# Patient Record
Sex: FEMALE | Race: WHITE | NOT HISPANIC OR LATINO | Employment: UNEMPLOYED | URBAN - METROPOLITAN AREA
[De-identification: names, ages, dates, MRNs, and addresses within clinical notes are randomized per-mention and may not be internally consistent; named-entity substitution may affect disease eponyms.]

---

## 2017-02-17 ENCOUNTER — OFFICE VISIT (OUTPATIENT)
Dept: PSYCHIATRY | Facility: CLINIC | Age: 32
End: 2017-02-17
Payer: MEDICAID

## 2017-02-17 VITALS
SYSTOLIC BLOOD PRESSURE: 131 MMHG | WEIGHT: 138 LBS | HEART RATE: 94 BPM | HEIGHT: 66 IN | DIASTOLIC BLOOD PRESSURE: 71 MMHG | BODY MASS INDEX: 22.18 KG/M2

## 2017-02-17 DIAGNOSIS — F90.2 ADHD (ATTENTION DEFICIT HYPERACTIVITY DISORDER), COMBINED TYPE: ICD-10-CM

## 2017-02-17 DIAGNOSIS — F41.9 ANXIETY: ICD-10-CM

## 2017-02-17 PROCEDURE — 99213 OFFICE O/P EST LOW 20 MIN: CPT | Mod: AF,S$PBB,,

## 2017-02-17 PROCEDURE — 90833 PSYTX W PT W E/M 30 MIN: CPT | Mod: PBBFAC

## 2017-02-17 PROCEDURE — 99213 OFFICE O/P EST LOW 20 MIN: CPT | Mod: PBBFAC

## 2017-02-17 PROCEDURE — 99999 PR PBB SHADOW E&M-EST. PATIENT-LVL III: CPT | Mod: PBBFAC,,,

## 2017-02-17 RX ORDER — CLONAZEPAM 1 MG/1
1 TABLET ORAL 2 TIMES DAILY PRN
Qty: 60 TABLET | Refills: 2 | Status: SHIPPED | OUTPATIENT
Start: 2017-02-17 | End: 2017-02-17 | Stop reason: SDUPTHER

## 2017-02-17 RX ORDER — VENLAFAXINE HYDROCHLORIDE 75 MG/1
75 CAPSULE, EXTENDED RELEASE ORAL DAILY
Qty: 30 CAPSULE | Refills: 11 | Status: SHIPPED | OUTPATIENT
Start: 2017-02-17 | End: 2017-02-17 | Stop reason: SDUPTHER

## 2017-02-17 RX ORDER — DEXTROAMPHETAMINE SACCHARATE, AMPHETAMINE ASPARTATE, DEXTROAMPHETAMINE SULFATE AND AMPHETAMINE SULFATE 5; 5; 5; 5 MG/1; MG/1; MG/1; MG/1
1 TABLET ORAL DAILY
Qty: 30 TABLET | Refills: 0 | Status: SHIPPED | OUTPATIENT
Start: 2017-02-17 | End: 2017-03-15 | Stop reason: SDUPTHER

## 2017-02-17 RX ORDER — DEXTROAMPHETAMINE SACCHARATE, AMPHETAMINE ASPARTATE, DEXTROAMPHETAMINE SULFATE AND AMPHETAMINE SULFATE 7.5; 7.5; 7.5; 7.5 MG/1; MG/1; MG/1; MG/1
30 TABLET ORAL 2 TIMES DAILY
Qty: 60 TABLET | Refills: 0 | Status: SHIPPED | OUTPATIENT
Start: 2017-02-17 | End: 2017-03-15 | Stop reason: SDUPTHER

## 2017-02-17 RX ORDER — CLONAZEPAM 1 MG/1
1 TABLET ORAL 2 TIMES DAILY PRN
Qty: 60 TABLET | Refills: 2 | Status: SHIPPED | OUTPATIENT
Start: 2017-02-17 | End: 2017-03-15 | Stop reason: SDUPTHER

## 2017-02-17 RX ORDER — VENLAFAXINE HYDROCHLORIDE 75 MG/1
75 CAPSULE, EXTENDED RELEASE ORAL DAILY
Qty: 30 CAPSULE | Refills: 11 | Status: SHIPPED | OUTPATIENT
Start: 2017-02-17 | End: 2017-04-25

## 2017-02-23 NOTE — PROGRESS NOTES
"Outpatient Psychiatry Follow-Up Visit (MD/NP)    2/17/2017    Clinical Status of Patient:  Outpatient (Ambulatory)    Chief Complaint:  Terri Summers is a 31 y.o. female who presents today for follow-up of anxiety and attention problems.  Met with patient.      Interval History and Content of Current Session:  Interim Events/Subjective Report/Content of Current Session: Patient was hospitalized at [facility] from 12/24-12/19 after becoming intoxicated and allegedly verbalizing suicidal ideation to her mother, who called the police. This was her first psychiatric hospitalization. Patient states that she had a blackout that night so she does not remember what happened but her mother told her she said something akin to "I'm done" and mother interpreted that as a suicidal ideation; patient denies that she had suicidal ideation or a plan to hurt herself. Patient's medications were not changed at the hospital other than the addition of depakote; patient did not know why she was prescribed depakote, I discussed possible reasoning including her psychiatrist at [place] making a diagnosis of bipolar disorder (patient denies this) or seizure prophylaxis due to alcohol withdrawal (patient denies any history of alcohol dependence). I was not notified of hospitalization and have not received any records from [facility]. Patient had been off of effexor for four days at the time of her hospitalization, has been compliant daily since discharge and reports her mood has been much better, denies that she has any current suicidal ideation.     Patient and boyfriend broke up prior to hospitalization and patient is currently living with her mother, which is a significant stressor. Hopes to move out with her daughters before the end of the year.     Review of Systems   · PSYCHIATRIC: Pertinant items are noted in the narrative.  · CONSTITUTIONAL: No weight gain or loss.     Past Medical, Family and Social History: The " "patient's past medical, family and social history have been reviewed and updated as appropriate within the electronic medical record - see encounter notes.    Compliance: yes    Side effects: None    Risk Parameters:  Patient reports no suicidal ideation  Patient reports no homicidal ideation  Patient reports no self-injurious behavior  Patient reports no violent behavior    Exam (detailed: at least 9 elements; comprehensive: all 15 elements)   Constitutional  Vitals:  Most recent vital signs, dated less than 90 days prior to this appointment, were reviewed.   Vitals:    02/17/17 1630   BP: 131/71   Pulse: 94   Weight: 62.6 kg (138 lb)   Height: 5' 6" (1.676 m)        General:  unremarkable, age appropriate, normal weight, casually dressed, neatly groomed     Musculoskeletal  Muscle Strength/Tone:  not examined   Gait & Station:  non-ataxic     Psychiatric  Speech:  no latency; no press   Mood & Affect:  steady  congruent and appropriate   Thought Process:  normal and logical   Associations:  intact   Thought Content:  normal, no suicidality, no homicidality, delusions, or paranoia   Insight:  intact   Judgement: behavior is adequate to circumstances   Orientation:  grossly intact   Memory: intact for content of interview   Language: grossly intact   Attention Span & Concentration:  able to focus   Fund of Knowledge:  intact and appropriate to age and level of education, not tested     Assessment and Diagnosis   Status/Progress: Based on the examination today, the patient's problem(s) is/are worsening.  New problems have been presented today.   Co-morbidities, Diagnostic uncertainty and Lack of compliance are not complicating management of the primary condition.  There are no active rule-out diagnoses for this patient at this time.     General Impression: 31-year-old woman initially presented for anxiety, depression, and ADHD, depression and anxiety were improving on effexor until patient stopped her medications for " four days in 12/16 and was hospitalized, patient has been compliant with all her medications since then and reports her symptoms have improved, ADHD symptoms also well-controlled on medication.       ICD-10-CM ICD-9-CM   1. Anxiety F41.9 300.00   2. ADHD (attention deficit hyperactivity disorder), combined type F90.2 314.01       Intervention/Counseling/Treatment Plan   · Continue effexor XR 75mg daily for anxiety. Patient aware of risks, benefits, and alternatives  · Continue klonopin 1mg twice daily as needed for anxiety. Patient aware of risks, benefits, and alternatives including risk of abuse and dependence  · Continue adderall 30mg twice daily and 20mg daily. Patient aware of risks, benefits, and alternatives including risk of abuse and dependence  · Strongly encouraged patient to re-start psychothearpy    Return to Clinic: 1 month  Time spent face-to-face with patient: 20 minutes, >50% spent in counseling

## 2017-03-01 ENCOUNTER — OFFICE VISIT (OUTPATIENT)
Dept: OBSTETRICS AND GYNECOLOGY | Facility: CLINIC | Age: 32
End: 2017-03-01
Payer: MEDICAID

## 2017-03-01 VITALS
DIASTOLIC BLOOD PRESSURE: 70 MMHG | HEIGHT: 66 IN | SYSTOLIC BLOOD PRESSURE: 110 MMHG | BODY MASS INDEX: 22.68 KG/M2 | WEIGHT: 141.13 LBS

## 2017-03-01 DIAGNOSIS — Z11.3 SCREENING FOR STD (SEXUALLY TRANSMITTED DISEASE): ICD-10-CM

## 2017-03-01 DIAGNOSIS — N94.10 DYSPAREUNIA, FEMALE: ICD-10-CM

## 2017-03-01 DIAGNOSIS — Z01.419 WELL WOMAN EXAM WITH ROUTINE GYNECOLOGICAL EXAM: Primary | ICD-10-CM

## 2017-03-01 PROCEDURE — 99212 OFFICE O/P EST SF 10 MIN: CPT | Mod: PBBFAC,PO | Performed by: OBSTETRICS & GYNECOLOGY

## 2017-03-01 PROCEDURE — 99395 PREV VISIT EST AGE 18-39: CPT | Mod: S$PBB,,, | Performed by: OBSTETRICS & GYNECOLOGY

## 2017-03-01 PROCEDURE — 87591 N.GONORRHOEAE DNA AMP PROB: CPT

## 2017-03-01 PROCEDURE — 99999 PR PBB SHADOW E&M-EST. PATIENT-LVL II: CPT | Mod: PBBFAC,,, | Performed by: OBSTETRICS & GYNECOLOGY

## 2017-03-01 RX ORDER — MEDROXYPROGESTERONE ACETATE 150 MG/ML
150 INJECTION, SUSPENSION INTRAMUSCULAR ONCE
Qty: 1 ML | Refills: 0 | Status: SHIPPED | OUTPATIENT
Start: 2017-03-01 | End: 2017-03-01

## 2017-03-01 NOTE — PROGRESS NOTES
GYNECOLOGY OFFICE NOTE    Reason for visit: annual    HPI: Pt is a 31 y.o.  (twins) female who presents for annual. Cycle: menarche- 15, Interval-  None with depo. She is sexually active.  She uses depo for contraception. She does desire STI screening. Last pap: 3/2016-negative pap/hpv, denies hx of abnormal.  Reports painful intercourse x 5 days. Denies vaginal discharge. Reports dryness at times. The use of hormonal contraception has been fully discussed with the patient. We discussed all options including OCPs, transdermal patches, vaginal ring, Depo Provera injections, Implanon, and IUD. Warnings about anticipated minor side effects such as breakthrough spotting, nausea, breast tenderness, weight changes, acne, headaches, etc were given.  She has been told of the more serious potential side effects such as MI, stroke, and deep vein thrombosis, all of which are very unlikely.  She has been asked to report any signs of such serious problems immediately. The need for additional protection, such as a condom, to prevent exposure to sexually transmitted diseases has also been discussed- the patient has been clearly reminded that no hormonal contraceptive method can protect her against diseases such as HIV and others. She understands and wishes to switch to Mirena.      Past Medical History:   Diagnosis Date    ADHD (attention deficit hyperactivity disorder)     Anxiety     History of psychiatric care     Psychiatric exam requested by authority     Psychiatric problem     Therapy        Past Surgical History:   Procedure Laterality Date     SECTION      DILATION AND CURETTAGE OF UTERUS      missed ab       Family History   Problem Relation Age of Onset    ADD / ADHD Father        Social History   Substance Use Topics    Smoking status: Current Every Day Smoker    Smokeless tobacco: None    Alcohol use Yes      Comment: rare drink occasionally       OB History    Para Term  " AB SAB TAB Ectopic Multiple Living   1 1 1      1 2      # Outcome Date GA Lbr [place]/2nd Weight Sex Delivery Anes PTL Lv   1A Term 13    F CS-LTranv   Y   1B Term 13    F CS-LTranv   Y          Current Outpatient Prescriptions   Medication Sig    clonazePAM (KLONOPIN) 1 MG tablet Take 1 tablet (1 mg total) by mouth 2 (two) times daily as needed for Anxiety.    dextroamphetamine-amphetamine (ADDERALL) 20 mg tablet Take 1 tablet by mouth once daily.    dextroamphetamine-amphetamine (ADDERALL) 20 mg tablet Take 1 tablet by mouth once daily.    dextroamphetamine-amphetamine 30 mg Tab Take 30 mg by mouth 2 (two) times daily.    dextroamphetamine-amphetamine 30 mg Tab Take 30 mg by mouth 2 (two) times daily.    dextroamphetamine-amphetamine 30 mg Tab Take 30 mg by mouth 2 (two) times daily.    medroxyPROGESTERone (DEPO-PROVERA) 150 mg/mL injection inject every 3 MONTHS    venlafaxine (EFFEXOR-XR) 75 MG 24 hr capsule Take 1 capsule (75 mg total) by mouth once daily.     No current facility-administered medications for this visit.        Allergies: Review of patient's allergies indicates no known allergies.     /70  Ht 5' 6" (1.676 m)  Wt 64 kg (141 lb 1.5 oz)  BMI 22.77 kg/m2    ROS:  GENERAL: Denies fever or chills.   SKIN: Denies rash or lesions.   HEAD: Denies head injury or headache.   NODES: Denies enlarged lymph nodes.   CHEST: Denies chest pain or shortness of breath.   CARDIOVASCULAR: Denies palpitations or chest pain.   ABDOMEN: No abdominal pain, constipation, diarrhea, nausea, vomiting or rectal bleeding.   URINARY: No dysuria, hematuria, or burning on urination.  REPRODUCTIVE: See HPI.   BREASTS: Denies pain, lumps, or nipple discharge.   HEMATOLOGIC: No easy bruisability or excessive bleeding.   MUSCULOSKELETAL: Denies joint pain or swelling.   NEUROLOGIC: Denies syncope or weakness.   PSYCHIATRIC: Denies depression, anxiety or mood swings.    Physical Exam:  GENERAL: alert, " appears stated age and cooperative  CHEST: Normal respiratory effort  HEART: S1 and S2 normal, regular rate and rhythm  NECK: normal appearance, no thyromegaly masses or tenderness  SKIN: no acne, striae, hirsutism  BREAST EXAM: breasts appear normal, no suspicious masses, no skin or nipple changes or axillary nodes  ABDOMEN: abdomen is soft without significant tenderness, masses, organomegaly or guarding, no hernias noted  EXTERNAL GENITALIA:  normal general appearance  URETHRA: normal urethra, normal urethral meatus  VAGINA:  normal mucosa without prolapse or lesions  CERVIX:  Normal  UTERUS:  normal size, mobile, non-tender, normal shape and consistency  ADNEXA:  normal adnexa in size, nontender and no masses    Diagnosis:  1. Well woman exam with routine gynecological exam    2. Dyspareunia, female    3. Screening for STD (sexually transmitted disease)        Plan:   1. Annual- pap/hpv 2021  2. NSAIDS prn for pain outside of intercourse- Replens/rephresh for dryness likely secondary to depo. Will see if symptoms improve with mirena  3. F/U gc/ct    Orders Placed This Encounter    C. trachomatis/N. gonorrhoeae by AMP DNA Cervix       Patient was counseled today on the new ACS guidelines for cervical cytology screening as well as the current recommendations for breast cancer screening. She was counseled to follow up with her PCP for other routine health maintenance.    Return for mirena placement.      Carmen Yoder MD  OB/GYN  Pager: [phone]

## 2017-03-03 LAB
C TRACH DNA SPEC QL NAA+PROBE: NEGATIVE
N GONORRHOEA DNA SPEC QL NAA+PROBE: NEGATIVE

## 2017-03-15 ENCOUNTER — OFFICE VISIT (OUTPATIENT)
Dept: PSYCHIATRY | Facility: CLINIC | Age: 32
End: 2017-03-15
Payer: MEDICAID

## 2017-03-15 VITALS
DIASTOLIC BLOOD PRESSURE: 60 MMHG | SYSTOLIC BLOOD PRESSURE: 139 MMHG | HEIGHT: 66 IN | HEART RATE: 106 BPM | BODY MASS INDEX: 23.78 KG/M2 | WEIGHT: 148 LBS

## 2017-03-15 DIAGNOSIS — F90.2 ADHD (ATTENTION DEFICIT HYPERACTIVITY DISORDER), COMBINED TYPE: ICD-10-CM

## 2017-03-15 DIAGNOSIS — F41.9 ANXIETY: ICD-10-CM

## 2017-03-15 PROCEDURE — 99999 PR PBB SHADOW E&M-EST. PATIENT-LVL II: CPT | Mod: PBBFAC,,,

## 2017-03-15 PROCEDURE — 99212 OFFICE O/P EST SF 10 MIN: CPT | Mod: PBBFAC

## 2017-03-15 PROCEDURE — 99213 OFFICE O/P EST LOW 20 MIN: CPT | Mod: AF,S$PBB,,

## 2017-03-15 RX ORDER — DEXTROAMPHETAMINE SACCHARATE, AMPHETAMINE ASPARTATE, DEXTROAMPHETAMINE SULFATE AND AMPHETAMINE SULFATE 5; 5; 5; 5 MG/1; MG/1; MG/1; MG/1
1 TABLET ORAL DAILY
Qty: 30 TABLET | Refills: 0 | Status: SHIPPED | OUTPATIENT
Start: 2017-03-15 | End: 2017-06-06 | Stop reason: SDUPTHER

## 2017-03-15 RX ORDER — DEXTROAMPHETAMINE SACCHARATE, AMPHETAMINE ASPARTATE, DEXTROAMPHETAMINE SULFATE AND AMPHETAMINE SULFATE 7.5; 7.5; 7.5; 7.5 MG/1; MG/1; MG/1; MG/1
30 TABLET ORAL 2 TIMES DAILY
Qty: 60 TABLET | Refills: 0 | Status: SHIPPED | OUTPATIENT
Start: 2017-05-12 | End: 2017-04-25

## 2017-03-15 RX ORDER — DEXTROAMPHETAMINE SACCHARATE, AMPHETAMINE ASPARTATE, DEXTROAMPHETAMINE SULFATE AND AMPHETAMINE SULFATE 7.5; 7.5; 7.5; 7.5 MG/1; MG/1; MG/1; MG/1
30 TABLET ORAL 2 TIMES DAILY
Qty: 60 TABLET | Refills: 0 | Status: SHIPPED | OUTPATIENT
Start: 2017-03-15 | End: 2017-07-03 | Stop reason: SDUPTHER

## 2017-03-15 RX ORDER — DEXTROAMPHETAMINE SACCHARATE, AMPHETAMINE ASPARTATE, DEXTROAMPHETAMINE SULFATE AND AMPHETAMINE SULFATE 5; 5; 5; 5 MG/1; MG/1; MG/1; MG/1
1 TABLET ORAL DAILY
Qty: 30 TABLET | Refills: 0 | Status: SHIPPED | OUTPATIENT
Start: 2017-04-14 | End: 2017-06-06 | Stop reason: SDUPTHER

## 2017-03-15 RX ORDER — DEXTROAMPHETAMINE SACCHARATE, AMPHETAMINE ASPARTATE, DEXTROAMPHETAMINE SULFATE AND AMPHETAMINE SULFATE 5; 5; 5; 5 MG/1; MG/1; MG/1; MG/1
1 TABLET ORAL DAILY
Qty: 30 TABLET | Refills: 0 | Status: SHIPPED | OUTPATIENT
Start: 2017-05-13 | End: 2017-04-25

## 2017-03-15 RX ORDER — DEXTROAMPHETAMINE SACCHARATE, AMPHETAMINE ASPARTATE, DEXTROAMPHETAMINE SULFATE AND AMPHETAMINE SULFATE 7.5; 7.5; 7.5; 7.5 MG/1; MG/1; MG/1; MG/1
30 TABLET ORAL 2 TIMES DAILY
Qty: 60 TABLET | Refills: 0 | Status: SHIPPED | OUTPATIENT
Start: 2017-04-14 | End: 2017-04-24 | Stop reason: SDUPTHER

## 2017-03-15 RX ORDER — CLONAZEPAM 1 MG/1
1 TABLET ORAL 2 TIMES DAILY PRN
Qty: 60 TABLET | Refills: 2 | Status: SHIPPED | OUTPATIENT
Start: 2017-03-15 | End: 2017-04-25

## 2017-03-15 NOTE — PROGRESS NOTES
"Outpatient Psychiatry Follow-Up Visit (MD/NP)    3/15/2017    Clinical Status of Patient:  Outpatient (Ambulatory)    Chief Complaint:  Terri Summers is a 31 y.o. female who presents today for follow-up of anxiety and attention problems.  Met with patient.      Interval History and Content of Current Session:  Interim Events/Subjective Report/Content of Current Session: Patient reports everything has been going well for her in the interim, adderall still effective for ADHD and other medications still effective for anxiety, patient has consumed alcohol in moderation on two occasions during [place] and did not use adderall or clonazepam on those days. She reports good mood, sleeping better, traveling to [place] for work, she denies any thoughts to hurt herself or others. Current stressors include living with her mother, she reports that she is finding ways to communicate better with her, I continued to encourage her to pursue psychotherapy.     Review of Systems   · PSYCHIATRIC: Pertinant items are noted in the narrative.  · CONSTITUTIONAL: No weight gain or loss.     Past Medical, Family and Social History: The patient's past medical, family and social history have been reviewed and updated as appropriate within the electronic medical record - see encounter notes.    Compliance: yes    Side effects: None    Risk Parameters:  Patient reports no suicidal ideation  Patient reports no homicidal ideation  Patient reports no self-injurious behavior  Patient reports no violent behavior    Exam (detailed: at least 9 elements; comprehensive: all 15 elements)   Constitutional  Vitals:  Most recent vital signs, dated less than 90 days prior to this appointment, were reviewed.   Vitals:    03/15/17 0942   BP: 139/60   Pulse: 106   Weight: 67.1 kg (148 lb)   Height: 5' 6" (1.676 m)        General:  unremarkable, age appropriate, normal weight, casually dressed, neatly groomed     Musculoskeletal  Muscle Strength/Tone:  not " examined   Gait & Station:  non-ataxic     Psychiatric  Speech:  no latency; no press   Mood & Affect:  steady  congruent and appropriate   Thought Process:  normal and logical   Associations:  intact   Thought Content:  normal, no suicidality, no homicidality, delusions, or paranoia   Insight:  intact   Judgement: behavior is adequate to circumstances   Orientation:  grossly intact   Memory: intact for content of interview   Language: grossly intact   Attention Span & Concentration:  able to focus   Fund of Knowledge:  intact and appropriate to age and level of education, not tested     Assessment and Diagnosis   Status/Progress: Based on the examination today, the patient's problem(s) is/are improved and well controlled.  New problems have not been presented today.   Co-morbidities, Diagnostic uncertainty and Lack of compliance are not complicating management of the primary condition.  There are no active rule-out diagnoses for this patient at this time.     General Impression: 31-year-old woman initially presented for anxiety, depression, and ADHD, depression and anxiety were improving on effexor until patient stopped her medications for four days in 12/16 and was hospitalized, patient has been compliant with all her medications since then and reports her symptoms have improved, ADHD symptoms also well-controlled on medication.       ICD-10-CM ICD-9-CM   1. ADHD (attention deficit hyperactivity disorder), combined type F90.2 314.01   2. Anxiety F41.9 300.00       Intervention/Counseling/Treatment Plan   · Continue effexor XR 75mg daily for anxiety. Patient aware of risks, benefits, and alternatives  · Continue klonopin 1mg twice daily as needed for anxiety. Patient aware of risks, benefits, and alternatives including risk of abuse and dependence  · Continue adderall 30mg twice daily and 20mg daily. Patient aware of risks, benefits, and alternatives including risk of abuse and dependence  · Strongly encouraged  patient to re-start psychothearpy    Return to Clinic: 3 months  Time spent face-to-face with patient: 10 minutes, >50% spent in counseling

## 2017-03-30 ENCOUNTER — TELEPHONE (OUTPATIENT)
Dept: OBSTETRICS AND GYNECOLOGY | Facility: CLINIC | Age: 32
End: 2017-03-30

## 2017-03-30 NOTE — TELEPHONE ENCOUNTER
Patient informed that [facility] has been trying to contact her.  Explained that they will not send the Mirena until they speak with her about the disclaimer.  Telephone number given to the patient to contact [facility].

## 2017-03-30 NOTE — TELEPHONE ENCOUNTER
----- Message from Dalila Pires sent at 3/30/2017 11:21 AM CDT -----  [facility] [phone]   [facility] called.  Several messages have been left for patient.  Pharmacy is not able to send the Mirena to the office until they speak to the patient.   Please try and contact patient.

## 2017-04-05 ENCOUNTER — TELEPHONE (OUTPATIENT)
Dept: OBSTETRICS AND GYNECOLOGY | Facility: CLINIC | Age: 32
End: 2017-04-05

## 2017-04-05 NOTE — TELEPHONE ENCOUNTER
Called patient to inform her of mirena being in , no answer . Left message for her to call back and schedule insertion

## 2017-04-06 ENCOUNTER — TELEPHONE (OUTPATIENT)
Dept: OBSTETRICS AND GYNECOLOGY | Facility: CLINIC | Age: 32
End: 2017-04-06

## 2017-04-06 NOTE — TELEPHONE ENCOUNTER
----- Message from Jennifer Arroyo sent at 4/5/2017  3:39 PM CDT -----  Contact: self/[phone]  Schedule appointment for mike placement.

## 2017-04-07 ENCOUNTER — TELEPHONE (OUTPATIENT)
Dept: OBSTETRICS AND GYNECOLOGY | Facility: CLINIC | Age: 32
End: 2017-04-07

## 2017-04-07 NOTE — TELEPHONE ENCOUNTER
----- Message from Viv Smalls sent at 4/6/2017 10:13 AM CDT -----  Contact: self, [phone]  Patient requests to schedule her mirena insertion appointment. Please advise.

## 2017-04-13 ENCOUNTER — TELEPHONE (OUTPATIENT)
Dept: PSYCHIATRY | Facility: CLINIC | Age: 32
End: 2017-04-13

## 2017-04-17 ENCOUNTER — TELEPHONE (OUTPATIENT)
Dept: PSYCHIATRY | Facility: CLINIC | Age: 32
End: 2017-04-17

## 2017-04-17 NOTE — TELEPHONE ENCOUNTER
Patient states she picked up her adderall and klonopin prescriptions on 4/14 and lost both. I let her know that I would call the pharmacy tomorrow to discuss options for refilling the prescriptions for this month but I would be unable to do so a second time should she lose her medications again in the future.

## 2017-04-18 ENCOUNTER — PATIENT MESSAGE (OUTPATIENT)
Dept: PSYCHIATRY | Facility: CLINIC | Age: 32
End: 2017-04-18

## 2017-04-18 DIAGNOSIS — F41.9 ANXIETY: ICD-10-CM

## 2017-04-18 DIAGNOSIS — F90.2 ADHD (ATTENTION DEFICIT HYPERACTIVITY DISORDER), COMBINED TYPE: Primary | ICD-10-CM

## 2017-04-18 RX ORDER — CLONAZEPAM 0.5 MG/1
1 TABLET ORAL 2 TIMES DAILY
Qty: 120 TABLET | Refills: 0 | Status: SHIPPED | OUTPATIENT
Start: 2017-04-18 | End: 2017-06-06 | Stop reason: SDUPTHER

## 2017-04-18 RX ORDER — DEXTROAMPHETAMINE SACCHARATE, AMPHETAMINE ASPARTATE, DEXTROAMPHETAMINE SULFATE AND AMPHETAMINE SULFATE 2.5; 2.5; 2.5; 2.5 MG/1; MG/1; MG/1; MG/1
TABLET ORAL
Qty: 60 TABLET | Refills: 0 | Status: SHIPPED | OUTPATIENT
Start: 2017-04-18 | End: 2017-04-25

## 2017-04-18 RX ORDER — DEXTROAMPHETAMINE SACCHARATE, AMPHETAMINE ASPARTATE, DEXTROAMPHETAMINE SULFATE AND AMPHETAMINE SULFATE 3.75; 3.75; 3.75; 3.75 MG/1; MG/1; MG/1; MG/1
TABLET ORAL
Qty: 60 TABLET | Refills: 0 | Status: SHIPPED | OUTPATIENT
Start: 2017-04-18 | End: 2017-04-24 | Stop reason: CLARIF

## 2017-04-24 DIAGNOSIS — F90.2 ADHD (ATTENTION DEFICIT HYPERACTIVITY DISORDER), COMBINED TYPE: ICD-10-CM

## 2017-04-24 RX ORDER — DEXTROAMPHETAMINE SACCHARATE, AMPHETAMINE ASPARTATE, DEXTROAMPHETAMINE SULFATE AND AMPHETAMINE SULFATE 3.75; 3.75; 3.75; 3.75 MG/1; MG/1; MG/1; MG/1
TABLET ORAL
Qty: 60 TABLET | Refills: 0 | Status: SHIPPED | OUTPATIENT
Start: 2017-05-02 | End: 2017-04-25

## 2017-04-25 ENCOUNTER — OFFICE VISIT (OUTPATIENT)
Dept: OBSTETRICS AND GYNECOLOGY | Facility: CLINIC | Age: 32
End: 2017-04-25
Payer: MEDICAID

## 2017-04-25 VITALS
BODY MASS INDEX: 23.59 KG/M2 | WEIGHT: 146.81 LBS | HEIGHT: 66 IN | DIASTOLIC BLOOD PRESSURE: 74 MMHG | SYSTOLIC BLOOD PRESSURE: 120 MMHG

## 2017-04-25 DIAGNOSIS — Z30.430 ENCOUNTER FOR INSERTION OF MIRENA IUD: Primary | ICD-10-CM

## 2017-04-25 DIAGNOSIS — R10.2 PELVIC PAIN IN FEMALE: ICD-10-CM

## 2017-04-25 LAB
B-HCG UR QL: NEGATIVE
CTP QC/QA: YES

## 2017-04-25 PROCEDURE — 58300 INSERT INTRAUTERINE DEVICE: CPT | Mod: PBBFAC,PO | Performed by: OBSTETRICS & GYNECOLOGY

## 2017-04-25 PROCEDURE — 81025 URINE PREGNANCY TEST: CPT | Mod: PBBFAC,PO | Performed by: OBSTETRICS & GYNECOLOGY

## 2017-04-25 PROCEDURE — 99999 PR PBB SHADOW E&M-EST. PATIENT-LVL III: CPT | Mod: PBBFAC,,, | Performed by: OBSTETRICS & GYNECOLOGY

## 2017-04-25 PROCEDURE — 99213 OFFICE O/P EST LOW 20 MIN: CPT | Mod: PBBFAC,PO | Performed by: OBSTETRICS & GYNECOLOGY

## 2017-04-25 PROCEDURE — 99499 UNLISTED E&M SERVICE: CPT | Mod: S$PBB,,, | Performed by: OBSTETRICS & GYNECOLOGY

## 2017-04-25 PROCEDURE — 58300 INSERT INTRAUTERINE DEVICE: CPT | Mod: S$PBB,,, | Performed by: OBSTETRICS & GYNECOLOGY

## 2017-04-25 RX ORDER — TRAMADOL HYDROCHLORIDE 50 MG/1
50 TABLET ORAL EVERY 6 HOURS PRN
Qty: 15 TABLET | Refills: 0 | Status: SHIPPED | OUTPATIENT
Start: 2017-04-25 | End: 2017-05-05

## 2017-04-25 RX ADMIN — LEVONORGESTREL 1 EACH: 52 INTRAUTERINE DEVICE INTRAUTERINE at 09:04

## 2017-04-25 NOTE — PROGRESS NOTES
DATE: 4/25/17    TIME: 0920    PROCEDURE:  Mirena insertion    INDICATION: Contraception    PATIENT CONSENT: She was counseled on the risks, benefits, indications, and alternatives to IUD use.  She understands that with insertion there is a risk of bleeding, infection, and uterine perforation.  All questions are answered.  Consents signed.     LABS: UPT is negative. Cervical cultures were previously performed.    ANESTHESIA: NONE    PROCEDURE NOTE:    Time out performed.  The cervix was visualized with a speculum. The cervix was cleansed with betadine swab x 3.  A single tooth tenaculum was placed on the anterior lip of the cervix. The uterus sounds to 8cm using sterile technique. A Mirena was loaded and placed high in the uterine fundus without difficulty using sterile technique.The string was was then cut.The tenaculum and speculum were removed.    COMPLICATIONS: None    PATIENT DISPOSITION: The patient tolerated the procedure well.    Assessment:  1.  Contraceptive management/IUD insertion  2. Pelvic pain- U/S order placed    Post IUD placement counseling:  Manage post IUD placement pain with NSAIDS, Tylenol or Rx per [facility]. IUD danger signs and how to check for strings were discussed. The IUD needs to be removed in 5 years for Mirena.     Follow up:  4 weeks for string check    Carmen Yoder MD  OB/GYN  Pager: [phone]

## 2017-04-26 ENCOUNTER — OFFICE VISIT (OUTPATIENT)
Dept: OBSTETRICS AND GYNECOLOGY | Facility: CLINIC | Age: 32
End: 2017-04-26
Payer: MEDICAID

## 2017-04-26 DIAGNOSIS — R10.2 PELVIC PAIN IN FEMALE: ICD-10-CM

## 2017-04-26 PROCEDURE — 76830 TRANSVAGINAL US NON-OB: CPT | Mod: PBBFAC,PO

## 2017-04-26 PROCEDURE — 76830 TRANSVAGINAL US NON-OB: CPT | Mod: 26,S$PBB,, | Performed by: OBSTETRICS & GYNECOLOGY

## 2017-06-06 ENCOUNTER — OFFICE VISIT (OUTPATIENT)
Dept: PSYCHIATRY | Facility: CLINIC | Age: 32
End: 2017-06-06
Payer: MEDICAID

## 2017-06-06 VITALS
BODY MASS INDEX: 27.64 KG/M2 | SYSTOLIC BLOOD PRESSURE: 117 MMHG | WEIGHT: 172 LBS | DIASTOLIC BLOOD PRESSURE: 74 MMHG | HEIGHT: 66 IN | HEART RATE: 91 BPM

## 2017-06-06 DIAGNOSIS — F90.2 ADHD (ATTENTION DEFICIT HYPERACTIVITY DISORDER), COMBINED TYPE: ICD-10-CM

## 2017-06-06 DIAGNOSIS — F41.9 ANXIETY: ICD-10-CM

## 2017-06-06 PROCEDURE — 99213 OFFICE O/P EST LOW 20 MIN: CPT | Mod: HB,AF,S$PBB,

## 2017-06-06 PROCEDURE — 99213 OFFICE O/P EST LOW 20 MIN: CPT | Mod: PBBFAC

## 2017-06-06 PROCEDURE — 99999 PR PBB SHADOW E&M-EST. PATIENT-LVL III: CPT | Mod: PBBFAC,,,

## 2017-06-06 PROCEDURE — 90833 PSYTX W PT W E/M 30 MIN: CPT | Mod: PBBFAC

## 2017-06-06 PROCEDURE — 90833 PSYTX W PT W E/M 30 MIN: CPT | Mod: HB,AF,S$PBB,

## 2017-06-06 RX ORDER — DEXTROAMPHETAMINE SACCHARATE, AMPHETAMINE ASPARTATE, DEXTROAMPHETAMINE SULFATE AND AMPHETAMINE SULFATE 7.5; 7.5; 7.5; 7.5 MG/1; MG/1; MG/1; MG/1
30 TABLET ORAL 2 TIMES DAILY
Qty: 60 TABLET | Refills: 0 | Status: SHIPPED | OUTPATIENT
Start: 2017-06-06 | End: 2017-07-03 | Stop reason: SDUPTHER

## 2017-06-06 RX ORDER — CLONAZEPAM 1 MG/1
1 TABLET ORAL 2 TIMES DAILY
Qty: 60 TABLET | Refills: 2 | Status: SHIPPED | OUTPATIENT
Start: 2017-06-06 | End: 2017-08-23 | Stop reason: SDUPTHER

## 2017-06-06 RX ORDER — DEXTROAMPHETAMINE SACCHARATE, AMPHETAMINE ASPARTATE, DEXTROAMPHETAMINE SULFATE AND AMPHETAMINE SULFATE 5; 5; 5; 5 MG/1; MG/1; MG/1; MG/1
1 TABLET ORAL DAILY
Qty: 30 TABLET | Refills: 0 | Status: SHIPPED | OUTPATIENT
Start: 2017-06-06 | End: 2017-07-03 | Stop reason: SDUPTHER

## 2017-06-06 RX ORDER — VENLAFAXINE HYDROCHLORIDE 150 MG/1
150 CAPSULE, EXTENDED RELEASE ORAL DAILY
Qty: 30 CAPSULE | Refills: 2 | Status: SHIPPED | OUTPATIENT
Start: 2017-06-06 | End: 2017-07-03 | Stop reason: SDUPTHER

## 2017-06-06 NOTE — PROGRESS NOTES
Outpatient Psychiatry Follow-Up Visit (MD/NP)    6/6/2017    Clinical Status of Patient:  Outpatient (Ambulatory)    Chief Complaint:  Terri Summers is a 31 y.o. female who presents today for follow-up of anxiety and attention problems.  Met with patient.      Interval History and Content of Current Session:  Interim Events/Subjective Report/Content of Current Session: Patient reports things are mostly going well, plans to move into her own apartment with her daughters on August 1st, they will be starting pre-k 4 and she is excited about the money she will save on childcare. She reports good mood, feels adderall still working well for her for ADHD. Reports insomnia, at least in part due to anxiety, states she has been on ambien in the past with good effect, we discussed risks of taking both klonopin and ambien and I let her know I would not prescribe ambien for her at this time, as well as broader risks of being on benzodiazepines long-term. We discussed increasing dose of effexor with goal of managing anxiety better so patient could taper off klonopin.     Psychotherapy:  · Target symptoms: distractability, lack of focus, anxiety , adjustment, work stress  · Why chosen therapy is appropriate versus another modality: relevant to diagnosis, patient responds to this modality, evidence based practice  · Outcome monitoring methods: self-report, observation  · Therapeutic intervention type: insight oriented psychotherapy, behavior modifying psychotherapy, supportive psychotherapy  · Topics discussed/themes: relationships difficulties, work stress, parenting issues, difficulty managing affect in interpersonal relationships, building skills sets for symptom management, symptom recognition, financial stressors, life stage transitional issues  · The patient's response to the intervention is accepting. The patient's progress toward treatment goals is not progressing.   · Duration of intervention: 16 minutes.    Review of  "Systems   · PSYCHIATRIC: Pertinant items are noted in the narrative.  · CONSTITUTIONAL: Positive for weight gain.     Past Medical, Family and Social History: The patient's past medical, family and social history have been reviewed and updated as appropriate within the electronic medical record - see encounter notes.    Compliance: yes    Side effects: None    Risk Parameters:  Patient reports no suicidal ideation  Patient reports no homicidal ideation  Patient reports no self-injurious behavior  Patient reports no violent behavior    Exam (detailed: at least 9 elements; comprehensive: all 15 elements)   Constitutional  Vitals:  Most recent vital signs, dated less than 90 days prior to this appointment, were reviewed.   Vitals:    06/06/17 0853   BP: 117/74   Pulse: 91   Weight: 78 kg (172 lb)   Height: 5' 6" (1.676 m)        General:  unremarkable, age appropriate, normal weight, casually dressed, neatly groomed     Musculoskeletal  Muscle Strength/Tone:  not examined   Gait & Station:  non-ataxic     Psychiatric  Speech:  no latency; no press   Mood & Affect:  steady  congruent and appropriate   Thought Process:  normal and logical   Associations:  intact   Thought Content:  normal, no suicidality, no homicidality, delusions, or paranoia   Insight:  intact   Judgement: behavior is adequate to circumstances   Orientation:  grossly intact   Memory: intact for content of interview   Language: grossly intact   Attention Span & Concentration:  able to focus   Fund of Knowledge:  intact and appropriate to age and level of education, not tested     Assessment and Diagnosis   Status/Progress: Based on the examination today, the patient's problem(s) is/are adequately but not ideally controlled and worsening.  New problems have not been presented today.   Co-morbidities, Diagnostic uncertainty and Lack of compliance are not complicating management of the primary condition.  There are no active rule-out diagnoses for this " patient at this time.     General Impression: 31-year-old woman initially presented for anxiety, depression, and ADHD, depression and anxiety were improving on effexor until patient stopped her medications for four days in 12/16 and was hospitalized, patient has been compliant with all her medications since then and reports her symptoms have improved, ADHD symptoms also well-controlled on medication. Anxiety symptoms not ideally controlled, we discussed increasing effexor to better manage this.       ICD-10-CM ICD-9-CM   1. Anxiety F41.9 300.00   2. ADHD (attention deficit hyperactivity disorder), combined type F90.2 314.01       Intervention/Counseling/Treatment Plan   · Increase effexor XR 150mg daily for anxiety. Patient aware of risks, benefits, and alternatives  · Continue klonopin 1mg twice daily as needed for anxiety. Patient aware of risks, benefits, and alternatives including risk of abuse and dependence  · Continue adderall 30mg twice daily and 20mg daily. Patient aware of risks, benefits, and alternatives including risk of abuse and dependence    Return to Clinic: 1 month  Time spent face-to-face with patient: 30 minutes, >50% spent in counseling

## 2017-07-03 ENCOUNTER — OFFICE VISIT (OUTPATIENT)
Dept: PSYCHIATRY | Facility: CLINIC | Age: 32
End: 2017-07-03
Payer: MEDICAID

## 2017-07-03 VITALS
BODY MASS INDEX: 27.8 KG/M2 | HEIGHT: 66 IN | HEART RATE: 77 BPM | WEIGHT: 173 LBS | SYSTOLIC BLOOD PRESSURE: 113 MMHG | DIASTOLIC BLOOD PRESSURE: 67 MMHG

## 2017-07-03 DIAGNOSIS — F90.2 ADHD (ATTENTION DEFICIT HYPERACTIVITY DISORDER), COMBINED TYPE: ICD-10-CM

## 2017-07-03 DIAGNOSIS — F41.9 ANXIETY: ICD-10-CM

## 2017-07-03 PROCEDURE — 99213 OFFICE O/P EST LOW 20 MIN: CPT | Mod: AF,HB,S$PBB,

## 2017-07-03 PROCEDURE — 99999 PR PBB SHADOW E&M-EST. PATIENT-LVL III: CPT | Mod: PBBFAC,,,

## 2017-07-03 PROCEDURE — 99213 OFFICE O/P EST LOW 20 MIN: CPT | Mod: PBBFAC

## 2017-07-03 RX ORDER — DEXTROAMPHETAMINE SACCHARATE, AMPHETAMINE ASPARTATE, DEXTROAMPHETAMINE SULFATE AND AMPHETAMINE SULFATE 5; 5; 5; 5 MG/1; MG/1; MG/1; MG/1
1 TABLET ORAL DAILY
Qty: 30 TABLET | Refills: 0 | Status: SHIPPED | OUTPATIENT
Start: 2017-07-03 | End: 2017-08-23 | Stop reason: SDUPTHER

## 2017-07-03 RX ORDER — DEXTROAMPHETAMINE SACCHARATE, AMPHETAMINE ASPARTATE, DEXTROAMPHETAMINE SULFATE AND AMPHETAMINE SULFATE 7.5; 7.5; 7.5; 7.5 MG/1; MG/1; MG/1; MG/1
30 TABLET ORAL 2 TIMES DAILY
Qty: 60 TABLET | Refills: 0 | Status: SHIPPED | OUTPATIENT
Start: 2017-07-03 | End: 2017-08-23 | Stop reason: SDUPTHER

## 2017-07-03 RX ORDER — DEXTROAMPHETAMINE SACCHARATE, AMPHETAMINE ASPARTATE, DEXTROAMPHETAMINE SULFATE AND AMPHETAMINE SULFATE 7.5; 7.5; 7.5; 7.5 MG/1; MG/1; MG/1; MG/1
30 TABLET ORAL 2 TIMES DAILY
Qty: 60 TABLET | Refills: 0 | Status: SHIPPED | OUTPATIENT
Start: 2017-08-02 | End: 2017-08-23 | Stop reason: SDUPTHER

## 2017-07-03 RX ORDER — DEXTROAMPHETAMINE SACCHARATE, AMPHETAMINE ASPARTATE, DEXTROAMPHETAMINE SULFATE AND AMPHETAMINE SULFATE 5; 5; 5; 5 MG/1; MG/1; MG/1; MG/1
1 TABLET ORAL DAILY
Qty: 30 TABLET | Refills: 0 | Status: SHIPPED | OUTPATIENT
Start: 2017-08-02 | End: 2017-08-23 | Stop reason: SDUPTHER

## 2017-07-03 RX ORDER — VENLAFAXINE HYDROCHLORIDE 75 MG/1
225 CAPSULE, EXTENDED RELEASE ORAL DAILY
Qty: 90 CAPSULE | Refills: 2 | Status: SHIPPED | OUTPATIENT
Start: 2017-07-03 | End: 2017-09-26 | Stop reason: SDUPTHER

## 2017-07-03 NOTE — PROGRESS NOTES
"Outpatient Psychiatry Follow-Up Visit (MD/NP)    7/3/2017    Clinical Status of Patient:  Outpatient (Ambulatory)    Chief Complaint:  Terri Summers is a 31 y.o. female who presents today for follow-up of anxiety and attention problems.  Met with patient.      Interval History and Content of Current Session:  Interim Events/Subjective Report/Content of Current Session: Patient reports she has not noticed a difference in anxiety on increased dose of effexor, reports everything is going well, mood has been good, preparing to move out of her mother's house and into her own place with her two daughters, predicts that her anxiety will decrease with the move as frequent conflict with her mother has been a significant stressor.     Review of Systems   · PSYCHIATRIC: Pertinant items are noted in the narrative.  · CONSTITUTIONAL: Positive for weight gain.     Past Medical, Family and Social History: The patient's past medical, family and social history have been reviewed and updated as appropriate within the electronic medical record - see encounter notes.    Compliance: yes    Side effects: None    Risk Parameters:  Patient reports no suicidal ideation  Patient reports no homicidal ideation  Patient reports no self-injurious behavior  Patient reports no violent behavior    Exam (detailed: at least 9 elements; comprehensive: all 15 elements)   Constitutional  Vitals:  Most recent vital signs, dated less than 90 days prior to this appointment, were reviewed.   Vitals:    07/03/17 0925   BP: 113/67   Pulse: 77   Weight: 78.5 kg (173 lb)   Height: 5' 6" (1.676 m)        General:  unremarkable, age appropriate, normal weight, casually dressed, neatly groomed     Musculoskeletal  Muscle Strength/Tone:  not examined   Gait & Station:  non-ataxic     Psychiatric  Speech:  no latency; no press   Mood & Affect:  steady  congruent and appropriate   Thought Process:  normal and logical   Associations:  intact   Thought Content:  " normal, no suicidality, no homicidality, delusions, or paranoia   Insight:  intact   Judgement: behavior is adequate to circumstances   Orientation:  grossly intact   Memory: intact for content of interview   Language: grossly intact   Attention Span & Concentration:  able to focus   Fund of Knowledge:  intact and appropriate to age and level of education, not tested     Assessment and Diagnosis   Status/Progress: Based on the examination today, the patient's problem(s) is/are adequately but not ideally controlled.  New problems have not been presented today.   Co-morbidities, Diagnostic uncertainty and Lack of compliance are not complicating management of the primary condition.  There are no active rule-out diagnoses for this patient at this time.     General Impression: 31-year-old woman initially presented for anxiety, depression, and ADHD, depression and anxiety were improving on effexor until patient stopped her medications for four days in 12/16 and was hospitalized, patient has been compliant with all her medications since then and reports her symptoms have improved, ADHD symptoms also well-controlled on medication. Anxiety symptoms not ideally controlled, we discussed increasing effexor to better manage this.      ICD-10-CM ICD-9-CM   1. ADHD (attention deficit hyperactivity disorder), combined type F90.2 314.01   2. Anxiety F41.9 300.00       Intervention/Counseling/Treatment Plan   · Increase effexor XR 225mg daily for anxiety. Patient aware of risks, benefits, and alternatives  · Continue klonopin 1mg twice daily as needed for anxiety. Patient aware of risks, benefits, and alternatives including risk of abuse and dependence  · Continue adderall 30mg twice daily and 20mg daily. Patient aware of risks, benefits, and alternatives including risk of abuse and dependence    Return to Clinic: 2 months  Time spent face-to-face with patient: 9 minutes, >50% spent in counseling

## 2017-08-23 ENCOUNTER — OFFICE VISIT (OUTPATIENT)
Dept: PSYCHIATRY | Facility: CLINIC | Age: 32
End: 2017-08-23
Payer: MEDICAID

## 2017-08-23 VITALS
WEIGHT: 173 LBS | HEIGHT: 66 IN | HEART RATE: 69 BPM | SYSTOLIC BLOOD PRESSURE: 129 MMHG | BODY MASS INDEX: 27.8 KG/M2 | DIASTOLIC BLOOD PRESSURE: 69 MMHG

## 2017-08-23 DIAGNOSIS — F41.9 ANXIETY: ICD-10-CM

## 2017-08-23 DIAGNOSIS — F90.2 ADHD (ATTENTION DEFICIT HYPERACTIVITY DISORDER), COMBINED TYPE: ICD-10-CM

## 2017-08-23 PROCEDURE — 99213 OFFICE O/P EST LOW 20 MIN: CPT | Mod: PBBFAC

## 2017-08-23 PROCEDURE — 99999 PR PBB SHADOW E&M-EST. PATIENT-LVL III: CPT | Mod: PBBFAC,,,

## 2017-08-23 PROCEDURE — 99213 OFFICE O/P EST LOW 20 MIN: CPT | Mod: SA,HB,S$PBB,

## 2017-08-23 RX ORDER — DEXTROAMPHETAMINE SACCHARATE, AMPHETAMINE ASPARTATE, DEXTROAMPHETAMINE SULFATE AND AMPHETAMINE SULFATE 5; 5; 5; 5 MG/1; MG/1; MG/1; MG/1
1 TABLET ORAL DAILY
Qty: 30 TABLET | Refills: 0 | Status: SHIPPED | OUTPATIENT
Start: 2017-09-01 | End: 2017-09-26 | Stop reason: SDUPTHER

## 2017-08-23 RX ORDER — DEXTROAMPHETAMINE SACCHARATE, AMPHETAMINE ASPARTATE, DEXTROAMPHETAMINE SULFATE AND AMPHETAMINE SULFATE 7.5; 7.5; 7.5; 7.5 MG/1; MG/1; MG/1; MG/1
30 TABLET ORAL 2 TIMES DAILY
Qty: 60 TABLET | Refills: 0 | Status: SHIPPED | OUTPATIENT
Start: 2017-09-01 | End: 2017-09-26 | Stop reason: SDUPTHER

## 2017-08-23 RX ORDER — CLONAZEPAM 1 MG/1
1 TABLET ORAL DAILY
Qty: 30 TABLET | Refills: 0 | Status: SHIPPED | OUTPATIENT
Start: 2017-08-23 | End: 2017-09-26 | Stop reason: SDUPTHER

## 2017-08-23 RX ORDER — CLONAZEPAM 0.5 MG/1
TABLET ORAL
Qty: 45 TABLET | Refills: 0 | Status: SHIPPED | OUTPATIENT
Start: 2017-08-23 | End: 2017-09-26 | Stop reason: SDUPTHER

## 2017-08-23 NOTE — PROGRESS NOTES
Outpatient Psychiatry Follow-Up Visit (MD/NP)    8/23/2017    Clinical Status of Patient:  Outpatient (Ambulatory)    Chief Complaint:  Terri Summers is a 31 y.o. female who presents today for follow-up of anxiety and attention problems.  Met with patient.      Interval History and Content of Current Session:  Interim Events/Subjective Report/Content of Current Session: Patient reports she feels higher dose of effexor (225mg) has been more effective for her anxiety, baseline level of anxiety is lower and she feels her mood is happier. Her twins also started pre-K this year, so this removes the financial burden of paying for  and has lifted some additional stress. Patient moved out of her mother's house into a new apartment, still unpacking but the dust has mostly settled from the move. Patient feels ready to start tapering klonopin, as her anxiety level has been reduced on higher dose of effexor and her living situation is less anxiety-provoking. We discussed decreasing dose to 1mg qAM & 0.75mg qPM. Patient has some trepidation about taper but discussed risks of long-term benzodizepine use and risks of taking klonopin and adderall together, explained cognitive dulling effects of benzodizepines and that patient may find her focus is improved once taper is complete, she verbalized understanding.     Review of Systems   · PSYCHIATRIC: Pertinant items are noted in the narrative.  · CONSTITUTIONAL: No weight gain or loss.     Past Medical, Family and Social History: The patient's past medical, family and social history have been reviewed and updated as appropriate within the electronic medical record - see encounter notes.    Compliance: yes    Side effects: None    Risk Parameters:  Patient reports no suicidal ideation  Patient reports no homicidal ideation  Patient reports no self-injurious behavior  Patient reports no violent behavior    Exam (detailed: at least 9 elements; comprehensive: all 15 elements)  "  Constitutional  Vitals:  Most recent vital signs, dated less than 90 days prior to this appointment, were reviewed.   Vitals:    08/23/17 0900   BP: 129/69   Pulse: 69   Weight: 78.5 kg (173 lb)   Height: 5' 6" (1.676 m)        General:  unremarkable, age appropriate, normal weight, casually dressed, neatly groomed     Musculoskeletal  Muscle Strength/Tone:  not examined   Gait & Station:  non-ataxic     Psychiatric  Speech:  no latency; no press   Mood & Affect:  steady  congruent and appropriate   Thought Process:  normal and logical   Associations:  intact   Thought Content:  normal, no suicidality, no homicidality, delusions, or paranoia   Insight:  intact   Judgement: behavior is adequate to circumstances   Orientation:  grossly intact   Memory: intact for content of interview   Language: grossly intact   Attention Span & Concentration:  able to focus   Fund of Knowledge:  intact and appropriate to age and level of education, not tested     Assessment and Diagnosis   Status/Progress: Based on the examination today, the patient's problem(s) is/are improved and well controlled.  New problems have not been presented today.   Co-morbidities, Diagnostic uncertainty and Lack of compliance are not complicating management of the primary condition.  There are no active rule-out diagnoses for this patient at this time.     General Impression: 31-year-old woman initially presented for anxiety, depression, and ADHD, depression and anxiety were improving on effexor until patient stopped her medications for four days in 12/16 and was hospitalized, patient has been compliant with all her medications since then and reports her symptoms have improved, ADHD symptoms also well-controlled on medication. Anxiety symptoms not ideally controlled, we discussed increasing effexor to better manage this. Patient responding well to higher dose of effexor, we discussed starting to taper off klonopin.     No diagnosis " found.    Intervention/Counseling/Treatment Plan   · Continue effexor XR 225mg daily for anxiety.   · Decrease klonopin 1mg qAM & 0.75mg qPM, discussed risks, benefits, and alternativse to this medication.   · Continue adderall 30mg twice daily and 20mg daily.    Return to Clinic: 1 month  Time spent face-to-face with patient: 15 minutes, >50% spent in counseling

## 2017-09-26 ENCOUNTER — PATIENT MESSAGE (OUTPATIENT)
Dept: PSYCHIATRY | Facility: CLINIC | Age: 32
End: 2017-09-26

## 2017-09-26 DIAGNOSIS — F90.2 ADHD (ATTENTION DEFICIT HYPERACTIVITY DISORDER), COMBINED TYPE: ICD-10-CM

## 2017-09-26 DIAGNOSIS — F41.9 ANXIETY: ICD-10-CM

## 2017-09-26 RX ORDER — VENLAFAXINE HYDROCHLORIDE 75 MG/1
225 CAPSULE, EXTENDED RELEASE ORAL DAILY
Qty: 90 CAPSULE | Refills: 11 | Status: SHIPPED | OUTPATIENT
Start: 2017-09-26 | End: 2017-10-27 | Stop reason: SDUPTHER

## 2017-09-26 RX ORDER — DEXTROAMPHETAMINE SACCHARATE, AMPHETAMINE ASPARTATE, DEXTROAMPHETAMINE SULFATE AND AMPHETAMINE SULFATE 5; 5; 5; 5 MG/1; MG/1; MG/1; MG/1
1 TABLET ORAL DAILY
Qty: 30 TABLET | Refills: 0 | Status: SHIPPED | OUTPATIENT
Start: 2017-09-26 | End: 2017-10-27 | Stop reason: SDUPTHER

## 2017-09-26 RX ORDER — CLONAZEPAM 1 MG/1
1 TABLET ORAL DAILY
Qty: 30 TABLET | Refills: 0 | Status: SHIPPED | OUTPATIENT
Start: 2017-09-26 | End: 2017-10-27 | Stop reason: SDUPTHER

## 2017-09-26 RX ORDER — DEXTROAMPHETAMINE SACCHARATE, AMPHETAMINE ASPARTATE, DEXTROAMPHETAMINE SULFATE AND AMPHETAMINE SULFATE 7.5; 7.5; 7.5; 7.5 MG/1; MG/1; MG/1; MG/1
30 TABLET ORAL 2 TIMES DAILY
Qty: 60 TABLET | Refills: 0 | Status: SHIPPED | OUTPATIENT
Start: 2017-09-26 | End: 2017-10-27 | Stop reason: SDUPTHER

## 2017-09-26 RX ORDER — CLONAZEPAM 0.5 MG/1
TABLET ORAL
Qty: 45 TABLET | Refills: 0 | Status: SHIPPED | OUTPATIENT
Start: 2017-09-26 | End: 2017-10-27 | Stop reason: SDUPTHER

## 2017-10-24 ENCOUNTER — PATIENT MESSAGE (OUTPATIENT)
Dept: PSYCHIATRY | Facility: CLINIC | Age: 32
End: 2017-10-24

## 2017-10-25 DIAGNOSIS — F90.2 ADHD (ATTENTION DEFICIT HYPERACTIVITY DISORDER), COMBINED TYPE: ICD-10-CM

## 2017-10-25 DIAGNOSIS — F41.9 ANXIETY: ICD-10-CM

## 2017-10-25 RX ORDER — CLONAZEPAM 1 MG/1
1 TABLET ORAL DAILY
Qty: 30 TABLET | Refills: 0 | Status: CANCELLED | OUTPATIENT
Start: 2017-10-25 | End: 2018-10-25

## 2017-10-25 RX ORDER — DEXTROAMPHETAMINE SACCHARATE, AMPHETAMINE ASPARTATE, DEXTROAMPHETAMINE SULFATE AND AMPHETAMINE SULFATE 5; 5; 5; 5 MG/1; MG/1; MG/1; MG/1
1 TABLET ORAL DAILY
Qty: 30 TABLET | Refills: 0 | Status: CANCELLED | OUTPATIENT
Start: 2017-10-25

## 2017-10-25 RX ORDER — DEXTROAMPHETAMINE SACCHARATE, AMPHETAMINE ASPARTATE, DEXTROAMPHETAMINE SULFATE AND AMPHETAMINE SULFATE 7.5; 7.5; 7.5; 7.5 MG/1; MG/1; MG/1; MG/1
30 TABLET ORAL 2 TIMES DAILY
Qty: 60 TABLET | Refills: 0 | Status: CANCELLED | OUTPATIENT
Start: 2017-10-25

## 2017-10-25 RX ORDER — CLONAZEPAM 0.5 MG/1
TABLET ORAL
Qty: 45 TABLET | Refills: 0 | Status: CANCELLED | OUTPATIENT
Start: 2017-10-25

## 2017-10-27 DIAGNOSIS — F90.2 ADHD (ATTENTION DEFICIT HYPERACTIVITY DISORDER), COMBINED TYPE: ICD-10-CM

## 2017-10-27 DIAGNOSIS — F41.9 ANXIETY: ICD-10-CM

## 2017-10-27 RX ORDER — VENLAFAXINE HYDROCHLORIDE 75 MG/1
225 CAPSULE, EXTENDED RELEASE ORAL DAILY
Qty: 90 CAPSULE | Refills: 0 | Status: SHIPPED | OUTPATIENT
Start: 2017-10-27 | End: 2017-11-27 | Stop reason: SDUPTHER

## 2017-10-27 RX ORDER — DEXTROAMPHETAMINE SACCHARATE, AMPHETAMINE ASPARTATE, DEXTROAMPHETAMINE SULFATE AND AMPHETAMINE SULFATE 7.5; 7.5; 7.5; 7.5 MG/1; MG/1; MG/1; MG/1
30 TABLET ORAL 2 TIMES DAILY
Qty: 60 TABLET | Refills: 0 | Status: SHIPPED | OUTPATIENT
Start: 2017-10-27 | End: 2017-11-08 | Stop reason: SDUPTHER

## 2017-10-27 RX ORDER — CLONAZEPAM 1 MG/1
1 TABLET ORAL DAILY
Qty: 30 TABLET | Refills: 0 | Status: SHIPPED | OUTPATIENT
Start: 2017-10-27 | End: 2017-11-08 | Stop reason: SDUPTHER

## 2017-10-27 RX ORDER — DEXTROAMPHETAMINE SACCHARATE, AMPHETAMINE ASPARTATE, DEXTROAMPHETAMINE SULFATE AND AMPHETAMINE SULFATE 5; 5; 5; 5 MG/1; MG/1; MG/1; MG/1
1 TABLET ORAL DAILY
Qty: 30 TABLET | Refills: 0 | Status: SHIPPED | OUTPATIENT
Start: 2017-10-27 | End: 2017-11-08 | Stop reason: SDUPTHER

## 2017-10-27 RX ORDER — CLONAZEPAM 0.5 MG/1
TABLET ORAL
Qty: 45 TABLET | Refills: 0 | Status: SHIPPED | OUTPATIENT
Start: 2017-10-27 | End: 2017-11-08 | Stop reason: SDUPTHER

## 2017-11-08 ENCOUNTER — OFFICE VISIT (OUTPATIENT)
Dept: PSYCHIATRY | Facility: CLINIC | Age: 32
End: 2017-11-08
Payer: MEDICAID

## 2017-11-08 VITALS
HEIGHT: 67 IN | SYSTOLIC BLOOD PRESSURE: 146 MMHG | DIASTOLIC BLOOD PRESSURE: 86 MMHG | BODY MASS INDEX: 26.21 KG/M2 | WEIGHT: 167 LBS | HEART RATE: 121 BPM

## 2017-11-08 DIAGNOSIS — F90.2 ADHD (ATTENTION DEFICIT HYPERACTIVITY DISORDER), COMBINED TYPE: ICD-10-CM

## 2017-11-08 DIAGNOSIS — F41.9 ANXIETY: ICD-10-CM

## 2017-11-08 PROCEDURE — 99999 PR PBB SHADOW E&M-EST. PATIENT-LVL III: CPT | Mod: PBBFAC,,,

## 2017-11-08 PROCEDURE — 99213 OFFICE O/P EST LOW 20 MIN: CPT | Mod: PBBFAC

## 2017-11-08 PROCEDURE — 99213 OFFICE O/P EST LOW 20 MIN: CPT | Mod: SA,HB,S$PBB,

## 2017-11-08 RX ORDER — DEXTROAMPHETAMINE SACCHARATE, AMPHETAMINE ASPARTATE, DEXTROAMPHETAMINE SULFATE AND AMPHETAMINE SULFATE 7.5; 7.5; 7.5; 7.5 MG/1; MG/1; MG/1; MG/1
30 TABLET ORAL 2 TIMES DAILY
Qty: 60 TABLET | Refills: 0 | Status: SHIPPED | OUTPATIENT
Start: 2017-11-26 | End: 2018-03-01 | Stop reason: SDUPTHER

## 2017-11-08 RX ORDER — CLONAZEPAM 1 MG/1
1 TABLET ORAL DAILY
Qty: 30 TABLET | Refills: 1 | Status: SHIPPED | OUTPATIENT
Start: 2017-11-26 | End: 2018-01-25 | Stop reason: SDUPTHER

## 2017-11-08 RX ORDER — DEXTROAMPHETAMINE SACCHARATE, AMPHETAMINE ASPARTATE, DEXTROAMPHETAMINE SULFATE AND AMPHETAMINE SULFATE 5; 5; 5; 5 MG/1; MG/1; MG/1; MG/1
1 TABLET ORAL DAILY
Qty: 30 TABLET | Refills: 0 | Status: SHIPPED | OUTPATIENT
Start: 2017-11-26 | End: 2018-01-25 | Stop reason: SDUPTHER

## 2017-11-08 RX ORDER — CLONAZEPAM 0.5 MG/1
TABLET ORAL
Qty: 45 TABLET | Refills: 1 | Status: SHIPPED | OUTPATIENT
Start: 2017-11-26 | End: 2018-01-25 | Stop reason: SDUPTHER

## 2017-11-08 RX ORDER — DEXTROAMPHETAMINE SACCHARATE, AMPHETAMINE ASPARTATE, DEXTROAMPHETAMINE SULFATE AND AMPHETAMINE SULFATE 7.5; 7.5; 7.5; 7.5 MG/1; MG/1; MG/1; MG/1
30 TABLET ORAL 2 TIMES DAILY
Qty: 60 TABLET | Refills: 0 | Status: SHIPPED | OUTPATIENT
Start: 2017-12-25 | End: 2018-01-25 | Stop reason: SDUPTHER

## 2017-11-08 RX ORDER — DEXTROAMPHETAMINE SACCHARATE, AMPHETAMINE ASPARTATE, DEXTROAMPHETAMINE SULFATE AND AMPHETAMINE SULFATE 5; 5; 5; 5 MG/1; MG/1; MG/1; MG/1
1 TABLET ORAL DAILY
Qty: 30 TABLET | Refills: 0 | Status: SHIPPED | OUTPATIENT
Start: 2017-12-25 | End: 2018-03-01 | Stop reason: SDUPTHER

## 2017-11-08 NOTE — PROGRESS NOTES
Outpatient Psychiatry Follow-Up Visit (MD/NP)    11/8/2017    Clinical Status of Patient:  Outpatient (Ambulatory)    Chief Complaint:  Terri Summers is a 32 y.o. female who presents today for follow-up of anxiety and attention problems.  Met with patient.      Interval History and Content of Current Session:  Interim Events/Subjective Report/Content of Current Session: 18 minutes late for 30 minute appointment. Patient denies increase in anxiety with reduced dose of klonopin, we discussed continuing the taper in January of 2018. Compliant wth adderall, effexor, and klonopin daily, denies any negative side effects from any of these medications, reports they are still therapeutic for her.     Review of Systems   Review of Systems   Constitutional: Negative for diaphoresis.   HENT: Negative for congestion.    Eyes: Negative for discharge.   Respiratory: Negative for apnea.    Cardiovascular: Negative for leg swelling.   Gastrointestinal: Negative for abdominal distention.   Endocrine: Negative for cold intolerance.   Genitourinary: Negative for flank pain.   Musculoskeletal: Negative for gait problem.   Skin: Negative for color change.   Allergic/Immunologic: Negative for immunocompromised state.   Neurological: Negative for dizziness.   Hematological: Negative for adenopathy.   Psychiatric/Behavioral: Negative for suicidal ideas.     Past Medical, Family and Social History: The patient's past medical, family and social history have been reviewed and updated as appropriate within the electronic medical record - see encounter notes.    Compliance: yes    Side effects: None    Risk Parameters:  Patient reports no suicidal ideation  Patient reports no homicidal ideation  Patient reports no self-injurious behavior  Patient reports no violent behavior    Exam (detailed: at least 9 elements; comprehensive: all 15 elements)   Constitutional  Vitals:  Most recent vital signs, dated less than 90 days prior to this appointment,  "were reviewed.   Vitals:    11/08/17 0918   BP: (!) 146/86   Pulse: (!) 121   Weight: 75.8 kg (167 lb)   Height: 5' 7" (1.702 m)        General:  unremarkable, age appropriate, normal weight, casually dressed, neatly groomed     Musculoskeletal  Muscle Strength/Tone:  not examined   Gait & Station:  non-ataxic     Psychiatric  Speech:  no latency; no press   Mood & Affect:  steady  congruent and appropriate   Thought Process:  normal and logical   Associations:  intact   Thought Content:  normal, no suicidality, no homicidality, delusions, or paranoia   Insight:  intact   Judgement: behavior is adequate to circumstances   Orientation:  grossly intact   Memory: intact for content of interview   Language: grossly intact   Attention Span & Concentration:  able to focus   Fund of Knowledge:  intact and appropriate to age and level of education, not tested     Assessment and Diagnosis   Status/Progress: Based on the examination today, the patient's problem(s) is/are well controlled.  New problems have not been presented today.   Co-morbidities, Diagnostic uncertainty and Lack of compliance are not complicating management of the primary condition.  There are no active rule-out diagnoses for this patient at this time.     General Impression: 31-year-old woman initially presented for anxiety, depression, and ADHD, depression and anxiety were improving on effexor until patient stopped her medications for four days in 12/16 and was hospitalized, patient has been compliant with all her medications since then and reports her symptoms have improved, ADHD symptoms also well-controlled on medication. Anxiety symptoms not ideally controlled, we discussed increasing effexor to better manage this. Patient responding well to higher dose of effexor, we discussed starting to taper off klonopin. Patient doing well with this so far, we discussed continuing in January.       ICD-10-CM ICD-9-CM   1. Anxiety F41.9 300.00   2. ADHD (attention " deficit hyperactivity disorder), combined type F90.2 314.01       Intervention/Counseling/Treatment Plan   · Continue effexor XR 225mg daily for anxiety.   · Continue klonopin 1mg qAM & 0.75mg qPM, discussed risks, benefits, and alternativse to this medication.   · Continue adderall 30mg twice daily and 20mg daily.    Return to Clinic: January 2018  Time spent face-to-face with patient: 8 minutes, >50% spent in counseling

## 2017-11-27 DIAGNOSIS — F41.9 ANXIETY: ICD-10-CM

## 2017-11-27 RX ORDER — VENLAFAXINE HYDROCHLORIDE 75 MG/1
CAPSULE, EXTENDED RELEASE ORAL
Qty: 90 CAPSULE | Refills: 0 | OUTPATIENT
Start: 2017-11-27

## 2017-11-28 RX ORDER — VENLAFAXINE HYDROCHLORIDE 75 MG/1
225 CAPSULE, EXTENDED RELEASE ORAL DAILY
Qty: 90 CAPSULE | Refills: 11 | Status: SHIPPED | OUTPATIENT
Start: 2017-11-28 | End: 2018-10-08 | Stop reason: SDUPTHER

## 2017-12-04 ENCOUNTER — TELEPHONE (OUTPATIENT)
Dept: OBSTETRICS AND GYNECOLOGY | Facility: CLINIC | Age: 32
End: 2017-12-04

## 2017-12-04 DIAGNOSIS — R30.0 DYSURIA: Primary | ICD-10-CM

## 2017-12-04 NOTE — TELEPHONE ENCOUNTER
Needs a urine culture because if it didn't get better or reoccurred then might have been wrong medicine or could have been something else  Orders signed for urine culture

## 2017-12-04 NOTE — TELEPHONE ENCOUNTER
Patient states she is having a bladder infection again. She was prescribed bactrim before by a previous DrMarcelo And she states she is having frequent urination. It hurts when she does void and there is not much urine when she does go. She would like to know if she can be prescribed something for a UTI. Please advise.

## 2017-12-04 NOTE — TELEPHONE ENCOUNTER
Contacted patient to inform her we need a urine culture. Patient will leave at the lab. Informed to take azo standard otc until urine culture comes back Patient verbalized understanding.

## 2017-12-04 NOTE — TELEPHONE ENCOUNTER
----- Message from Reji Ortega sent at 12/4/2017 12:47 PM CST -----  Contact: [phone]/self  Pt requesting a prescription for a possible bladder infection.  Please call and advise

## 2018-01-24 ENCOUNTER — PATIENT MESSAGE (OUTPATIENT)
Dept: PSYCHIATRY | Facility: CLINIC | Age: 33
End: 2018-01-24

## 2018-01-25 DIAGNOSIS — F90.2 ADHD (ATTENTION DEFICIT HYPERACTIVITY DISORDER), COMBINED TYPE: ICD-10-CM

## 2018-01-25 DIAGNOSIS — F41.9 ANXIETY: ICD-10-CM

## 2018-01-25 RX ORDER — CLONAZEPAM 1 MG/1
1 TABLET ORAL DAILY
Qty: 30 TABLET | Refills: 1 | Status: SHIPPED | OUTPATIENT
Start: 2018-01-25 | End: 2018-03-01 | Stop reason: SDUPTHER

## 2018-01-25 RX ORDER — DEXTROAMPHETAMINE SACCHARATE, AMPHETAMINE ASPARTATE, DEXTROAMPHETAMINE SULFATE AND AMPHETAMINE SULFATE 7.5; 7.5; 7.5; 7.5 MG/1; MG/1; MG/1; MG/1
30 TABLET ORAL 2 TIMES DAILY
Qty: 60 TABLET | Refills: 0 | Status: SHIPPED | OUTPATIENT
Start: 2018-01-25 | End: 2018-01-29 | Stop reason: SDUPTHER

## 2018-01-25 RX ORDER — DEXTROAMPHETAMINE SACCHARATE, AMPHETAMINE ASPARTATE, DEXTROAMPHETAMINE SULFATE AND AMPHETAMINE SULFATE 5; 5; 5; 5 MG/1; MG/1; MG/1; MG/1
1 TABLET ORAL DAILY
Qty: 30 TABLET | Refills: 0 | Status: SHIPPED | OUTPATIENT
Start: 2018-01-25 | End: 2018-01-29 | Stop reason: SDUPTHER

## 2018-01-25 RX ORDER — CLONAZEPAM 0.5 MG/1
TABLET ORAL
Qty: 45 TABLET | Refills: 1 | Status: SHIPPED | OUTPATIENT
Start: 2018-01-25 | End: 2018-03-01 | Stop reason: SDUPTHER

## 2018-01-29 ENCOUNTER — OFFICE VISIT (OUTPATIENT)
Dept: OBSTETRICS AND GYNECOLOGY | Facility: CLINIC | Age: 33
End: 2018-01-29
Payer: MEDICAID

## 2018-01-29 ENCOUNTER — TELEPHONE (OUTPATIENT)
Dept: OBSTETRICS AND GYNECOLOGY | Facility: CLINIC | Age: 33
End: 2018-01-29

## 2018-01-29 VITALS
SYSTOLIC BLOOD PRESSURE: 116 MMHG | DIASTOLIC BLOOD PRESSURE: 60 MMHG | HEIGHT: 67 IN | WEIGHT: 177.69 LBS | BODY MASS INDEX: 27.89 KG/M2

## 2018-01-29 DIAGNOSIS — R30.0 DYSURIA: Primary | ICD-10-CM

## 2018-01-29 PROCEDURE — 99213 OFFICE O/P EST LOW 20 MIN: CPT | Mod: PBBFAC,PO | Performed by: OBSTETRICS & GYNECOLOGY

## 2018-01-29 PROCEDURE — 99212 OFFICE O/P EST SF 10 MIN: CPT | Mod: S$PBB,,, | Performed by: OBSTETRICS & GYNECOLOGY

## 2018-01-29 PROCEDURE — 87086 URINE CULTURE/COLONY COUNT: CPT

## 2018-01-29 PROCEDURE — 99999 PR PBB SHADOW E&M-EST. PATIENT-LVL III: CPT | Mod: PBBFAC,,, | Performed by: OBSTETRICS & GYNECOLOGY

## 2018-01-29 RX ORDER — NITROFURANTOIN 25; 75 MG/1; MG/1
100 CAPSULE ORAL 2 TIMES DAILY
Qty: 14 CAPSULE | Refills: 0 | Status: SHIPPED | OUTPATIENT
Start: 2018-01-29 | End: 2018-02-05

## 2018-01-29 NOTE — PROGRESS NOTES
GYNECOLOGY OFFICE NOTE    Reason for visit: dysuria    HPI: Pt is a 32 y.o.  female  who presents for evaluation of dysuria that is intermittent x 1 yr. Taking AZO tablets which help but then symptoms return right after stopping. Denies hematuria. Some back pain and night sweats. Mirena inserted 2017- no cycles while in place. She is not sexually active. Last pap: 3/2016-negative pap/hpv.     Past Medical History:   Diagnosis Date    ADHD (attention deficit hyperactivity disorder)     Anxiety     History of psychiatric care     Psychiatric exam requested by authority     Psychiatric problem     Therapy        Past Surgical History:   Procedure Laterality Date     SECTION      DILATION AND CURETTAGE OF UTERUS      missed ab       Family History   Problem Relation Age of Onset    ADD / ADHD Father        Social History   Substance Use Topics    Smoking status: Current Every Day Smoker    Smokeless tobacco: Not on file    Alcohol use Yes      Comment: rare drink occasionally       OB History    Para Term  AB Living   1 1 1     2   SAB TAB Ectopic Multiple Live Births         1 2      # Outcome Date GA Lbr [place]/2nd Weight Sex Delivery Anes PTL Lv   1A Term 13    F CS-LTranv   BRENNAN   1B Term 13    F CS-LTranv   BRENNAN          Current Outpatient Prescriptions   Medication Sig    clonazePAM (KLONOPIN) 0.5 MG tablet Take 1.5 tablets (0.75mg) daily at 4pm.    clonazePAM (KLONOPIN) 1 MG tablet Take 1 tablet (1 mg total) by mouth once daily.    dextroamphetamine-amphetamine (ADDERALL) 20 mg tablet Take 1 tablet by mouth once daily.    dextroamphetamine-amphetamine (ADDERALL) 30 mg Tab Take 30 mg by mouth 2 (two) times daily.    venlafaxine (EFFEXOR-XR) 75 MG 24 hr capsule Take 3 capsules (225 mg total) by mouth once daily.    nitrofurantoin, macrocrystal-monohydrate, (MACROBID) 100 MG capsule Take 1 capsule (100 mg total) by mouth 2 (two) times daily.     No  "current facility-administered medications for this visit.        Allergies: Patient has no known allergies.     /60   Ht 5' 7" (1.702 m)   Wt 80.6 kg (177 lb 11.1 oz)   BMI 27.83 kg/m²     ROS:  GENERAL: Denies fever or chills.   SKIN: Denies rash or lesions.   HEAD: Denies head injury or headache.   CHEST: Denies chest pain or shortness of breath.   CARDIOVASCULAR: Denies palpitations or chest pain.   ABDOMEN: No abdominal pain, constipation, diarrhea, nausea, vomiting or rectal bleeding.   URINARY: No dysuria, hematuria, or burning on urination.  REPRODUCTIVE: See HPI.   BREASTS: Denies pain, lumps, or nipple discharge.   NEUROLOGIC: Denies syncope or weakness.     Physical Exam:  GENERAL: alert, appears stated age and cooperative  CHEST: Normal respiratory effort  HEART: S1 and S2 normal, regular rate and rhythm  NECK: normal appearance, no thyromegaly masses or tenderness  SKIN: no acne, striae, hirsutism  BREAST EXAM: not examined  ABDOMEN: abdomen is soft without significant tenderness, masses, organomegaly or guarding, no hernias noted  Talk only    Diagnosis:  1. Dysuria        Plan:   1. udip was clear. Will send for culture- macrobid sent while awaiting.     Orders Placed This Encounter    Urine culture    POCT URINE DIPSTICK WITHOUT MICROSCOPE    nitrofurantoin, macrocrystal-monohydrate, (MACROBID) 100 MG capsule       Patient was counseled today on the new ACS guidelines for cervical cytology screening as well as the current recommendations for breast cancer screening. She was counseled to follow up with her PCP for other routine health maintenance.     Follow-up if symptoms worsen or fail to improve.      Carmen Yoder MD  OB/GYN  Pager: [phone]        "

## 2018-01-29 NOTE — TELEPHONE ENCOUNTER
Contacted pt regarding missed appt today at 10:30am. Pt states she needs to reschedule to later today. Pt scheduled for 2:15pm. Patient verbalized understanding.

## 2018-01-31 LAB — BACTERIA UR CULT: NORMAL

## 2018-02-11 ENCOUNTER — HOSPITAL ENCOUNTER (EMERGENCY)
Facility: HOSPITAL | Age: 33
Discharge: HOME | End: 2018-02-11
Attending: EMERGENCY MEDICINE
Payer: MEDICAID

## 2018-02-11 VITALS
DIASTOLIC BLOOD PRESSURE: 80 MMHG | SYSTOLIC BLOOD PRESSURE: 130 MMHG | RESPIRATION RATE: 20 BRPM | OXYGEN SATURATION: 98 % | BODY MASS INDEX: 27.78 KG/M2 | WEIGHT: 177 LBS | HEIGHT: 67 IN | HEART RATE: 90 BPM

## 2018-02-11 DIAGNOSIS — M54.2 NECK PAIN: ICD-10-CM

## 2018-02-11 DIAGNOSIS — S60.419A ABRASION OF FINGER, INITIAL ENCOUNTER: ICD-10-CM
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
## 2018-02-11 DIAGNOSIS — T07.XXXA MULTIPLE CONTUSIONS: ICD-10-CM

## 2018-02-11 DIAGNOSIS — S00.33XA CONTUSION OF NOSE, INITIAL ENCOUNTER: ICD-10-CM

## 2018-02-11 DIAGNOSIS — M54.6 ACUTE LEFT-SIDED THORACIC BACK PAIN: ICD-10-CM

## 2018-02-11 DIAGNOSIS — Z23 TETANUS-DIPHTHERIA (TD) VACCINATION: ICD-10-CM
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
## 2018-02-11 LAB
ALBUMIN SERPL BCP-MCNC: 3.9 G/DL
ALP SERPL-CCNC: 66 U/L
ALT SERPL W/O P-5'-P-CCNC: 19 U/L
AMPHET+METHAMPHET UR QL: NORMAL
ANION GAP SERPL CALC-SCNC: 10 MMOL/L
AST SERPL-CCNC: 26 U/L
B-HCG UR QL: NEGATIVE
BARBITURATES UR QL SCN>200 NG/ML: NEGATIVE
BASOPHILS # BLD AUTO: 0.02 K/UL
BASOPHILS NFR BLD: 0.2 %
BENZODIAZ UR QL SCN>200 NG/ML: NEGATIVE
BILIRUB SERPL-MCNC: 0.6 MG/DL
BILIRUB UR QL STRIP: NEGATIVE
BUN SERPL-MCNC: 11 MG/DL
BZE UR QL SCN: NEGATIVE
CALCIUM SERPL-MCNC: 9.1 MG/DL
CANNABINOIDS UR QL SCN: NORMAL
CHLORIDE SERPL-SCNC: 105 MMOL/L
CLARITY UR: CLEAR
CO2 SERPL-SCNC: 24 MMOL/L
COLOR UR: YELLOW
CREAT SERPL-MCNC: 0.7 MG/DL
CREAT UR-MCNC: 47.8 MG/DL
CTP QC/QA: YES
DIFFERENTIAL METHOD: ABNORMAL
EOSINOPHIL # BLD AUTO: 0.2 K/UL
EOSINOPHIL NFR BLD: 2.4 %
ERYTHROCYTE [DISTWIDTH] IN BLOOD BY AUTOMATED COUNT: 12.2 %
EST. GFR  (AFRICAN AMERICAN): >60 ML/MIN/1.73 M^2
EST. GFR  (NON AFRICAN AMERICAN): >60 ML/MIN/1.73 M^2
ETHANOL SERPL-MCNC: 22 MG/DL
GLUCOSE SERPL-MCNC: 88 MG/DL
GLUCOSE UR QL STRIP: NEGATIVE
HCT VFR BLD AUTO: 38.5 %
HGB BLD-MCNC: 13.2 G/DL
HGB UR QL STRIP: NEGATIVE
KETONES UR QL STRIP: NEGATIVE
LEUKOCYTE ESTERASE UR QL STRIP: NEGATIVE
LYMPHOCYTES # BLD AUTO: 1.3 K/UL
LYMPHOCYTES NFR BLD: 15.2 %
MCH RBC QN AUTO: 31.7 PG
MCHC RBC AUTO-ENTMCNC: 34.3 G/DL
MCV RBC AUTO: 92 FL
METHADONE UR QL SCN>300 NG/ML: NEGATIVE
MONOCYTES # BLD AUTO: 0.6 K/UL
MONOCYTES NFR BLD: 6.8 %
NEUTROPHILS # BLD AUTO: 6.3 K/UL
NEUTROPHILS NFR BLD: 75.2 %
NITRITE UR QL STRIP: NEGATIVE
OPIATES UR QL SCN: NORMAL
PCP UR QL SCN>25 NG/ML: NEGATIVE
PH UR STRIP: 6 [PH] (ref 5–8)
PLATELET # BLD AUTO: 196 K/UL
PMV BLD AUTO: 9.5 FL
POTASSIUM SERPL-SCNC: 4 MMOL/L
PROT SERPL-MCNC: 6.7 G/DL
PROT UR QL STRIP: NEGATIVE
RBC # BLD AUTO: 4.17 M/UL
SODIUM SERPL-SCNC: 139 MMOL/L
SP GR UR STRIP: <=1.005 (ref 1–1.03)
TOXICOLOGY INFORMATION: NORMAL
URN SPEC COLLECT METH UR: ABNORMAL
UROBILINOGEN UR STRIP-ACNC: NEGATIVE EU/DL
WBC # BLD AUTO: 8.43 K/UL

## 2018-02-11 PROCEDURE — 85025 COMPLETE CBC W/AUTO DIFF WBC: CPT

## 2018-02-11 PROCEDURE — 25000003 PHARM REV CODE 250: Performed by: EMERGENCY MEDICINE

## 2018-02-11 PROCEDURE — 99285 EMERGENCY DEPT VISIT HI MDM: CPT | Mod: 25

## 2018-02-11 PROCEDURE — 90715 TDAP VACCINE 7 YRS/> IM: CPT | Performed by: NURSE PRACTITIONER

## 2018-02-11 PROCEDURE — 80320 DRUG SCREEN QUANTALCOHOLS: CPT

## 2018-02-11 PROCEDURE — 90471 IMMUNIZATION ADMIN: CPT | Performed by: NURSE PRACTITIONER

## 2018-02-11 PROCEDURE — 80053 COMPREHEN METABOLIC PANEL: CPT

## 2018-02-11 PROCEDURE — 81003 URINALYSIS AUTO W/O SCOPE: CPT | Mod: 59

## 2018-02-11 PROCEDURE — 96360 HYDRATION IV INFUSION INIT: CPT

## 2018-02-11 PROCEDURE — 80307 DRUG TEST PRSMV CHEM ANLYZR: CPT

## 2018-02-11 PROCEDURE — 63600175 PHARM REV CODE 636 W HCPCS: Performed by: NURSE PRACTITIONER

## 2018-02-11 PROCEDURE — 25000003 PHARM REV CODE 250: Performed by: NURSE PRACTITIONER

## 2018-02-11 PROCEDURE — 81025 URINE PREGNANCY TEST: CPT | Performed by: NURSE PRACTITIONER

## 2018-02-11 RX ORDER — NAPROXEN 500 MG/1
500 TABLET ORAL 2 TIMES DAILY WITH MEALS
Qty: 14 TABLET | Refills: 0 | Status: SHIPPED | OUTPATIENT
Start: 2018-02-11 | End: 2018-08-22

## 2018-02-11 RX ORDER — BACITRACIN ZINC 500 UNIT/G
OINTMENT (GRAM) TOPICAL 2 TIMES DAILY
Qty: 30 G | Refills: 0 | Status: SHIPPED | OUTPATIENT
Start: 2018-02-11 | End: 2018-08-22

## 2018-02-11 RX ADMIN — BACITRACIN, NEOMYCIN, POLYMYXIN B 1 EACH: 400; 3.5; 5 OINTMENT TOPICAL at 11:02

## 2018-02-11 RX ADMIN — CLOSTRIDIUM TETANI TOXOID ANTIGEN (FORMALDEHYDE INACTIVATED), CORYNEBACTERIUM DIPHTHERIAE TOXOID ANTIGEN (FORMALDEHYDE INACTIVATED), BORDETELLA PERTUSSIS TOXOID ANTIGEN (GLUTARALDEHYDE INACTIVATED), BORDETELLA PERTUSSIS FILAMENTOUS HEMAGGLUTININ ANTIGEN (FORMALDEHYDE INACTIVATED), BORDETELLA PERTUSSIS PERTACTIN ANTIGEN, AND BORDETELLA PERTUSSIS FIMBRIAE 2/3 ANTIGEN 0.5 ML: 5; 2; 2.5; 5; 3; 5 INJECTION, SUSPENSION INTRAMUSCULAR at 11:02

## 2018-02-11 RX ADMIN — SODIUM CHLORIDE 1000 ML: 0.9 INJECTION, SOLUTION INTRAVENOUS at 11:02
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
## 2018-02-11 NOTE — DISCHARGE INSTRUCTIONS
Use ice to help with pain and swelling. Return to the ED if your condition changes,progresses, or if you have any concerns.
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
## 2018-03-01 DIAGNOSIS — F41.9 ANXIETY: ICD-10-CM

## 2018-03-01 DIAGNOSIS — F90.2 ADHD (ATTENTION DEFICIT HYPERACTIVITY DISORDER), COMBINED TYPE: ICD-10-CM

## 2018-03-01 RX ORDER — DEXTROAMPHETAMINE SACCHARATE, AMPHETAMINE ASPARTATE, DEXTROAMPHETAMINE SULFATE AND AMPHETAMINE SULFATE 5; 5; 5; 5 MG/1; MG/1; MG/1; MG/1
1 TABLET ORAL DAILY
Qty: 30 TABLET | Refills: 0 | Status: SHIPPED | OUTPATIENT
Start: 2018-03-01 | End: 2018-03-27 | Stop reason: SDUPTHER

## 2018-03-01 RX ORDER — CLONAZEPAM 0.5 MG/1
TABLET ORAL
Qty: 45 TABLET | Refills: 1 | Status: SHIPPED | OUTPATIENT
Start: 2018-03-01 | End: 2018-03-27 | Stop reason: SDUPTHER

## 2018-03-01 RX ORDER — CLONAZEPAM 1 MG/1
1 TABLET ORAL DAILY
Qty: 30 TABLET | Refills: 1 | Status: SHIPPED | OUTPATIENT
Start: 2018-03-01 | End: 2018-03-27 | Stop reason: SDUPTHER

## 2018-03-01 RX ORDER — DEXTROAMPHETAMINE SACCHARATE, AMPHETAMINE ASPARTATE, DEXTROAMPHETAMINE SULFATE AND AMPHETAMINE SULFATE 7.5; 7.5; 7.5; 7.5 MG/1; MG/1; MG/1; MG/1
30 TABLET ORAL 2 TIMES DAILY
Qty: 60 TABLET | Refills: 0 | Status: SHIPPED | OUTPATIENT
Start: 2018-03-01 | End: 2018-03-27 | Stop reason: SDUPTHER

## 2018-03-27 DIAGNOSIS — F41.9 ANXIETY: ICD-10-CM

## 2018-03-27 DIAGNOSIS — F90.2 ADHD (ATTENTION DEFICIT HYPERACTIVITY DISORDER), COMBINED TYPE: ICD-10-CM

## 2018-03-27 RX ORDER — CLONAZEPAM 1 MG/1
1 TABLET ORAL DAILY
Qty: 30 TABLET | Refills: 1 | Status: SHIPPED | OUTPATIENT
Start: 2018-03-27 | End: 2018-05-29 | Stop reason: SDUPTHER

## 2018-03-27 RX ORDER — DEXTROAMPHETAMINE SACCHARATE, AMPHETAMINE ASPARTATE, DEXTROAMPHETAMINE SULFATE AND AMPHETAMINE SULFATE 5; 5; 5; 5 MG/1; MG/1; MG/1; MG/1
1 TABLET ORAL DAILY
Qty: 30 TABLET | Refills: 0 | Status: SHIPPED | OUTPATIENT
Start: 2018-03-27 | End: 2018-05-04 | Stop reason: SDUPTHER

## 2018-03-27 RX ORDER — CLONAZEPAM 0.5 MG/1
TABLET ORAL
Qty: 45 TABLET | Refills: 1 | Status: SHIPPED | OUTPATIENT
Start: 2018-03-27 | End: 2018-05-29 | Stop reason: DRUGHIGH

## 2018-03-27 RX ORDER — DEXTROAMPHETAMINE SACCHARATE, AMPHETAMINE ASPARTATE, DEXTROAMPHETAMINE SULFATE AND AMPHETAMINE SULFATE 7.5; 7.5; 7.5; 7.5 MG/1; MG/1; MG/1; MG/1
30 TABLET ORAL 2 TIMES DAILY
Qty: 60 TABLET | Refills: 0 | Status: SHIPPED | OUTPATIENT
Start: 2018-03-27 | End: 2018-05-04 | Stop reason: SDUPTHER

## 2018-04-26 DIAGNOSIS — F90.2 ADHD (ATTENTION DEFICIT HYPERACTIVITY DISORDER), COMBINED TYPE: ICD-10-CM

## 2018-04-26 RX ORDER — DEXTROAMPHETAMINE SACCHARATE, AMPHETAMINE ASPARTATE, DEXTROAMPHETAMINE SULFATE AND AMPHETAMINE SULFATE 7.5; 7.5; 7.5; 7.5 MG/1; MG/1; MG/1; MG/1
30 TABLET ORAL 2 TIMES DAILY
Qty: 60 TABLET | Refills: 0 | OUTPATIENT
Start: 2018-04-26

## 2018-04-26 RX ORDER — DEXTROAMPHETAMINE SACCHARATE, AMPHETAMINE ASPARTATE, DEXTROAMPHETAMINE SULFATE AND AMPHETAMINE SULFATE 5; 5; 5; 5 MG/1; MG/1; MG/1; MG/1
1 TABLET ORAL DAILY
Qty: 30 TABLET | Refills: 0 | OUTPATIENT
Start: 2018-04-26

## 2018-04-26 NOTE — TELEPHONE ENCOUNTER
John Murray,     Because I haven't seen you within the last three months, I'm not going to be able to authorize this refill until your next office visit. If you already have an appointment scheduled and need to come in earlier, please call [phone] to check if there have been any cancellations.     ThanksMiranda

## 2018-05-01 DIAGNOSIS — F90.2 ADHD (ATTENTION DEFICIT HYPERACTIVITY DISORDER), COMBINED TYPE: ICD-10-CM

## 2018-05-01 RX ORDER — DEXTROAMPHETAMINE SACCHARATE, AMPHETAMINE ASPARTATE, DEXTROAMPHETAMINE SULFATE AND AMPHETAMINE SULFATE 7.5; 7.5; 7.5; 7.5 MG/1; MG/1; MG/1; MG/1
30 TABLET ORAL 2 TIMES DAILY
Qty: 60 TABLET | Refills: 0 | OUTPATIENT
Start: 2018-05-01

## 2018-05-01 RX ORDER — DEXTROAMPHETAMINE SACCHARATE, AMPHETAMINE ASPARTATE, DEXTROAMPHETAMINE SULFATE AND AMPHETAMINE SULFATE 5; 5; 5; 5 MG/1; MG/1; MG/1; MG/1
1 TABLET ORAL DAILY
Qty: 30 TABLET | Refills: 0 | OUTPATIENT
Start: 2018-05-01

## 2018-05-01 NOTE — TELEPHONE ENCOUNTER
Patient has not been seen in the office since 11/8, so I am unable to refill this medication until her next appointment.

## 2018-05-04 DIAGNOSIS — F90.2 ADHD (ATTENTION DEFICIT HYPERACTIVITY DISORDER), COMBINED TYPE: ICD-10-CM

## 2018-05-04 RX ORDER — DEXTROAMPHETAMINE SACCHARATE, AMPHETAMINE ASPARTATE, DEXTROAMPHETAMINE SULFATE AND AMPHETAMINE SULFATE 7.5; 7.5; 7.5; 7.5 MG/1; MG/1; MG/1; MG/1
30 TABLET ORAL 2 TIMES DAILY
Qty: 34 TABLET | Refills: 0 | Status: SHIPPED | OUTPATIENT
Start: 2018-05-04 | End: 2018-05-29 | Stop reason: SDUPTHER

## 2018-05-04 RX ORDER — DEXTROAMPHETAMINE SACCHARATE, AMPHETAMINE ASPARTATE, DEXTROAMPHETAMINE SULFATE AND AMPHETAMINE SULFATE 5; 5; 5; 5 MG/1; MG/1; MG/1; MG/1
1 TABLET ORAL DAILY
Qty: 17 TABLET | Refills: 0 | Status: SHIPPED | OUTPATIENT
Start: 2018-05-04 | End: 2018-05-29 | Stop reason: DRUGHIGH

## 2018-05-29 ENCOUNTER — OFFICE VISIT (OUTPATIENT)
Dept: PSYCHIATRY | Facility: CLINIC | Age: 33
End: 2018-05-29

## 2018-05-29 VITALS
WEIGHT: 165.25 LBS | BODY MASS INDEX: 25.94 KG/M2 | DIASTOLIC BLOOD PRESSURE: 88 MMHG | HEART RATE: 71 BPM | SYSTOLIC BLOOD PRESSURE: 128 MMHG | HEIGHT: 67 IN

## 2018-05-29 DIAGNOSIS — F41.9 ANXIETY: ICD-10-CM

## 2018-05-29 DIAGNOSIS — F90.2 ADHD (ATTENTION DEFICIT HYPERACTIVITY DISORDER), COMBINED TYPE: ICD-10-CM

## 2018-05-29 PROCEDURE — 99999 PR PBB SHADOW E&M-EST. PATIENT-LVL II: CPT | Mod: PBBFAC,,,

## 2018-05-29 PROCEDURE — 99212 OFFICE O/P EST SF 10 MIN: CPT | Mod: PBBFAC

## 2018-05-29 PROCEDURE — 99213 OFFICE O/P EST LOW 20 MIN: CPT | Mod: S$PBB,,,

## 2018-05-29 RX ORDER — DEXTROAMPHETAMINE SACCHARATE, AMPHETAMINE ASPARTATE, DEXTROAMPHETAMINE SULFATE AND AMPHETAMINE SULFATE 7.5; 7.5; 7.5; 7.5 MG/1; MG/1; MG/1; MG/1
30 TABLET ORAL 2 TIMES DAILY
Qty: 60 TABLET | Refills: 0 | Status: SHIPPED | OUTPATIENT
Start: 2018-06-28 | End: 2018-08-22 | Stop reason: SDUPTHER

## 2018-05-29 RX ORDER — DEXTROAMPHETAMINE SACCHARATE, AMPHETAMINE ASPARTATE, DEXTROAMPHETAMINE SULFATE AND AMPHETAMINE SULFATE 7.5; 7.5; 7.5; 7.5 MG/1; MG/1; MG/1; MG/1
30 TABLET ORAL 2 TIMES DAILY
Qty: 60 TABLET | Refills: 0 | Status: SHIPPED | OUTPATIENT
Start: 2018-05-29 | End: 2021-12-21 | Stop reason: SDUPTHER

## 2018-05-29 RX ORDER — DEXTROAMPHETAMINE SACCHARATE, AMPHETAMINE ASPARTATE, DEXTROAMPHETAMINE SULFATE AND AMPHETAMINE SULFATE 5; 5; 5; 5 MG/1; MG/1; MG/1; MG/1
1 TABLET ORAL DAILY
Qty: 30 TABLET | Refills: 0 | Status: SHIPPED | OUTPATIENT
Start: 2018-07-27 | End: 2018-08-22 | Stop reason: SDUPTHER

## 2018-05-29 RX ORDER — DEXTROAMPHETAMINE SACCHARATE, AMPHETAMINE ASPARTATE, DEXTROAMPHETAMINE SULFATE AND AMPHETAMINE SULFATE 5; 5; 5; 5 MG/1; MG/1; MG/1; MG/1
1 TABLET ORAL DAILY
Qty: 30 TABLET | Refills: 0 | Status: SHIPPED | OUTPATIENT
Start: 2018-05-29 | End: 2018-10-08 | Stop reason: SDUPTHER

## 2018-05-29 RX ORDER — DEXTROAMPHETAMINE SACCHARATE, AMPHETAMINE ASPARTATE, DEXTROAMPHETAMINE SULFATE AND AMPHETAMINE SULFATE 5; 5; 5; 5 MG/1; MG/1; MG/1; MG/1
1 TABLET ORAL DAILY
Qty: 30 TABLET | Refills: 0 | Status: SHIPPED | OUTPATIENT
Start: 2018-06-28 | End: 2018-08-22 | Stop reason: SDUPTHER

## 2018-05-29 RX ORDER — CLONAZEPAM 1 MG/1
1 TABLET ORAL DAILY
Qty: 30 TABLET | Refills: 5 | Status: SHIPPED | OUTPATIENT
Start: 2018-05-29 | End: 2018-10-08 | Stop reason: SDUPTHER

## 2018-05-29 RX ORDER — DEXTROAMPHETAMINE SACCHARATE, AMPHETAMINE ASPARTATE, DEXTROAMPHETAMINE SULFATE AND AMPHETAMINE SULFATE 7.5; 7.5; 7.5; 7.5 MG/1; MG/1; MG/1; MG/1
30 TABLET ORAL 2 TIMES DAILY
Qty: 60 TABLET | Refills: 0 | Status: SHIPPED | OUTPATIENT
Start: 2018-07-27 | End: 2018-08-22 | Stop reason: SDUPTHER

## 2018-05-31 NOTE — PROGRESS NOTES
"Outpatient Psychiatry Follow-Up Visit (MD/NP)    5/29/2018    Clinical Status of Patient:  Outpatient (Ambulatory)    Chief Complaint:  Terri Summers is a 32 y.o. female who presents today for follow-up of anxiety and attention problems.  Met with patient.      Interval History and Content of Current Session:  Interim Events/Subjective Report/Content of Current Session: Patient was able to decrease klonopin dose to 1mg qHS only. She reports that she has been doing well with this, mood has been stable, denies clinically significant anxiety, reports adderall continues to be effective for ADHD symptoms. Discussed that I am leaving [facility] and gave her directions to schedule with another provider.     Review of Systems   Review of Systems   Constitutional: Negative for diaphoresis.   HENT: Negative for congestion.    Eyes: Negative for discharge.   Respiratory: Negative for apnea.    Cardiovascular: Negative for leg swelling.   Gastrointestinal: Negative for abdominal distention.   Musculoskeletal: Negative for gait problem.   Skin: Negative for color change.   Neurological: Negative for dizziness.   Psychiatric/Behavioral: Negative for suicidal ideas.     Past Medical, Family and Social History: The patient's past medical, family and social history have been reviewed and updated as appropriate within the electronic medical record - see encounter notes.    Compliance: yes    Side effects: None    Risk Parameters:  Patient reports no suicidal ideation  Patient reports no homicidal ideation  Patient reports no self-injurious behavior  Patient reports no violent behavior    Exam (detailed: at least 9 elements; comprehensive: all 15 elements)   Constitutional  Vitals:  Most recent vital signs, dated less than 90 days prior to this appointment, were reviewed.   Vitals:    05/29/18 0945   BP: 128/88   Pulse: 71   Weight: 75 kg (165 lb 3.8 oz)   Height: 5' 7" (1.702 m)        General:  unremarkable, age appropriate, normal " weight, casually dressed, neatly groomed     Musculoskeletal  Muscle Strength/Tone:  not examined   Gait & Station:  non-ataxic     Psychiatric  Speech:  no latency; no press   Mood & Affect:  steady  congruent and appropriate   Thought Process:  normal and logical   Associations:  intact   Thought Content:  normal, no suicidality, no homicidality, delusions, or paranoia   Insight:  intact   Judgement: behavior is adequate to circumstances   Orientation:  grossly intact   Memory: intact for content of interview   Language: grossly intact   Attention Span & Concentration:  able to focus   Fund of Knowledge:  intact and appropriate to age and level of education, not tested     Assessment and Diagnosis   Status/Progress: Based on the examination today, the patient's problem(s) is/are well controlled.  New problems have not been presented today.   Co-morbidities, Diagnostic uncertainty and Lack of compliance are not complicating management of the primary condition.  There are no active rule-out diagnoses for this patient at this time.     General Impression: 31-year-old woman initially presented for anxiety, depression, and ADHD, depression and anxiety were improving on effexor until patient stopped her medications for four days in 12/16 and was hospitalized, patient has been compliant with all her medications since then and reports her symptoms have improved, ADHD symptoms also well-controlled on medication. Anxiety symptoms not ideally controlled, we discussed increasing effexor to better manage this. Patient responding well to higher dose of effexor, we discussed starting to taper off klonopin. Patient doing well with this so far.      ICD-10-CM ICD-9-CM   1. ADHD (attention deficit hyperactivity disorder), combined type F90.2 314.01   2. Anxiety F41.9 300.00       Intervention/Counseling/Treatment Plan   · Continue effexor XR 225mg daily for anxiety.   · Continue klonopin 1mg qHS  · Continue adderall 30mg twice  daily and 20mg daily.    Return to Clinic: as scheduled  Time spent face-to-face with patient: 10 minutes, >50% spent in counseling

## 2018-08-22 ENCOUNTER — DOCUMENTATION ONLY (OUTPATIENT)
Dept: PHARMACY | Facility: CLINIC | Age: 33
End: 2018-08-22

## 2018-08-22 ENCOUNTER — TELEPHONE (OUTPATIENT)
Dept: OBSTETRICS AND GYNECOLOGY | Facility: CLINIC | Age: 33
End: 2018-08-22

## 2018-08-22 ENCOUNTER — OFFICE VISIT (OUTPATIENT)
Dept: OBSTETRICS AND GYNECOLOGY | Facility: CLINIC | Age: 33
End: 2018-08-22
Payer: MEDICAID

## 2018-08-22 VITALS — SYSTOLIC BLOOD PRESSURE: 102 MMHG | DIASTOLIC BLOOD PRESSURE: 76 MMHG | BODY MASS INDEX: 24.83 KG/M2 | WEIGHT: 158.5 LBS

## 2018-08-22 DIAGNOSIS — R35.0 URINARY FREQUENCY: Primary | ICD-10-CM

## 2018-08-22 LAB
BILIRUB SERPL-MCNC: ABNORMAL MG/DL
BLOOD URINE, POC: ABNORMAL
COLOR, POC UA: ABNORMAL
GLUCOSE UR QL STRIP: ABNORMAL
KETONES UR QL STRIP: ABNORMAL
LEUKOCYTE ESTERASE URINE, POC: ABNORMAL
NITRITE, POC UA: POSITIVE
PH, POC UA: ABNORMAL
PROTEIN, POC: ABNORMAL
SPECIFIC GRAVITY, POC UA: ABNORMAL
UROBILINOGEN, POC UA: ABNORMAL

## 2018-08-22 PROCEDURE — 81002 URINALYSIS NONAUTO W/O SCOPE: CPT | Mod: PBBFAC,PO | Performed by: OBSTETRICS & GYNECOLOGY

## 2018-08-22 PROCEDURE — 87086 URINE CULTURE/COLONY COUNT: CPT

## 2018-08-22 PROCEDURE — 99999 PR PBB SHADOW E&M-EST. PATIENT-LVL III: CPT | Mod: PBBFAC,,, | Performed by: OBSTETRICS & GYNECOLOGY

## 2018-08-22 PROCEDURE — 99213 OFFICE O/P EST LOW 20 MIN: CPT | Mod: PBBFAC,PO | Performed by: OBSTETRICS & GYNECOLOGY

## 2018-08-22 PROCEDURE — 99212 OFFICE O/P EST SF 10 MIN: CPT | Mod: S$PBB,,, | Performed by: OBSTETRICS & GYNECOLOGY

## 2018-08-22 RX ORDER — NITROFURANTOIN 25; 75 MG/1; MG/1
100 CAPSULE ORAL 2 TIMES DAILY
Qty: 14 CAPSULE | Refills: 0 | Status: SHIPPED | OUTPATIENT
Start: 2018-08-22 | End: 2018-08-29

## 2018-08-22 RX ORDER — NITROFURANTOIN 25; 75 MG/1; MG/1
100 CAPSULE ORAL 2 TIMES DAILY
Qty: 14 CAPSULE | Refills: 0 | Status: SHIPPED | OUTPATIENT
Start: 2018-08-22 | End: 2018-08-22 | Stop reason: SDUPTHER

## 2018-08-22 NOTE — TELEPHONE ENCOUNTER
----- Message from Reji Ortega sent at 8/22/2018  8:34 AM CDT -----  Contact: [phone]/self  Pt requesting to speak with you concerning being seen today for a UTI.  Pt had a scheduled appointment today for 8am but she missed it.   Please call

## 2018-08-22 NOTE — TELEPHONE ENCOUNTER
Returned pt's call, pt states she missed her appt today at 8am for a bladder infection and would like to still be seen today. Informed pt she would have to come in before 11am, pt states she has a job interview at 10:30am but will call them to see if she can go to the interview at a later time. Pt states for now to branden her as coming for this morning.

## 2018-08-22 NOTE — PROGRESS NOTES
GYNECOLOGY OFFICE NOTE    Reason for visit: urinary frequency     HPI: Pt is a 32 y.o.  female  who presents for evaluation of urinary frequency x 2 weeks. Taking AZO but not improving. Denies hematuria, back pain, no fever, chills. Had similar episode in  and urine culture that was negative. Pt unaware of an triggers. Feels as if she empties bladder completely.     Past Medical History:   Diagnosis Date    ADHD (attention deficit hyperactivity disorder)     Anxiety     History of psychiatric care     Psychiatric exam requested by authority     Psychiatric problem     Therapy        Past Surgical History:   Procedure Laterality Date     SECTION      DILATION AND CURETTAGE OF UTERUS  2008    missed ab       Family History   Problem Relation Age of Onset    ADD / ADHD Father        Social History     Tobacco Use    Smoking status: Current Every Day Smoker    Smokeless tobacco: Never Used   Substance Use Topics    Alcohol use: Yes     Comment: rare drink occasionally    Drug use: No     Comment: has abused THC, LSD, 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       OB History    Para Term  AB Living   1 1 1     2   SAB TAB Ectopic Multiple Live Births         1 2      # Outcome Date GA Lbr [place]/2nd Weight Sex Delivery Anes PTL Lv   1A Term 13    F CS-LTranv   BRENNAN   1B Term 13    F CS-LTranv   BRENNAN          Current Outpatient Medications   Medication Sig    clonazePAM (KLONOPIN) 1 MG tablet Take 1 tablet (1 mg total) by mouth once daily.    dextroamphetamine-amphetamine (ADDERALL) 20 mg tablet Take 1 tablet by mouth once daily.    dextroamphetamine-amphetamine (ADDERALL) 30 mg Tab Take 30 mg by mouth 2 (two) times daily.    venlafaxine (EFFEXOR-XR) 75 MG 24 hr capsule Take 3 capsules (225 mg total) by mouth once daily.    nitrofurantoin, macrocrystal-monohydrate, (MACROBID) 100 MG capsule Take 1 capsule (100 mg total) by mouth 2 (two) times daily. for 7 days     No current  facility-administered medications for this visit.        Allergies: Patient has no known allergies.     /76   Wt 71.9 kg (158 lb 8.2 oz)   LMP  (LMP Unknown)   BMI 24.83 kg/m²     ROS:  GENERAL: Denies fever or chills.   SKIN: Denies rash or lesions.   HEAD: Denies head injury or headache.   CHEST: Denies chest pain or shortness of breath.   CARDIOVASCULAR: Denies palpitations or chest pain.   ABDOMEN: No abdominal pain, constipation, diarrhea, nausea, vomiting or rectal bleeding.   URINARY: No dysuria, hematuria, or burning on urination.  REPRODUCTIVE: See HPI.   BREASTS: see HPI  NEUROLOGIC: Denies syncope or weakness.     Physical Exam:  GENERAL: alert, appears stated age and cooperative  NEUROLOGIC: orientated to person, place and time, normal mood and affect   CHEST: Normal respiratory effort  NECK: normal appearance, no thyromegaly masses or tenderness  SKIN: no acne, striae, hirsutism  Talk only    Diagnosis:  1. Urinary frequency        Plan:   1. Udip + nitrites, Rx macrobid sent along with culture. If continues will recurrent UTIs will consider referral to urology    Orders Placed This Encounter    Urine culture    POCT URINE DIPSTICK WITHOUT MICROSCOPE    nitrofurantoin, macrocrystal-monohydrate, (MACROBID) 100 MG capsule       Patient was counseled today on the new ACS guidelines for cervical cytology screening as well as the current recommendations for breast cancer screening. She was counseled to follow up with her PCP for other routine health maintenance.     Follow-up if symptoms worsen or fail to improve.      Carmen Yoder MD  OB/GYN  Pager: [phone]

## 2018-08-22 NOTE — PROGRESS NOTES
Patient picked up Macrobid free of charge. She is approved in our Nominal Pricing Program (Gold Card) enrollment effective thru 11/22/2018. Restrictions apply Retail Pharmacy must obtain ( cost transfer form)approval from Patient Assistance Technician before filling any additional prescriptions

## 2018-08-24 LAB — BACTERIA UR CULT: NO GROWTH

## 2018-08-28 ENCOUNTER — TELEPHONE (OUTPATIENT)
Dept: OBSTETRICS AND GYNECOLOGY | Facility: CLINIC | Age: 33
End: 2018-08-28

## 2018-08-28 DIAGNOSIS — R10.2 PELVIC PAIN IN FEMALE: Primary | ICD-10-CM

## 2018-08-28 NOTE — TELEPHONE ENCOUNTER
----- Message from Octavio Carbajal sent at 8/28/2018  8:00 AM CDT -----  Contact: self/[phone]  Patient called to advise that she has been taking the antibiotics for a week now with no improvement.    Please call and advise.

## 2018-08-28 NOTE — TELEPHONE ENCOUNTER
Her urine culture was negative. Lets get her scheduled for an U/S and I also placed a referral to urology if her U/S is normal. She should also establish care with a PCP. Order for U/S placed    Carmen Yoder MD, FACOG  OB/GYN  Pager: [phone]

## 2018-08-29 ENCOUNTER — TELEPHONE (OUTPATIENT)
Dept: OBSTETRICS AND GYNECOLOGY | Facility: CLINIC | Age: 33
End: 2018-08-29

## 2018-08-29 NOTE — TELEPHONE ENCOUNTER
Returned pt's call, pt states she is still not feeling any better after taking he bactrim. Inform pt Dr. Yoder would like her to have an U/S first, and then a referral to urology. Advised pt to also establish care with a pcp. Pt is scheduled for U/S 9/7 at 1pm, Patient verbalized understanding.

## 2018-08-29 NOTE — TELEPHONE ENCOUNTER
----- Message from Marlys Martinez sent at 8/28/2018  4:23 PM CDT -----  Contact: self / [phone]  Patient is requesting a call back regarding,  Still has not seen a change since been on antibiotics. Do want to prescribe her something else? are have her come in. Please advise

## 2018-09-07 ENCOUNTER — PROCEDURE VISIT (OUTPATIENT)
Dept: OBSTETRICS AND GYNECOLOGY | Facility: CLINIC | Age: 33
End: 2018-09-07
Payer: MEDICAID

## 2018-09-07 DIAGNOSIS — R10.2 PELVIC PAIN IN FEMALE: ICD-10-CM

## 2018-09-07 PROCEDURE — 76830 TRANSVAGINAL US NON-OB: CPT | Mod: 26,S$PBB,, | Performed by: OBSTETRICS & GYNECOLOGY

## 2018-09-07 PROCEDURE — 76830 TRANSVAGINAL US NON-OB: CPT | Mod: PBBFAC,PO

## 2018-10-08 ENCOUNTER — OFFICE VISIT (OUTPATIENT)
Dept: PSYCHIATRY | Facility: CLINIC | Age: 33
End: 2018-10-08
Payer: MEDICAID

## 2018-10-08 VITALS
WEIGHT: 190.81 LBS | BODY MASS INDEX: 30.67 KG/M2 | SYSTOLIC BLOOD PRESSURE: 121 MMHG | HEIGHT: 66 IN | HEART RATE: 95 BPM | DIASTOLIC BLOOD PRESSURE: 70 MMHG

## 2018-10-08 DIAGNOSIS — F90.2 ADHD (ATTENTION DEFICIT HYPERACTIVITY DISORDER), COMBINED TYPE: ICD-10-CM

## 2018-10-08 DIAGNOSIS — F41.9 ANXIETY: Primary | ICD-10-CM

## 2018-10-08 PROCEDURE — 99999 PR PBB SHADOW E&M-EST. PATIENT-LVL II: CPT | Mod: PBBFAC,,, | Performed by: NURSE PRACTITIONER

## 2018-10-08 PROCEDURE — 99213 OFFICE O/P EST LOW 20 MIN: CPT | Mod: SA,HB,S$PBB, | Performed by: NURSE PRACTITIONER

## 2018-10-08 PROCEDURE — 99212 OFFICE O/P EST SF 10 MIN: CPT | Mod: PBBFAC | Performed by: NURSE PRACTITIONER

## 2018-10-08 RX ORDER — VENLAFAXINE HYDROCHLORIDE 75 MG/1
225 CAPSULE, EXTENDED RELEASE ORAL DAILY
Qty: 30 CAPSULE | Refills: 2 | Status: SHIPPED | OUTPATIENT
Start: 2018-10-08 | End: 2018-10-25

## 2018-10-08 RX ORDER — DEXTROAMPHETAMINE SACCHARATE, AMPHETAMINE ASPARTATE, DEXTROAMPHETAMINE SULFATE AND AMPHETAMINE SULFATE 5; 5; 5; 5 MG/1; MG/1; MG/1; MG/1
1 TABLET ORAL 2 TIMES DAILY
Qty: 30 TABLET | Refills: 0 | Status: SHIPPED | OUTPATIENT
Start: 2018-10-08 | End: 2018-11-08

## 2018-10-08 RX ORDER — CLONAZEPAM 1 MG/1
1 TABLET ORAL DAILY
Qty: 30 TABLET | Refills: 2 | Status: SHIPPED | OUTPATIENT
Start: 2018-10-08 | End: 2019-07-01

## 2018-10-08 NOTE — PROGRESS NOTES
"Outpatient Psychiatry Follow-Up Visit (MD/NP)    10/8/2018    Clinical Status of Patient:  Outpatient (Ambulatory)    Chief Complaint:  Terri Summers is a 33 y.o. female who presents today for follow-up of anxiety and attention problems.  Met with patient and her twin daughters.  Patient was last seen by Miranda Cooper NP and was prescribed effexor XR 225mg daily for anxiety, klonopin 1mg qHS and adderall 30mg twice daily and 20mg daily    Interval History and Content of Current Session:  Interim Events/Subjective Report/Content of Current Session: Patient is 33 y.o female who works as a  for an insurance agency. Patient stated that she was diagnosed with ADHD at age 14 by an [facility] psychiatrist/psychologist and has been using adderall for 18 years. Patient stated that she is taking adderall 30mg IR po BID and 20 mg po daily  (first dose at 5:00 qam, second dose at 10:00am and third dose of addreall 30mg at 4:00 pm). She stated that adderall is controlling her ADHD symptoms and she has the ability  to complete her tasks, pay attention at work and stay awake. Stated that her anxiety is controlled with klonopin 1 mg po daily, PRN as needed (takes 1/2 in am and 1/2 pm) and with venlafaxine 225mg pod daily.  Stated that she has not had a panic attack in a while, and her last panic attack was 2 years ago. Denied feeling depressed and stated,"I am in a better place and know when my anxiety is overwhelming. I have developed better coping skill".  Stated she continues to take venlafaxine 225mg po daily.  Stated that she rarely drinks alcohol and denied any type of illicit drug use. Stated that she gets about 5-7 hours of restful sleep per night. Stated that she gained weight about 15 lbs in the past 6 months due to bad eating habits and eating late at night. Denied SI/HI/AH/VH and no delusion. No seizures and no lisa symptoms. Informed patient that her addrall dosage exceeds the FDA guidelines and I do not " feel comfortable prescribing Adderall 80mg/day PO without a plan of decreasing her adderall dosage and taking drug holiday days. Also discussed my plan of tapering patient gradually of BZD and how BZD are used for short tem treatment. Patient did not agree with my treatment plan and asked if I could give her adderall 20 mg po daily for one month until she finds another provider who will prescribe her adderall 80 mg po daily. Patient was given one prescription of adderall 20mg po daily and prescription of clonopin 1mg qhs. Informed patient of benefits, risks, alternatives of her medications and patient verbalized her understanding.     On 10/14/18  I checked patient's file and realized that patient's prescription was written for adderall 20 mg po BID and dispense # 30 tabs instead of # 60 tabs. I called patient's pharmacy and the staff instructed me to write another prescription of adderall 20mg IM for # 30 tab to make for the other 30 tabs that she did not get.      Review of Systems   · PSYCHIATRIC: Pertinant items are noted in the narrative.  · CONSTITUTIONAL: No weight gain or loss.   · MUSCULOSKELETAL: No pain or stiffness of the joints.  · NEUROLOGIC: No weakness, sensory changes, seizures, confusion, memory loss, tremor or other abnormal movements.  · ENDOCRINE: No polydipsia or polyuria.  · INTEGUMENTARY: No rashes or lacerations.  · EYES: No exophthalmos, jaundice or blindness.  · ENT: No dizziness, tinnitus or hearing loss.  · RESPIRATORY: No shortness of breath.  · CARDIOVASCULAR: No tachycardia or chest pain.  · GASTROINTESTINAL: No nausea, vomiting, pain, constipation or diarrhea.  · GENITOURINARY: No frequency, dysuria or sexual dysfunction.  · HEMATOLOGIC/LYMPHATIC: No excessive bleeding, prolonged or excessive bleeding after dental extraction/injury.  · ALLERGIC/IMMUNOLOGIC: No allergic response to materials, foods or animals at this time.    Past Medical, Family and Social History: The patient's past  "medical, family and social history have been reviewed and updated as appropriate within the electronic medical record - see encounter notes.    Compliance: yes    Side effects: None    Risk Parameters:  Patient reports no suicidal ideation  Patient reports no homicidal ideation  Patient reports no self-injurious behavior  Patient reports no violent behavior    Exam (detailed: at least 9 elements; comprehensive: all 15 elements)   Constitutional  Vitals:  Most recent vital signs, dated greater than 90 days prior to this appointment, were reviewed.   Vitals:    10/08/18 1313   BP: 121/70   Pulse: 95   Weight: 86.5 kg (190 lb 12.9 oz)   Height: 5' 6" (1.676 m)        General:  unremarkable, age appropriate     Musculoskeletal  Muscle Strength/Tone:  no dystonia, no tremor, no tic   Gait & Station:  non-ataxic     Psychiatric  Speech:  no latency; no press   Mood & Affect:  "good"  congruent and appropriate   Thought Process:  normal and logical   Associations:  intact   Thought Content:  normal, no suicidality, no homicidality, delusions, or paranoia   Insight:  intact   Judgement: good   Orientation:  grossly intact   Memory: intact for content of interview   Language: grossly intact   Attention Span & Concentration:  able to focus   Fund of Knowledge:  intact and appropriate to age and level of education     Assessment and Diagnosis   Status/Progress: Based on the examination today, the patient's problem(s) is/are well controlled.  New problems have not been presented today.   Co-morbidities are not complicating management of the primary condition.  There are no active rule-out diagnoses for this patient at this time.     General Impression:       ICD-10-CM ICD-9-CM   1. ADHD (attention deficit hyperactivity disorder), inattentive type F90.0 314.00   2. Anxiety F41.9 300.00   3. BRITANY (generalized anxiety disorder) F41.1 300.02       Intervention/Counseling/Treatment Plan   · Medication Management: Continue current " medications. The risks and benefits of medication were discussed with the patient.   · Continue effexor XR 225mg daily for anxiety.   · Continue klonopin 1mg qHS  · Informed pt of the risks of continuous Benzodiazepine use including tolerance, dependence, dizziness, drowsiness, memory problems, and withdrawals that may be life threatening upon abrupt cessation. Also advised not to take Benzodiazepines with Opiates or other sedatives and also not to drive or operate heavy machinery while using Benzodiazepines.  · Decrease adderall to 20 mg IR twice daily. Checked  and no signs of misuse. Prescription of adderall 20 mg po BID was sent to patient's pharmacy.  -Discussed diagnosis, risks and benefits of proposed treatment above vs alternative treatments vs no treatment, and potential side effects of these treatments. The patient expresses understanding of the above and displays the capacity to agree with this treatment given said understanding. Patient also agrees that, currently, the benefits outweigh the risks and would like to pursue treatment at this time.   Patient wants to keep the same dosage of adderall 80mg/day and since I was only comfortable prescribing adderall 20mg po BID max, the patient stated that she will be pursing another provider that will prescribe her the adderal 80mg/day.    -Encouraged Patient to keep future appointments  -Take medications as prescribed and abstain from substance abuse.  -Pt to present to ED for thoughts to harm herself or others           Return to Clinic: 3 months

## 2018-10-14 RX ORDER — DEXTROAMPHETAMINE SACCHARATE, AMPHETAMINE ASPARTATE, DEXTROAMPHETAMINE SULFATE AND AMPHETAMINE SULFATE 5; 5; 5; 5 MG/1; MG/1; MG/1; MG/1
1 TABLET ORAL DAILY
Qty: 30 TABLET | Refills: 0 | Status: SHIPPED | OUTPATIENT
Start: 2018-10-14 | End: 2018-11-13

## 2018-10-15 ENCOUNTER — TELEPHONE (OUTPATIENT)
Dept: PSYCHIATRY | Facility: CLINIC | Age: 33
End: 2018-10-15

## 2018-10-15 ENCOUNTER — PATIENT MESSAGE (OUTPATIENT)
Dept: PSYCHIATRY | Facility: CLINIC | Age: 33
End: 2018-10-15

## 2018-10-15 NOTE — TELEPHONE ENCOUNTER
----- Message from Jimmy Giraldo MA sent at 10/15/2018 12:44 PM CDT -----  Contact: Self   The above pt called and stated, she need a new script for RX dextroamphetamine-amphetamine (ADDERALL) 20 mg tablet, stated that you only give fifteen days supply and not 30, please advice    Thanks      On 10/14/18 A prescription of adderall 20mg IR for # 30 tab was sent to the patient's pharmacy to cover the patient for the additional 15 days.    Thanks,    Malaika

## 2018-10-16 ENCOUNTER — PATIENT MESSAGE (OUTPATIENT)
Dept: PSYCHIATRY | Facility: CLINIC | Age: 33
End: 2018-10-16

## 2018-10-19 NOTE — TELEPHONE ENCOUNTER
Prescription was sent to patient's pharmacy. Spoke with the patient and she received her adderall 20 mg po BID and total of # 60 tabs.    DOMINIC Dai, [id number]-BC

## 2018-10-22 ENCOUNTER — TELEPHONE (OUTPATIENT)
Dept: OBSTETRICS AND GYNECOLOGY | Facility: CLINIC | Age: 33
End: 2018-10-22

## 2018-10-22 DIAGNOSIS — N32.89 BLADDER IRRITATION: Primary | ICD-10-CM

## 2018-10-22 NOTE — TELEPHONE ENCOUNTER
----- Message from Reji Ortega sent at 10/22/2018  9:43 AM CDT -----  Contact: [phone]/self  Patient states she's returning your call   Please call and advise

## 2018-10-22 NOTE — TELEPHONE ENCOUNTER
Returned pt's call, pt states she needs a prescription for a bladder infection. Informed pt after the rx was sent in on 8/29 and the symptoms persist she would need to have an U/S and then a referral to urology if normal. U/S was normal, advised pt to call to schedule with urology, she states she will but need something called in now due to discomfort.

## 2018-10-22 NOTE — TELEPHONE ENCOUNTER
----- Message from Kristine Villar sent at 10/22/2018  9:09 AM CDT -----  Contact: [phone]/ self   Pt its [phone]. Please advise

## 2018-10-25 ENCOUNTER — OFFICE VISIT (OUTPATIENT)
Dept: UROLOGY | Facility: CLINIC | Age: 33
End: 2018-10-25
Payer: MEDICAID

## 2018-10-25 VITALS
HEIGHT: 66 IN | SYSTOLIC BLOOD PRESSURE: 124 MMHG | BODY MASS INDEX: 30.11 KG/M2 | HEART RATE: 115 BPM | WEIGHT: 187.38 LBS | DIASTOLIC BLOOD PRESSURE: 89 MMHG

## 2018-10-25 DIAGNOSIS — R30.0 DYSURIA: Primary | ICD-10-CM

## 2018-10-25 PROCEDURE — 99213 OFFICE O/P EST LOW 20 MIN: CPT | Mod: PBBFAC | Performed by: UROLOGY

## 2018-10-25 PROCEDURE — 99999 PR PBB SHADOW E&M-EST. PATIENT-LVL III: CPT | Mod: PBBFAC,,, | Performed by: UROLOGY

## 2018-10-25 PROCEDURE — 99204 OFFICE O/P NEW MOD 45 MIN: CPT | Mod: S$PBB,,, | Performed by: UROLOGY

## 2018-10-25 RX ORDER — NITROFURANTOIN MACROCRYSTALS 50 MG/1
50 CAPSULE ORAL NIGHTLY
Qty: 30 CAPSULE | Refills: 6 | Status: SHIPPED | OUTPATIENT
Start: 2018-10-25 | End: 2019-07-01

## 2018-10-25 RX ORDER — PHENAZOPYRIDINE HYDROCHLORIDE 95 MG/1
95 TABLET ORAL 3 TIMES DAILY PRN
COMMUNITY
End: 2019-07-01

## 2018-10-25 NOTE — PROGRESS NOTES
Subjective:       Patient ID: Terri Summers is a 33 y.o. female.    Chief Complaint: Advice Only (recurrent uti, pt state she bladder on today is very  painful, she take azo daily,  pain scale on today is #8 , she have sharp  pain. )    HPI    Terri Summers is a 33 y.o. female here for evaluation and discussion of recurrent UTI. She had a negative urine culture on 18. She takes azo standard daily. Notes sharp bladder pain, which is about 8/10 on a pain scale. Denies having a renal US or cystoscope performed. She was not placed on low dose antibiotics and does not take probiotics. Bladder pain not associated with intercourse.     Past Medical History:   Diagnosis Date    ADHD (attention deficit hyperactivity disorder)     Anxiety     History of psychiatric care     Psychiatric exam requested by authority     Psychiatric problem     Therapy        Past Surgical History:   Procedure Laterality Date     SECTION      DILATION AND CURETTAGE OF UTERUS      missed ab       Family History   Problem Relation Age of Onset    ADD / ADHD Father        Social History     Socioeconomic History    Marital status: Single     Spouse name: Not on file    Number of children: Not on file    Years of education: Not on file    Highest education level: Not on file   Social Needs    Financial resource strain: Not on file    Food insecurity - worry: Not on file    Food insecurity - inability: Not on file    Transportation needs - medical: Not on file    Transportation needs - non-medical: Not on file   Occupational History    Not on file   Tobacco Use    Smoking status: Current Every Day Smoker    Smokeless tobacco: Never Used   Substance and Sexual Activity    Alcohol use: Yes     Comment: rare drink occasionally    Drug use: No     Comment: has abused THC, LSD, 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 Patient is employed as an . Denies access to a firearm.        Allergies:  Patient has no known allergies.    Medications:    Current Outpatient Medications:     clonazePAM (KLONOPIN) 1 MG tablet, Take 1 tablet (1 mg total) by mouth once daily., Disp: 30 tablet, Rfl: 2    dextroamphetamine-amphetamine (ADDERALL) 20 mg tablet, Take 1 tablet by mouth 2 (two) times daily., Disp: 30 tablet, Rfl: 0    dextroamphetamine-amphetamine (ADDERALL) 20 mg tablet, Take 1 tablet by mouth once daily., Disp: 30 tablet, Rfl: 0    dextroamphetamine-amphetamine (ADDERALL) 30 mg Tab, Take 30 mg by mouth 2 (two) times daily., Disp: 60 tablet, Rfl: 0    phenazopyridine (PYRIDIUM) 95 MG tablet, Take 95 mg by mouth 3 (three) times daily as needed for Pain., Disp: , Rfl:     nitrofurantoin (MACRODANTIN) 50 MG capsule, Take 1 capsule (50 mg total) by mouth every evening., Disp: 30 capsule, Rfl: 6    Review of Systems   Constitutional: Negative for activity change, appetite change, chills, diaphoresis, fatigue, fever and unexpected weight change.   HENT: Negative for congestion, dental problem, hearing loss, mouth sores, postnasal drip, rhinorrhea, sinus pressure and trouble swallowing.    Eyes: Negative for pain, discharge and itching.   Respiratory: Negative for apnea, cough, choking, chest tightness, shortness of breath and wheezing.    Cardiovascular: Negative for chest pain, palpitations and leg swelling.   Gastrointestinal: Negative for abdominal distention, abdominal pain, anal bleeding, blood in stool, constipation, diarrhea, nausea, rectal pain and vomiting.   Endocrine: Negative for polydipsia and polyuria.   Genitourinary: Negative for decreased urine volume, difficulty urinating, dyspareunia, dysuria, enuresis, flank pain, frequency, genital sores, hematuria, menstrual problem, pelvic pain and  urgency.        Sharp bladder pain.   Musculoskeletal: Negative for arthralgias, back pain and myalgias.   Skin: Negative for color change, rash and wound.   Neurological: Negative for dizziness, syncope, speech difficulty, light-headedness and headaches.   Hematological: Negative for adenopathy. Does not bruise/bleed easily.   Psychiatric/Behavioral: Negative for behavioral problems, confusion and sleep disturbance.       Objective:      Physical Exam   Constitutional: She appears well-developed.   HENT:   Head: Normocephalic.   Neck: Neck supple.   Cardiovascular: Normal rate.    Pulmonary/Chest: Effort normal.   Abdominal: Soft.   Neurological: She is alert.   Skin: Skin is warm.     Psychiatric: She has a normal mood and affect.       Assessment:       1. Dysuria        Plan:       Terri was seen today for advice only.    Diagnoses and all orders for this visit:    Dysuria  -     US Retroperitoneal Complete (Kidney and; Future  -     Cystoscopy; Future    Other orders  -     nitrofurantoin (MACRODANTIN) 50 MG capsule; Take 1 capsule (50 mg total) by mouth every evening.          Continue azo standard.  Start low dose antibiotics 3 nights/week.  Start probiotics.  Stay well hydrated.    IHugo, am acting as a scribe on this patient encounter in the presence and under the supervision of Dr. Soriano.    10/25/2018 7:46 AM    I, Dr. Soriano, personally performed the services described in this documentation.   All medical record entries made by the scribe were at my direction and in my presence.   I have reviewed the chart and agree that the record is accurate and complete.   Jose Daniel Soriano MD.  8:00 AM 10/25/2018

## 2018-10-25 NOTE — LETTER
October 25, 2018      Carmen Yoder MD  [address]  [address]  [facility] [place] [address]           [facility] - Urology 4th Floor  [address]  [place] [address]  Phone: [phone]          Patient: Terri Summers   MR Number: [id number]   YOB: 1985   Date of Visit: 10/25/2018       Dear Dr. Carmen Yoder:    Thank you for referring Terri Summers to me for evaluation. Attached you will find relevant portions of my assessment and plan of care.    If you have questions, please do not hesitate to call me. I look forward to following Terri Summers along with you.    Sincerely,    Jose Daniel Soriano Jr., MD    Enclosure  CC:  No Recipients    If you would like to receive this communication electronically, please contact [email or web address] or [phone] to request more information on [email or web address] Link access.    For providers and/or their staff who would like to refer a patient to [facility], please contact us through our one-stop-shop provider referral line, [facility], at [phone].    If you feel you have received this communication in error or would no longer like to receive these types of communications, please e-mail [email or web address]

## 2018-11-05 ENCOUNTER — HOSPITAL ENCOUNTER (OUTPATIENT)
Dept: RADIOLOGY | Facility: HOSPITAL | Age: 33
Discharge: HOME | End: 2018-11-05
Attending: UROLOGY
Payer: MEDICAID

## 2018-11-05 ENCOUNTER — HOSPITAL ENCOUNTER (OUTPATIENT)
Dept: UROLOGY | Facility: HOSPITAL | Age: 33
Discharge: HOME | End: 2018-11-05
Attending: UROLOGY
Payer: MEDICAID

## 2018-11-05 VITALS
WEIGHT: 193.56 LBS | DIASTOLIC BLOOD PRESSURE: 73 MMHG | HEART RATE: 77 BPM | BODY MASS INDEX: 31.11 KG/M2 | TEMPERATURE: 99 F | SYSTOLIC BLOOD PRESSURE: 119 MMHG | RESPIRATION RATE: 18 BRPM | HEIGHT: 66 IN

## 2018-11-05 DIAGNOSIS — R30.0 DYSURIA: ICD-10-CM

## 2018-11-05 PROCEDURE — 76770 US EXAM ABDO BACK WALL COMP: CPT | Mod: TC

## 2018-11-05 PROCEDURE — 52000 CYSTOURETHROSCOPY: CPT | Mod: ,,, | Performed by: UROLOGY

## 2018-11-05 PROCEDURE — 76770 US EXAM ABDO BACK WALL COMP: CPT | Mod: 26,,, | Performed by: INTERNAL MEDICINE

## 2018-11-05 PROCEDURE — 52000 CYSTOURETHROSCOPY: CPT

## 2018-11-05 RX ORDER — LIDOCAINE HYDROCHLORIDE 20 MG/ML
JELLY TOPICAL ONCE
Status: COMPLETED | OUTPATIENT
Start: 2018-11-05 | End: 2018-11-05

## 2018-11-05 RX ORDER — CIPROFLOXACIN 500 MG/1
500 TABLET ORAL ONCE
Status: DISCONTINUED | OUTPATIENT
Start: 2018-11-05 | End: 2019-10-18

## 2018-11-05 RX ADMIN — LIDOCAINE HYDROCHLORIDE: 20 JELLY TOPICAL at 02:11

## 2018-11-05 NOTE — OP NOTE
DATE OF PROCEDURE:  11/05/2018.    PREOPERATIVE DIAGNOSIS:  Dysuria.    POSTOPERATIVE DIAGNOSES:  Dysuria plus recurrent UTI.    OPERATION PERFORMED:  Flexible cystoscopy.    PROCEDURE IN DETAIL:  The patient was prepped and draped in dorsal lithotomy   position.  Xylocaine jelly was instilled per urethra.  Urethra was normal.  No   diverticula were noted.  Bladder neck was normal.  Both the orifices were normal   in size, shape, and position with clear efflux.  There were no stones, tumors,   foreign bodies, or active infections noted.  There was some redness, indicative   of a recent infection.  Urinalysis today was clear.    IMPRESSION:  Recurrent UTI.    RECOMMENDATIONS:  The patient has been taking Macrodantin every night and she   feels much better.  I am going to continue her on Macrodantin suppression 50 mg   at bedtime for one month and then switch her to Monday, Wednesday, and Friday   for two months and then see her back in three months and try and wean her off of   Macrodantin.  The patient will take probiotics and drink plenty of fluids and   call me should she develop any problems.  She will also use Azo-Standard p.r.n.      NEVA/TISH  dd: 11/05/2018 14:43:36 (CST)  td: 11/05/2018 15:42:02 (CST)  Doc ID   #[id number]  Job ID #[id number]    CC:

## 2018-11-05 NOTE — PATIENT INSTRUCTIONS
What to Expect After a Cystoscopy  For the next 24-48 hours, you may feel a mild burning when you urinate. This burning is normal and expected. Drink plenty of water to dilute the urine to help relieve the burning sensation. You may also see a small amount of blood in your urine after the procedure.    Unless you are already taking antibiotics, you may be given an antibiotic after the test to prevent infection.    Signs and Symptoms to Report  Call the [facility] at [phone] if you develop any of the following:  · Fever of 101 degrees or higher  · Chills or persistent bleeding  · Inability to urinate

## 2019-04-22 ENCOUNTER — TELEPHONE (OUTPATIENT)
Dept: PSYCHIATRY | Facility: CLINIC | Age: 34
End: 2019-04-22

## 2019-04-22 NOTE — TELEPHONE ENCOUNTER
Spoke with patient and she stated that she needs a letter for the district court. She stated that she had an issue with her stepmother at her father's house that ended up in court. She stated that she cancelled he appointment with me today and scheduled another appointment in June. She  asking if I could see her earlier to go over more details of what she needs. Informed patient that she can request to be on a cancellation list for it. Also informed patient that I only saw her once and she has been seeing other provider since her last visit with me.

## 2019-04-22 NOTE — TELEPHONE ENCOUNTER
----- Message from Rogelio Turner sent at 4/22/2019  3:25 PM CDT -----  Contact: Patient  Pt canceled her 4:30pm appointment for today. Pt ask if you can please give her a call it's very important that she speaks to you.

## 2019-07-01 ENCOUNTER — HOSPITAL ENCOUNTER (OUTPATIENT)
Dept: RADIOLOGY | Facility: HOSPITAL | Age: 34
Discharge: HOME | End: 2019-07-01
Attending: OBSTETRICS & GYNECOLOGY
Payer: MEDICAID

## 2019-07-01 ENCOUNTER — OFFICE VISIT (OUTPATIENT)
Dept: OBSTETRICS AND GYNECOLOGY | Facility: CLINIC | Age: 34
End: 2019-07-01
Payer: MEDICAID

## 2019-07-01 VITALS — BODY MASS INDEX: 30.42 KG/M2 | WEIGHT: 188.5 LBS | DIASTOLIC BLOOD PRESSURE: 76 MMHG | SYSTOLIC BLOOD PRESSURE: 110 MMHG

## 2019-07-01 DIAGNOSIS — Z12.4 SCREENING FOR CERVICAL CANCER: ICD-10-CM

## 2019-07-01 DIAGNOSIS — T83.32XA INTRAUTERINE CONTRACEPTIVE DEVICE THREADS LOST, INITIAL ENCOUNTER: ICD-10-CM

## 2019-07-01 DIAGNOSIS — Z01.419 WELL WOMAN EXAM WITH ROUTINE GYNECOLOGICAL EXAM: Primary | ICD-10-CM

## 2019-07-01 PROCEDURE — 99395 PREV VISIT EST AGE 18-39: CPT | Mod: S$PBB,,, | Performed by: OBSTETRICS & GYNECOLOGY

## 2019-07-01 PROCEDURE — 88175 CYTOPATH C/V AUTO FLUID REDO: CPT

## 2019-07-01 PROCEDURE — 87624 HPV HI-RISK TYP POOLED RSLT: CPT

## 2019-07-01 PROCEDURE — 99999 PR PBB SHADOW E&M-EST. PATIENT-LVL III: CPT | Mod: PBBFAC,,, | Performed by: OBSTETRICS & GYNECOLOGY

## 2019-07-01 PROCEDURE — 76830 TRANSVAGINAL US NON-OB: CPT | Mod: 26,,, | Performed by: RADIOLOGY

## 2019-07-01 PROCEDURE — 76830 TRANSVAGINAL US NON-OB: CPT | Mod: TC

## 2019-07-01 PROCEDURE — 76856 US EXAM PELVIC COMPLETE: CPT | Mod: 26,,, | Performed by: RADIOLOGY

## 2019-07-01 PROCEDURE — 99213 OFFICE O/P EST LOW 20 MIN: CPT | Mod: PBBFAC,PO | Performed by: OBSTETRICS & GYNECOLOGY

## 2019-07-01 PROCEDURE — 99395 PR PREVENTIVE VISIT,EST,18-39: ICD-10-PCS | Mod: S$PBB,,, | Performed by: OBSTETRICS & GYNECOLOGY

## 2019-07-01 PROCEDURE — 76830 US PELVIS COMP WITH TRANSVAG NON-OB (XPD): ICD-10-PCS | Mod: 26,,, | Performed by: RADIOLOGY

## 2019-07-01 PROCEDURE — 76856 US PELVIS COMP WITH TRANSVAG NON-OB (XPD): ICD-10-PCS | Mod: 26,,, | Performed by: RADIOLOGY

## 2019-07-01 PROCEDURE — 99999 PR PBB SHADOW E&M-EST. PATIENT-LVL III: ICD-10-PCS | Mod: PBBFAC,,, | Performed by: OBSTETRICS & GYNECOLOGY

## 2019-07-01 NOTE — PROGRESS NOTES
GYNECOLOGY OFFICE NOTE    Reason for visit: annual    HPI: Pt is a 33 y.o.  female  who presents for annual. Cycle: menarche- 16, Interval- none with mirena inserted 17.  She is sexually active.   She does not desire STI screening. She denies vaginal discharge.  Last pap: 3/2016, denies hx of abnormal.     Past Medical History:   Diagnosis Date    ADHD (attention deficit hyperactivity disorder)     Anxiety     History of psychiatric care     Psychiatric exam requested by authority     Psychiatric problem     Therapy        Past Surgical History:   Procedure Laterality Date     SECTION      DILATION AND CURETTAGE OF UTERUS      missed ab       Family History   Problem Relation Age of Onset    ADD / ADHD Father        Social History     Tobacco Use    Smoking status: Current Every Day Smoker    Smokeless tobacco: Never Used   Substance Use Topics    Alcohol use: Yes     Comment: rare drink occasionally    Drug use: No     Comment: has abused THC, LSD, 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     Current Facility-Administered Medications   Medication    ciprofloxacin HCl tablet 500 mg       Allergies: Patient has no known allergies.     /76   Wt 85.5 kg (188 lb 7.9 oz)   LMP  (LMP Unknown)   BMI 30.42 kg/m²     ROS:  GENERAL: Denies fever or chills.   SKIN: Denies rash or lesions.   HEAD: Denies head injury or headache.   CHEST: Denies chest pain or shortness of breath.   CARDIOVASCULAR: Denies palpitations or chest pain.   ABDOMEN: No constipation, diarrhea, nausea,  vomiting or rectal bleeding.   URINARY: No dysuria, hematuria, or burning on urination.  REPRODUCTIVE: See HPI.   BREASTS: see HPI  NEUROLOGIC: Denies syncope or weakness.     Physical Exam:  GENERAL: alert, appears stated age and cooperative  NEUROLOGIC: orientated to person, place and time, normal mood and affect   CHEST: Normal respiratory effort  NECK: normal appearance  SKIN: no acne, hirsutism  BREAST EXAM: breasts appear normal, no suspicious masses, no skin or nipple changes or axillary nodes  ABDOMEN: abdomen is soft without significant tenderness, masses  EXTERNAL GENITALIA:  normal general appearance  URETHRA: normal urethra, normal urethral meatus  VAGINA:  normal without tenderness, induration or masses  CERVIX:  Normal IUD strings not visualized  UTERUS:  mobile, non-tender  ADNEXA:  normal adnexa, nontender and no masses    Diagnosis:  1. Well woman exam with routine gynecological exam    2. Screening for cervical cancer    3. Intrauterine contraceptive device threads lost, initial encounter        Plan:   1. Annual  2. Pap/hpv  3. U/S to confirm IUD placement. Considering conceiving after achieving weight loss goals    Orders Placed This Encounter    HPV High Risk Genotypes, PCR    US Pelvis Comp with Transvag NON-OB (xpd    Liquid-based pap smear, screening       Patient was counseled today on the new ACS guidelines for cervical cytology screening as well as the current recommendations for breast cancer screening. She was counseled to follow up with her PCP for other routine health maintenance.     Follow up for after u/s.      Carmen Yoder MD  OB/GYN  Pager: [phone]

## 2019-07-08 LAB
HPV HR 12 DNA CVX QL NAA+PROBE: NEGATIVE
HPV16 AG SPEC QL: NEGATIVE
HPV18 DNA SPEC QL NAA+PROBE: NEGATIVE

## 2019-10-18 ENCOUNTER — TELEPHONE (OUTPATIENT)
Dept: OBSTETRICS AND GYNECOLOGY | Facility: CLINIC | Age: 34
End: 2019-10-18

## 2019-10-18 NOTE — TELEPHONE ENCOUNTER
----- Message from Reji Ortega sent at 10/18/2019  8:16 AM CDT -----  Contact: [phone]/self  Patient requesting to speak with you about having a bladder infection  Please call back to assist at [phone]

## 2019-10-18 NOTE — TELEPHONE ENCOUNTER
Contacted pt and inform Bactrim is actually used to treat UTIs, advised pt she may need to reach out to her urologist if she is having issues even while on antibiotics. Patient verbalized understanding.

## 2019-10-18 NOTE — TELEPHONE ENCOUNTER
Returned call, pt states she is on bactrim currently for an ingrown toenail removal and thinks she may have a bladder infection, experiencing urinary urgency/frequency and dysuria. Pt was advised to see Urology last October, she states all they did was put her on stronger antibiotics which helped.

## 2019-10-18 NOTE — TELEPHONE ENCOUNTER
Bactrim is actually used to treat UTIs. She may need to reach out to her urologist if she is having issues even while on antibiotics    Carmen Yoder MD, FACOG  OB/GYN  Pager: [phone]

## 2019-10-21 ENCOUNTER — TELEPHONE (OUTPATIENT)
Dept: UROLOGY | Facility: CLINIC | Age: 34
End: 2019-10-21

## 2019-10-21 NOTE — TELEPHONE ENCOUNTER
----- Message from Vicky Gamez LPN sent at 10/18/2019  3:01 PM CDT -----  Contact: Terri  tel:    [phone]    Advised pt to try azo and schedule an office visit as she has not seen dr. villeda in almost a year. I was going to offer an benito next week, but she has medicaid   and would like to see dr villeda  at [facility]. Please call her Monday to schedule an benito't   ----- Message -----  From: Regina Beyer  Sent: 10/18/2019   2:34 PM CDT  To: Christie RIOS Jr Staff    Caller says she has a UTI and is on Bactrum   800/160 mgs . ,already due to she had some toe nails removed.   Sypmtoms:   Urgency, burning when Urinating, for 4 days.   Pharm[facility] is : [facility].   Pcp told her to call you.    Pls call today.

## 2019-10-24 ENCOUNTER — OFFICE VISIT (OUTPATIENT)
Dept: UROLOGY | Facility: CLINIC | Age: 34
End: 2019-10-24
Payer: MEDICAID

## 2019-10-24 VITALS
SYSTOLIC BLOOD PRESSURE: 138 MMHG | WEIGHT: 188.5 LBS | HEIGHT: 66 IN | HEART RATE: 112 BPM | BODY MASS INDEX: 30.29 KG/M2 | DIASTOLIC BLOOD PRESSURE: 91 MMHG

## 2019-10-24 DIAGNOSIS — R30.0 DYSURIA: ICD-10-CM

## 2019-10-24 DIAGNOSIS — R39.89 BLADDER PAIN: ICD-10-CM

## 2019-10-24 DIAGNOSIS — R35.0 FREQUENCY OF URINATION: Primary | ICD-10-CM

## 2019-10-24 PROCEDURE — 99213 OFFICE O/P EST LOW 20 MIN: CPT | Mod: PBBFAC | Performed by: PHYSICIAN ASSISTANT

## 2019-10-24 PROCEDURE — 99999 PR PBB SHADOW E&M-EST. PATIENT-LVL III: CPT | Mod: PBBFAC,,, | Performed by: PHYSICIAN ASSISTANT

## 2019-10-24 PROCEDURE — 87186 SC STD MICRODIL/AGAR DIL: CPT

## 2019-10-24 PROCEDURE — 87088 URINE BACTERIA CULTURE: CPT

## 2019-10-24 PROCEDURE — 87086 URINE CULTURE/COLONY COUNT: CPT

## 2019-10-24 PROCEDURE — 99214 OFFICE O/P EST MOD 30 MIN: CPT | Mod: S$PBB,25,, | Performed by: PHYSICIAN ASSISTANT

## 2019-10-24 PROCEDURE — 99214 PR OFFICE/OUTPT VISIT, EST, LEVL IV, 30-39 MIN: ICD-10-PCS | Mod: S$PBB,25,, | Performed by: PHYSICIAN ASSISTANT

## 2019-10-24 PROCEDURE — 51701 INSERT BLADDER CATHETER: CPT | Mod: S$PBB,,, | Performed by: PHYSICIAN ASSISTANT

## 2019-10-24 PROCEDURE — 87661 TRICHOMONAS VAGINALIS AMPLIF: CPT

## 2019-10-24 PROCEDURE — 99999 PR PBB SHADOW E&M-EST. PATIENT-LVL III: ICD-10-PCS | Mod: PBBFAC,,, | Performed by: PHYSICIAN ASSISTANT

## 2019-10-24 PROCEDURE — 51701 INSERT BLADDER CATHETER: CPT | Mod: PBBFAC | Performed by: PHYSICIAN ASSISTANT

## 2019-10-24 PROCEDURE — 51701 PR INSERTION OF NON-INDWELLING BLADDER CATHETERIZATION FOR RESIDUAL UR: ICD-10-PCS | Mod: S$PBB,,, | Performed by: PHYSICIAN ASSISTANT

## 2019-10-24 PROCEDURE — 87077 CULTURE AEROBIC IDENTIFY: CPT

## 2019-10-24 PROCEDURE — 87481 CANDIDA DNA AMP PROBE: CPT | Mod: 59

## 2019-10-24 PROCEDURE — 87801 DETECT AGNT MULT DNA AMPLI: CPT

## 2019-10-24 RX ORDER — SULFAMETHOXAZOLE AND TRIMETHOPRIM 800; 160 MG/1; MG/1
TABLET ORAL
Refills: 0 | COMMUNITY
Start: 2019-10-09 | End: 2020-10-28

## 2019-10-24 RX ORDER — IBUPROFEN 800 MG/1
TABLET ORAL
Refills: 0 | COMMUNITY
Start: 2019-10-09 | End: 2023-05-05

## 2019-10-24 NOTE — PROGRESS NOTES
CHIEF COMPLAINT:    Mrs. Summers is a 34 y.o. female presenting for bladder infection.  PRESENTING ILLNESS:    Terri Summers is a 34 y.o. female who presents for Bladder infection.     She was last seen in clinic on 10/25/18 by Dr. Soriano.  She took macrodantin for 3 months as a prophylactic. She hasnt had any problems in almost a year.  She had 2 ingrown toe nails that were removed.  She was given amoxicillin for the first toe and then bactrim for 2nd one.  A week into taking bactrim she started having UTI symptoms, frequency, bladder pain, feeling of incomplete bladder emptying, dysuria, small voids.  She reports taking diflucan when she started each antibiotic   She has 4 days of bactrim left to take.  She did not take it today.    She is taking azo.  She started it 2 weeks ago.  It helps sometimes.      Renal u/s 11/2018: No significant abnormality.  Cysto 11/2018:  Urethra was normal.  No diverticula were noted.  Bladder neck was normal.  Both the orifices were normal  in size, shape, and position with clear efflux.  There were no stones, tumors, foreign bodies, or active infections noted.  There was some redness, indicative   of a recent infection.  Urinalysis today was clear.      REVIEW OF SYSTEMS:  Constitutional: Negative for fever and chills.   HENT: Negative for hearing loss.   Eyes: Negative for visual disturbance.   Respiratory: Negative for shortness of breath.   Cardiovascular: Negative for chest pain.   Gastrointestinal: Negative for vomiting, and constipation.   Genitourinary:  See HPI  Neurological: Negative for dizziness.   Hematological: Does not bruise/bleed easily.   Psychiatric/Behavioral: Negative for confusion.     PATIENT HISTORY:    Past Medical History:   Diagnosis Date    ADHD (attention deficit hyperactivity disorder)     Anxiety     History of psychiatric care     Psychiatric exam requested by [facility]     Psychiatric problem     Therapy        Past Surgical History:   Procedure  Laterality Date     SECTION      DILATION AND CURETTAGE OF UTERUS  2008    missed ab       Family History   Problem Relation Age of Onset    ADD / ADHD Father        Social History     Socioeconomic History    Marital status: Single     Spouse name: Not on file    Number of children: Not on file    Years of education: Not on file    Highest education level: Not on file   Occupational History    Not on file   Social Needs    Financial resource strain: Not on file    Food insecurity:     Worry: Not on file     Inability: Not on file    Transportation needs:     Medical: Not on file     Non-medical: Not on file   Tobacco Use    Smoking status: Current Every Day Smoker    Smokeless tobacco: Never Used   Substance and Sexual Activity    Alcohol use: Yes     Comment: rare drink occasionally    Drug use: No     Comment: has abused THC, LSD, 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    Sexual activity: Yes     Partners: Male     Birth control/protection: IUD   Lifestyle    Physical activity:     Days per week: Not on file     Minutes per session: Not on file    Stress: Not on file   Relationships    Social connections:     Talks on phone: Not on file     Gets together: Not on file     Attends Buddhism service: Not on file     Active member of club or organization: Not on file     Attends meetings of clubs or organizations: Not on file     Relationship status: Not on file   Other Topics Concern    Not on file   Social History Narrative    Patient lives with her identical twin 3-year-old daughters and her mother. Patient is employed as an . Denies access to a firearm.        Allergies:  Patient has no known allergies.    Medications:    Current Outpatient Medications:     dextroamphetamine-amphetamine (ADDERALL) 30 mg Tab, Take 30 mg by mouth 2 (two) times daily., Disp: 60 tablet, Rfl: 0    ibuprofen (ADVIL,MOTRIN) 800 MG tablet, TK 1 T PO BID WF PRF PAIN, Disp: , Rfl: 0     sulfamethoxazole-trimethoprim 800-160mg (BACTRIM DS) 800-160 mg Tab, TK 1 T PO BID, Disp: , Rfl: 0    PHYSICAL EXAMINATION:    Constitutional: She appears well-developed and well-nourished.  She is in no apparent distress.    Eyes: No scleral icterus noted bilaterally. No discharge bilaterally.    Nose: No rhinorrhea    Cardiovascular: Normal rate.  No pitting edema noted in lower extremities bilaterally    Pulmonary/Chest: Effort normal. No respiratory distress.     Abdominal:  She exhibits no distension.  There is no CVA tenderness.     Lymphadenopathy:          Right: No supraclavicular adenopathy present.        Left: No supraclavicular adenopathy present.     Neurological: She is alert and oriented to person, place, and time.     Skin: Skin is warm and dry.     Psych: Cooperative with normal affect.    Genitourinary:  Normal external female genitalia  Urethral meatus is normal  Consent verbally obtained.  Betadine prep was applied to the urethral meatus. An in and out cath was performed after voiding.  The PVR was 70 ml.    Physical Exam      LABS:    U/a: 1.020, pH 5, + leuks, otherwise negative.    IMPRESSION:    Encounter Diagnoses   Name Primary?    Frequency of urination Yes    Dysuria     Bladder pain        PLAN:    Urine culture   Vaginosis swab  Discussed azo and that is not to be used for long term treatment. If culture is negative, will give uribel as an alternative to azo.      Follow up based on cultures    Shanta Hamilton PA-C

## 2019-10-25 DIAGNOSIS — B96.89 BACTERIAL VAGINOSIS: Primary | ICD-10-CM

## 2019-10-25 DIAGNOSIS — N76.0 BACTERIAL VAGINOSIS: Primary | ICD-10-CM

## 2019-10-25 LAB
BACTERIAL VAGINOSIS DNA: POSITIVE
CANDIDA GLABRATA DNA: NEGATIVE
CANDIDA KRUSEI DNA: NEGATIVE
CANDIDA RRNA VAG QL PROBE: NEGATIVE
T VAGINALIS RRNA GENITAL QL PROBE: NEGATIVE

## 2019-10-25 RX ORDER — METRONIDAZOLE 500 MG/1
500 TABLET ORAL EVERY 12 HOURS
Qty: 14 TABLET | Refills: 0 | Status: SHIPPED | OUTPATIENT
Start: 2019-10-25 | End: 2019-11-01

## 2019-10-25 NOTE — PROGRESS NOTES
Patient notified of + bV results.  Instructed her how to take abx and not to drink alcohol or use products containing alcohol while taking medication.

## 2019-10-27 LAB — BACTERIA UR CULT: ABNORMAL

## 2019-10-28 DIAGNOSIS — N39.0 BACTERIAL UTI: Primary | ICD-10-CM

## 2019-10-28 DIAGNOSIS — A49.9 BACTERIAL UTI: Primary | ICD-10-CM

## 2019-10-28 RX ORDER — NITROFURANTOIN 25; 75 MG/1; MG/1
100 CAPSULE ORAL 2 TIMES DAILY
Qty: 14 CAPSULE | Refills: 0 | Status: SHIPPED | OUTPATIENT
Start: 2019-10-28 | End: 2019-11-04

## 2019-10-30 ENCOUNTER — TELEPHONE (OUTPATIENT)
Dept: UROLOGY | Facility: CLINIC | Age: 34
End: 2019-10-30

## 2019-10-30 NOTE — TELEPHONE ENCOUNTER
R/t call, no answer. Message was already sent to pt portal.        ----- Message from Gonzales Lynch sent at 10/30/2019 12:35 PM CDT -----  Contact: pt: [phone]  Pt state she's returning a call from the clinic re results    Please contact pt: [phone]

## 2019-11-26 ENCOUNTER — TELEPHONE (OUTPATIENT)
Dept: OBSTETRICS AND GYNECOLOGY | Facility: CLINIC | Age: 34
End: 2019-11-26

## 2019-11-26 NOTE — TELEPHONE ENCOUNTER
----- Message from Viv Smalls sent at 11/26/2019 10:14 AM CST -----  Contact: self, [phone]  Patient requests to be seen tomorrow by you or anyone available, states she has a couple of issues going on. Please advise.

## 2020-10-28 ENCOUNTER — OFFICE VISIT (OUTPATIENT)
Dept: OBSTETRICS AND GYNECOLOGY | Facility: CLINIC | Age: 35
End: 2020-10-28
Payer: MEDICAID

## 2020-10-28 VITALS
DIASTOLIC BLOOD PRESSURE: 70 MMHG | WEIGHT: 171.94 LBS | SYSTOLIC BLOOD PRESSURE: 116 MMHG | BODY MASS INDEX: 27.75 KG/M2

## 2020-10-28 DIAGNOSIS — R30.0 DYSURIA: ICD-10-CM

## 2020-10-28 DIAGNOSIS — N89.8 VAGINAL DISCHARGE: ICD-10-CM

## 2020-10-28 DIAGNOSIS — Z01.419 WELL WOMAN EXAM WITH ROUTINE GYNECOLOGICAL EXAM: Primary | ICD-10-CM

## 2020-10-28 DIAGNOSIS — Z30.431 IUD CHECK UP: ICD-10-CM

## 2020-10-28 LAB
BILIRUB SERPL-MCNC: NORMAL MG/DL
BLOOD URINE, POC: NEGATIVE
CLARITY, POC UA: NORMAL
COLOR, POC UA: NORMAL
GLUCOSE UR QL STRIP: NEGATIVE
KETONES UR QL STRIP: NORMAL
LEUKOCYTE ESTERASE URINE, POC: NEGATIVE
NITRITE, POC UA: NEGATIVE
PH, POC UA: NORMAL
PROTEIN, POC: NEGATIVE
SPECIFIC GRAVITY, POC UA: NORMAL
UROBILINOGEN, POC UA: NORMAL

## 2020-10-28 PROCEDURE — 99213 OFFICE O/P EST LOW 20 MIN: CPT | Mod: PBBFAC,PO | Performed by: OBSTETRICS & GYNECOLOGY

## 2020-10-28 PROCEDURE — 99395 PREV VISIT EST AGE 18-39: CPT | Mod: S$PBB,,, | Performed by: OBSTETRICS & GYNECOLOGY

## 2020-10-28 PROCEDURE — 81002 URINALYSIS NONAUTO W/O SCOPE: CPT | Mod: PBBFAC,PO | Performed by: OBSTETRICS & GYNECOLOGY

## 2020-10-28 PROCEDURE — 99999 PR PBB SHADOW E&M-EST. PATIENT-LVL III: ICD-10-PCS | Mod: PBBFAC,,, | Performed by: OBSTETRICS & GYNECOLOGY

## 2020-10-28 PROCEDURE — 87086 URINE CULTURE/COLONY COUNT: CPT

## 2020-10-28 PROCEDURE — 99395 PR PREVENTIVE VISIT,EST,18-39: ICD-10-PCS | Mod: S$PBB,,, | Performed by: OBSTETRICS & GYNECOLOGY

## 2020-10-28 PROCEDURE — 99999 PR PBB SHADOW E&M-EST. PATIENT-LVL III: CPT | Mod: PBBFAC,,, | Performed by: OBSTETRICS & GYNECOLOGY

## 2020-10-28 RX ORDER — NITROFURANTOIN 25; 75 MG/1; MG/1
100 CAPSULE ORAL 2 TIMES DAILY
Qty: 14 CAPSULE | Refills: 0 | Status: SHIPPED | OUTPATIENT
Start: 2020-10-28 | End: 2020-11-04

## 2020-10-28 RX ORDER — FLUCONAZOLE 150 MG/1
TABLET ORAL
Qty: 2 TABLET | Refills: 0 | Status: SHIPPED | OUTPATIENT
Start: 2020-10-28 | End: 2021-11-12

## 2020-10-28 NOTE — PROGRESS NOTES
GYNECOLOGY OFFICE NOTE    Reason for visit: annual    HPI: Pt is a 35 y.o.  female  who presents for annual. Cycle: menarche- 16, Interval- none with mirena inserted 2017. She is sexually active.    She reports vaginal discharge/itching like yeast. Also having UTI symptoms- frequency etc. Taking AZO.  Last pap: 2019-neg pap/hpv, denies hx of abnormal.      Past Medical History:   Diagnosis Date    ADHD (attention deficit hyperactivity disorder)     Anxiety     History of psychiatric care     Psychiatric exam requested by authority     Psychiatric problem     Therapy        Past Surgical History:   Procedure Laterality Date     SECTION      DILATION AND CURETTAGE OF UTERUS      missed ab       Family History   Problem Relation Age of Onset    ADD / ADHD Father     Ovarian cancer Mother     Ovarian cancer Maternal Grandmother     Breast cancer Neg Hx     Colon cancer Neg Hx        Social History     Tobacco Use    Smoking status: Current Every Day Smoker    Smokeless tobacco: Never Used   Substance Use Topics    Alcohol use: Yes     Comment: rare drink occasionally    Drug use: No     Comment: has abused THC, LSD, 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    ibuprofen (ADVIL,MOTRIN) 800 MG tablet TK 1 T PO BID WF PRF PAIN    fluconazole (DIFLUCAN) 150 MG Tab Take one pill by mouth once and repeat dose in 3 days if symptoms persist    nitrofurantoin, macrocrystal-monohydrate, (MACROBID) 100 MG capsule Take 1 capsule (100 mg total) by mouth 2 (two) times daily. for 7 days      No current facility-administered medications for this visit.        Allergies: Patient has no known allergies.     /70   Wt 78 kg (171 lb 15.3 oz)   LMP  (LMP Unknown)   BMI 27.75 kg/m²     ROS:  GENERAL: Denies fever or chills.   SKIN: Denies rash or lesions.   HEAD: Denies head injury or headache.   CHEST: Denies chest pain or shortness of breath.   CARDIOVASCULAR: Denies palpitations or chest pain.   ABDOMEN: No constipation, diarrhea, nausea, vomiting or rectal bleeding.   URINARY: see HPI  REPRODUCTIVE: See HPI.   BREASTS: see HPI  NEUROLOGIC: Denies syncope or weakness.     Physical Exam:  GENERAL: alert, appears stated age and cooperative  NEUROLOGIC: orientated to person, place and time, normal mood and affect   CHEST: Normal respiratory effort  NECK: normal appearance  SKIN: no acne, hirsutism  BREAST EXAM: breasts appear normal, no suspicious masses, no skin or nipple changes or axillary nodes  ABDOMEN: abdomen is soft without significant tenderness, masses  EXTERNAL GENITALIA:  normal general appearance  URETHRA: normal urethra, normal urethral meatus  VAGINA:  normal mucosa, no  lesions  CERVIX:  Normal- IUD strings not visible  UTERUS:  mobile, non tender  ADNEXA: nontender    Diagnosis:  1. Well woman exam with routine gynecological exam    2. Dysuria    3. Vaginal discharge    4. IUD check up        Plan:   1. Annual  2. F/u urine culture- rx macrobid sent  3. F/u affirm- rx diflucan sent  4. Repeat imaging for checking on position. Due for removal ~4/2022    Orders Placed This Encounter    Urine culture    Vaginosis Screen by DNA Probe    US Pelvis Limited_IUD Placement or Check    POCT urine dipstick without microscope    nitrofurantoin, macrocrystal-monohydrate, (MACROBID) 100 MG capsule    fluconazole (DIFLUCAN) 150 MG Tab         RTC in 1 year for annual      Patient was counseled today on the new ACS guidelines for cervical cytology screening as well as the current  recommendations for breast cancer screening. She was counseled to follow up with her PCP for other routine health maintenance.       Carmen Yoder MD  OB/GYN

## 2020-10-30 LAB — BACTERIA UR CULT: NORMAL

## 2020-10-31 LAB
CANDIDA RRNA VAG QL PROBE: NEGATIVE
G VAGINALIS RRNA GENITAL QL PROBE: POSITIVE
T VAGINALIS RRNA GENITAL QL PROBE: NEGATIVE

## 2020-11-02 RX ORDER — METRONIDAZOLE 500 MG/1
500 TABLET ORAL EVERY 12 HOURS
Qty: 14 TABLET | Refills: 0 | Status: SHIPPED | OUTPATIENT
Start: 2020-11-02 | End: 2020-11-09

## 2021-03-05 DIAGNOSIS — S63.641A: Primary | ICD-10-CM

## 2021-03-10 ENCOUNTER — CLINICAL SUPPORT (OUTPATIENT)
Dept: REHABILITATION | Facility: HOSPITAL | Age: 36
End: 2021-03-10
Attending: SURGERY
Payer: MEDICAID

## 2021-03-10 DIAGNOSIS — M25.60 STIFFNESS IN JOINT: ICD-10-CM

## 2021-03-10 DIAGNOSIS — R52 PAIN: ICD-10-CM

## 2021-03-10 PROCEDURE — L3806 WHFO W/JOINT(S) CUSTOM FAB: HCPCS | Mod: PN

## 2021-03-24 ENCOUNTER — CLINICAL SUPPORT (OUTPATIENT)
Dept: REHABILITATION | Facility: HOSPITAL | Age: 36
End: 2021-03-24
Attending: SURGERY
Payer: MEDICAID

## 2021-03-24 DIAGNOSIS — M25.60 STIFFNESS IN JOINT: ICD-10-CM

## 2021-03-24 DIAGNOSIS — R52 PAIN: ICD-10-CM

## 2021-03-24 PROCEDURE — 97165 OT EVAL LOW COMPLEX 30 MIN: CPT | Mod: PN

## 2021-03-24 PROCEDURE — 97110 THERAPEUTIC EXERCISES: CPT | Mod: PN

## 2021-03-26 ENCOUNTER — CLINICAL SUPPORT (OUTPATIENT)
Dept: REHABILITATION | Facility: HOSPITAL | Age: 36
End: 2021-03-26
Attending: SURGERY
Payer: MEDICAID

## 2021-03-26 DIAGNOSIS — M25.60 STIFFNESS IN JOINT: ICD-10-CM

## 2021-03-26 DIAGNOSIS — R52 PAIN: ICD-10-CM

## 2021-03-26 PROCEDURE — 97530 THERAPEUTIC ACTIVITIES: CPT | Mod: PN

## 2021-03-31 ENCOUNTER — CLINICAL SUPPORT (OUTPATIENT)
Dept: REHABILITATION | Facility: HOSPITAL | Age: 36
End: 2021-03-31
Attending: SURGERY
Payer: MEDICAID

## 2021-03-31 DIAGNOSIS — R52 PAIN: ICD-10-CM

## 2021-03-31 DIAGNOSIS — M25.60 STIFFNESS IN JOINT: ICD-10-CM

## 2021-03-31 PROCEDURE — 97530 THERAPEUTIC ACTIVITIES: CPT | Mod: PN

## 2021-04-07 ENCOUNTER — CLINICAL SUPPORT (OUTPATIENT)
Dept: REHABILITATION | Facility: HOSPITAL | Age: 36
End: 2021-04-07
Attending: SURGERY
Payer: MEDICAID

## 2021-04-07 DIAGNOSIS — R52 PAIN: ICD-10-CM

## 2021-04-07 DIAGNOSIS — M25.60 STIFFNESS IN JOINT: ICD-10-CM

## 2021-04-07 PROCEDURE — 97530 THERAPEUTIC ACTIVITIES: CPT | Mod: PN

## 2021-05-04 ENCOUNTER — TELEPHONE (OUTPATIENT)
Dept: REHABILITATION | Facility: HOSPITAL | Age: 36
End: 2021-05-04

## 2021-05-05 DIAGNOSIS — S63.641A: Primary | ICD-10-CM

## 2021-11-09 ENCOUNTER — TELEPHONE (OUTPATIENT)
Dept: PSYCHIATRY | Facility: CLINIC | Age: 36
End: 2021-11-09
Payer: MEDICAID

## 2021-11-12 ENCOUNTER — OFFICE VISIT (OUTPATIENT)
Dept: OBSTETRICS AND GYNECOLOGY | Facility: CLINIC | Age: 36
End: 2021-11-12
Payer: MEDICAID

## 2021-11-12 ENCOUNTER — TELEPHONE (OUTPATIENT)
Dept: OBSTETRICS AND GYNECOLOGY | Facility: CLINIC | Age: 36
End: 2021-11-12

## 2021-11-12 VITALS
WEIGHT: 186.75 LBS | DIASTOLIC BLOOD PRESSURE: 98 MMHG | SYSTOLIC BLOOD PRESSURE: 134 MMHG | BODY MASS INDEX: 30.14 KG/M2

## 2021-11-12 DIAGNOSIS — Z01.419 WELL WOMAN EXAM WITH ROUTINE GYNECOLOGICAL EXAM: Primary | ICD-10-CM

## 2021-11-12 DIAGNOSIS — F90.2 ADHD (ATTENTION DEFICIT HYPERACTIVITY DISORDER), COMBINED TYPE: ICD-10-CM

## 2021-11-12 DIAGNOSIS — Z12.4 SCREENING FOR CERVICAL CANCER: ICD-10-CM

## 2021-11-12 DIAGNOSIS — R30.0 DYSURIA: ICD-10-CM

## 2021-11-12 DIAGNOSIS — N89.8 VAGINAL DISCHARGE: ICD-10-CM

## 2021-11-12 PROCEDURE — 99999 PR PBB SHADOW E&M-EST. PATIENT-LVL III: ICD-10-PCS | Mod: PBBFAC,,, | Performed by: OBSTETRICS & GYNECOLOGY

## 2021-11-12 PROCEDURE — 88175 CYTOPATH C/V AUTO FLUID REDO: CPT | Performed by: OBSTETRICS & GYNECOLOGY

## 2021-11-12 PROCEDURE — 99999 PR PBB SHADOW E&M-EST. PATIENT-LVL III: CPT | Mod: PBBFAC,,, | Performed by: OBSTETRICS & GYNECOLOGY

## 2021-11-12 PROCEDURE — 87481 CANDIDA DNA AMP PROBE: CPT | Mod: 59 | Performed by: OBSTETRICS & GYNECOLOGY

## 2021-11-12 PROCEDURE — 99395 PR PREVENTIVE VISIT,EST,18-39: ICD-10-PCS | Mod: S$PBB,,, | Performed by: OBSTETRICS & GYNECOLOGY

## 2021-11-12 PROCEDURE — 99395 PREV VISIT EST AGE 18-39: CPT | Mod: S$PBB,,, | Performed by: OBSTETRICS & GYNECOLOGY

## 2021-11-12 PROCEDURE — 87591 N.GONORRHOEAE DNA AMP PROB: CPT | Performed by: OBSTETRICS & GYNECOLOGY

## 2021-11-12 PROCEDURE — 99213 OFFICE O/P EST LOW 20 MIN: CPT | Mod: PBBFAC,PO | Performed by: OBSTETRICS & GYNECOLOGY

## 2021-11-12 PROCEDURE — 87491 CHLMYD TRACH DNA AMP PROBE: CPT | Performed by: OBSTETRICS & GYNECOLOGY

## 2021-11-12 RX ORDER — LOSARTAN POTASSIUM 50 MG/1
TABLET ORAL
COMMUNITY
End: 2022-12-08

## 2021-11-15 LAB
C TRACH DNA SPEC QL NAA+PROBE: NOT DETECTED
N GONORRHOEA DNA SPEC QL NAA+PROBE: NOT DETECTED

## 2021-11-20 LAB
BACTERIAL VAGINOSIS DNA: POSITIVE
CANDIDA GLABRATA DNA: NEGATIVE
CANDIDA KRUSEI DNA: NEGATIVE
CANDIDA RRNA VAG QL PROBE: NEGATIVE
T VAGINALIS RRNA GENITAL QL PROBE: POSITIVE

## 2021-11-23 ENCOUNTER — PATIENT MESSAGE (OUTPATIENT)
Dept: OBSTETRICS AND GYNECOLOGY | Facility: CLINIC | Age: 36
End: 2021-11-23
Payer: MEDICAID

## 2021-11-23 ENCOUNTER — TELEPHONE (OUTPATIENT)
Dept: OBSTETRICS AND GYNECOLOGY | Facility: CLINIC | Age: 36
End: 2021-11-23
Payer: MEDICAID

## 2021-11-23 DIAGNOSIS — F90.2 ADHD (ATTENTION DEFICIT HYPERACTIVITY DISORDER), COMBINED TYPE: ICD-10-CM

## 2021-11-23 RX ORDER — METRONIDAZOLE 500 MG/1
500 TABLET ORAL EVERY 12 HOURS
Qty: 14 TABLET | Refills: 0 | Status: SHIPPED | OUTPATIENT
Start: 2021-11-23 | End: 2021-11-30

## 2021-11-23 RX ORDER — METRONIDAZOLE 500 MG/1
2000 TABLET ORAL ONCE
Qty: 4 TABLET | Refills: 0 | Status: SHIPPED | OUTPATIENT
Start: 2021-11-23 | End: 2021-11-23

## 2021-12-21 ENCOUNTER — OFFICE VISIT (OUTPATIENT)
Dept: PSYCHIATRY | Facility: CLINIC | Age: 36
End: 2021-12-21
Payer: MEDICAID

## 2021-12-21 VITALS
WEIGHT: 188.5 LBS | HEART RATE: 82 BPM | DIASTOLIC BLOOD PRESSURE: 99 MMHG | SYSTOLIC BLOOD PRESSURE: 156 MMHG | BODY MASS INDEX: 30.42 KG/M2

## 2021-12-21 DIAGNOSIS — F90.2 ADHD (ATTENTION DEFICIT HYPERACTIVITY DISORDER), COMBINED TYPE: Primary | ICD-10-CM

## 2021-12-21 DIAGNOSIS — F41.0 GENERALIZED ANXIETY DISORDER WITH PANIC ATTACKS: ICD-10-CM

## 2021-12-21 DIAGNOSIS — F41.1 GENERALIZED ANXIETY DISORDER WITH PANIC ATTACKS: ICD-10-CM

## 2021-12-21 PROCEDURE — 99205 OFFICE O/P NEW HI 60 MIN: CPT | Mod: S$PBB,SA,HB, | Performed by: NURSE PRACTITIONER

## 2021-12-21 PROCEDURE — 4010F PR ACE/ARB THEARPY RXD/TAKEN: ICD-10-PCS | Mod: SA,HB,CPTII, | Performed by: NURSE PRACTITIONER

## 2021-12-21 PROCEDURE — 99999 PR PBB SHADOW E&M-EST. PATIENT-LVL III: ICD-10-PCS | Mod: PBBFAC,SA,HB, | Performed by: NURSE PRACTITIONER

## 2021-12-21 PROCEDURE — 99205 PR OFFICE/OUTPT VISIT, NEW, LEVL V, 60-74 MIN: ICD-10-PCS | Mod: S$PBB,SA,HB, | Performed by: NURSE PRACTITIONER

## 2021-12-21 PROCEDURE — 99213 OFFICE O/P EST LOW 20 MIN: CPT | Mod: PBBFAC | Performed by: NURSE PRACTITIONER

## 2021-12-21 PROCEDURE — 4010F ACE/ARB THERAPY RXD/TAKEN: CPT | Mod: SA,HB,CPTII, | Performed by: NURSE PRACTITIONER

## 2021-12-21 PROCEDURE — 99999 PR PBB SHADOW E&M-EST. PATIENT-LVL III: CPT | Mod: PBBFAC,SA,HB, | Performed by: NURSE PRACTITIONER

## 2021-12-21 RX ORDER — ALPRAZOLAM 0.5 MG/1
0.5 TABLET ORAL DAILY PRN
Qty: 30 TABLET | Refills: 5 | Status: SHIPPED | OUTPATIENT
Start: 2021-12-21 | End: 2022-05-09 | Stop reason: SDUPTHER

## 2021-12-21 RX ORDER — DEXTROAMPHETAMINE SACCHARATE, AMPHETAMINE ASPARTATE, DEXTROAMPHETAMINE SULFATE AND AMPHETAMINE SULFATE 7.5; 7.5; 7.5; 7.5 MG/1; MG/1; MG/1; MG/1
30 TABLET ORAL 2 TIMES DAILY
Qty: 60 TABLET | Refills: 0 | Status: SHIPPED | OUTPATIENT
Start: 2022-02-07 | End: 2022-03-07 | Stop reason: SDUPTHER

## 2021-12-21 RX ORDER — DEXTROAMPHETAMINE SACCHARATE, AMPHETAMINE ASPARTATE, DEXTROAMPHETAMINE SULFATE AND AMPHETAMINE SULFATE 7.5; 7.5; 7.5; 7.5 MG/1; MG/1; MG/1; MG/1
30 TABLET ORAL 2 TIMES DAILY
Qty: 60 TABLET | Refills: 0 | Status: SHIPPED | OUTPATIENT
Start: 2022-01-07 | End: 2022-03-07 | Stop reason: SDUPTHER

## 2021-12-21 RX ORDER — DEXTROAMPHETAMINE SACCHARATE, AMPHETAMINE ASPARTATE, DEXTROAMPHETAMINE SULFATE AND AMPHETAMINE SULFATE 7.5; 7.5; 7.5; 7.5 MG/1; MG/1; MG/1; MG/1
30 TABLET ORAL 2 TIMES DAILY
Qty: 60 TABLET | Refills: 0 | Status: SHIPPED | OUTPATIENT
Start: 2022-03-07 | End: 2022-03-07 | Stop reason: SDUPTHER

## 2022-03-03 ENCOUNTER — PATIENT MESSAGE (OUTPATIENT)
Dept: PSYCHIATRY | Facility: CLINIC | Age: 37
End: 2022-03-03
Payer: MEDICAID

## 2022-03-04 ENCOUNTER — PATIENT MESSAGE (OUTPATIENT)
Dept: PSYCHIATRY | Facility: CLINIC | Age: 37
End: 2022-03-04
Payer: MEDICAID

## 2022-03-07 ENCOUNTER — OFFICE VISIT (OUTPATIENT)
Dept: PSYCHIATRY | Facility: CLINIC | Age: 37
End: 2022-03-07
Payer: MEDICAID

## 2022-03-07 DIAGNOSIS — F90.2 ADHD (ATTENTION DEFICIT HYPERACTIVITY DISORDER), COMBINED TYPE: Primary | ICD-10-CM

## 2022-03-07 DIAGNOSIS — F41.0 GENERALIZED ANXIETY DISORDER WITH PANIC ATTACKS: ICD-10-CM

## 2022-03-07 DIAGNOSIS — F41.1 GENERALIZED ANXIETY DISORDER WITH PANIC ATTACKS: ICD-10-CM

## 2022-03-07 PROCEDURE — 99213 OFFICE O/P EST LOW 20 MIN: CPT | Mod: SA,HB,95, | Performed by: NURSE PRACTITIONER

## 2022-03-07 PROCEDURE — 4010F PR ACE/ARB THEARPY RXD/TAKEN: ICD-10-PCS | Mod: SA,HB,CPTII,95 | Performed by: NURSE PRACTITIONER

## 2022-03-07 PROCEDURE — 4010F ACE/ARB THERAPY RXD/TAKEN: CPT | Mod: SA,HB,CPTII,95 | Performed by: NURSE PRACTITIONER

## 2022-03-07 PROCEDURE — 99213 PR OFFICE/OUTPT VISIT, EST, LEVL III, 20-29 MIN: ICD-10-PCS | Mod: SA,HB,95, | Performed by: NURSE PRACTITIONER

## 2022-03-07 RX ORDER — DEXTROAMPHETAMINE SACCHARATE, AMPHETAMINE ASPARTATE, DEXTROAMPHETAMINE SULFATE AND AMPHETAMINE SULFATE 7.5; 7.5; 7.5; 7.5 MG/1; MG/1; MG/1; MG/1
30 TABLET ORAL 2 TIMES DAILY
Qty: 60 TABLET | Refills: 0 | Status: SHIPPED | OUTPATIENT
Start: 2022-04-07 | End: 2022-05-09 | Stop reason: SDUPTHER

## 2022-03-07 RX ORDER — DEXTROAMPHETAMINE SACCHARATE, AMPHETAMINE ASPARTATE, DEXTROAMPHETAMINE SULFATE AND AMPHETAMINE SULFATE 7.5; 7.5; 7.5; 7.5 MG/1; MG/1; MG/1; MG/1
30 TABLET ORAL 2 TIMES DAILY
Qty: 60 TABLET | Refills: 0 | Status: SHIPPED | OUTPATIENT
Start: 2022-03-07 | End: 2022-05-09 | Stop reason: SDUPTHER

## 2022-03-07 RX ORDER — DEXTROAMPHETAMINE SACCHARATE, AMPHETAMINE ASPARTATE, DEXTROAMPHETAMINE SULFATE AND AMPHETAMINE SULFATE 7.5; 7.5; 7.5; 7.5 MG/1; MG/1; MG/1; MG/1
30 TABLET ORAL 2 TIMES DAILY
Qty: 60 TABLET | Refills: 0 | Status: SHIPPED | OUTPATIENT
Start: 2022-05-07 | End: 2022-05-09 | Stop reason: SDUPTHER

## 2022-03-07 NOTE — PROGRESS NOTES
Outpatient Psychiatry Follow-Up Visit (MD/NP)    3/7/2022    Clinical Status of Patient:  Outpatient (Ambulatory)    Chief Complaint:  Terri Summers is a 36 y.o. female who presents today for follow-up of attention problems.  Met with patient.      Last visit was:   The patient location is: home  The chief complaint leading to consultation is: attention problems    Visit type: audiovisual    Face to Face time with patient: 27 minutes  30 minutes of total time spent on the encounter, which includes face to face time and non-face to face time preparing to see the patient (eg, review of tests), Obtaining and/or reviewing separately obtained history, Documenting clinical information in the electronic or other health record, Independently interpreting results (not separately reported) and communicating results to the patient/family/caregiver, or Care coordination (not separately reported).     Each patient to whom he or she provides medical services by telemedicine is:  (1) informed of the relationship between the physician and patient and the respective role of any other health care provider with respect to management of the patient; and (2) notified that he or she may decline to receive medical services by telemedicine and may withdraw from such care at any time.    Interval History and Content of Current Session:  Current Psychiatric Medications/changes  · Continue Adderall 30 mg po BID  · Continue Xanax 0.5 mg po daily PRN panic attacks    Virtual Visit: Pt presents bright affect and euthymic mood. Reports improvement in anxiety and ADH symptoms with current medications. Reports that she is doing well. Denies SI/HI/AVH. Will continue meds    Psychotherapy:  · Target symptoms: distractability, lack of focus  · Why chosen therapy is appropriate versus another modality: relevant to diagnosis  · Outcome monitoring methods: self-report  · Therapeutic intervention type: insight oriented psychotherapy  · Topics  discussed/themes: building skills sets for symptom management, symptom recognition  · The patient's response to the intervention is accepting. The patient's progress toward treatment goals is good.   · Duration of intervention: 14 minutes.    Review of Systems   · PSYCHIATRIC: Pertinant items are noted in the narrative.  · CONSTITUTIONAL: No weight gain or loss.   · MUSCULOSKELETAL: No pain or stiffness of the joints.  · NEUROLOGIC: No weakness, sensory changes, seizures, confusion, memory loss, tremor or other abnormal movements.  · ENDOCRINE: No polydipsia or polyuria.  · INTEGUMENTARY: No rashes or lacerations.  · EYES: No exophthalmos, jaundice or blindness.  · ENT: No dizziness, tinnitus or hearing loss.  · RESPIRATORY: No shortness of breath.  · CARDIOVASCULAR: No tachycardia or chest pain.  · GASTROINTESTINAL: No nausea, vomiting, pain, constipation or diarrhea.  · GENITOURINARY: No frequency, dysuria or sexual dysfunction.  · HEMATOLOGIC/LYMPHATIC: No excessive bleeding, prolonged or excessive bleeding after dental extraction/injury.  · ALLERGIC/IMMUNOLOGIC: No allergic response to materials, foods or animals at this time.    Past Medical, Family and Social History: The patient's past medical, family and social history have been reviewed and updated as appropriate within the electronic medical record - see encounter notes.    Compliance: no    Side effects: None    Risk Parameters:  Patient reports no suicidal ideation  Patient reports no homicidal ideation  Patient reports no self-injurious behavior  Patient reports no violent behavior    Exam (detailed: at least 9 elements; comprehensive: all 15 elements)   Constitutional  Vitals:  Most recent vital signs, dated greater than 90 days prior to this appointment, were not reviewed.   There were no vitals filed for this visit.     General:  unremarkable, age appropriate     Musculoskeletal  Muscle Strength/Tone:  no tremor   Gait & Station:  non-ataxic      Psychiatric  Speech:  no latency; no press   Mood & Affect:  steady  congruent and appropriate   Thought Process:  normal and logical   Associations:  intact   Thought Content:  normal, no suicidality, no homicidality, delusions, or paranoia   Insight:  intact   Judgement: behavior is adequate to circumstances   Orientation:  grossly intact   Memory: intact for content of interview   Language: grossly intact   Attention Span & Concentration:  able to focus   Fund of Knowledge:  intact and appropriate to age and level of education     Assessment and Diagnosis   Status/Progress: Based on the examination today, the patient's problem(s) is/are improved.  New problems have not been presented today.   Co-morbidities and Lack of compliance are not complicating management of the primary condition.  There are no active rule-out diagnoses for this patient at this time.     General Impression:       ICD-10-CM ICD-9-CM   1. ADHD (attention deficit hyperactivity disorder), combined type  F90.2 314.01   2. Generalized anxiety disorder with panic attacks  F41.1 300.02    F41.0 300.01       Intervention/Counseling/Treatment Plan   · Medication Management: The risks and benefits of medication were discussed with the patient.  · Continue Adderall 30 mg po BID  · Continue Xanax 0.5 mg po daily PRN panic attacks    Return to Clinic: 3 months    Risks, benefits, side effects and alternative treatments discussed with patient. Patient agrees with the current plan as documented.  Encouraged Patient to keep future appointments.  Take medications as prescribed and abstain from substance abuse.  Pt to present to ED for thoughts to harm herself or others

## 2022-05-09 ENCOUNTER — OFFICE VISIT (OUTPATIENT)
Dept: PSYCHIATRY | Facility: CLINIC | Age: 37
End: 2022-05-09
Payer: MEDICAID

## 2022-05-09 DIAGNOSIS — F90.2 ADHD (ATTENTION DEFICIT HYPERACTIVITY DISORDER), COMBINED TYPE: ICD-10-CM

## 2022-05-09 DIAGNOSIS — F41.1 GENERALIZED ANXIETY DISORDER WITH PANIC ATTACKS: ICD-10-CM

## 2022-05-09 DIAGNOSIS — F41.0 GENERALIZED ANXIETY DISORDER WITH PANIC ATTACKS: ICD-10-CM

## 2022-05-09 PROCEDURE — 4010F ACE/ARB THERAPY RXD/TAKEN: CPT | Mod: SA,HB,CPTII,95 | Performed by: NURSE PRACTITIONER

## 2022-05-09 PROCEDURE — 4010F PR ACE/ARB THEARPY RXD/TAKEN: ICD-10-PCS | Mod: SA,HB,CPTII,95 | Performed by: NURSE PRACTITIONER

## 2022-05-09 PROCEDURE — 99213 PR OFFICE/OUTPT VISIT, EST, LEVL III, 20-29 MIN: ICD-10-PCS | Mod: SA,HB,95, | Performed by: NURSE PRACTITIONER

## 2022-05-09 PROCEDURE — 99213 OFFICE O/P EST LOW 20 MIN: CPT | Mod: SA,HB,95, | Performed by: NURSE PRACTITIONER

## 2022-05-09 RX ORDER — ALPRAZOLAM 0.5 MG/1
0.5 TABLET ORAL DAILY PRN
Qty: 30 TABLET | Refills: 5 | Status: SHIPPED | OUTPATIENT
Start: 2022-05-09 | End: 2022-09-30 | Stop reason: SDUPTHER

## 2022-05-09 RX ORDER — DEXTROAMPHETAMINE SACCHARATE, AMPHETAMINE ASPARTATE, DEXTROAMPHETAMINE SULFATE AND AMPHETAMINE SULFATE 7.5; 7.5; 7.5; 7.5 MG/1; MG/1; MG/1; MG/1
30 TABLET ORAL 2 TIMES DAILY
Qty: 60 TABLET | Refills: 0 | Status: SHIPPED | OUTPATIENT
Start: 2022-07-09 | End: 2022-07-25 | Stop reason: SDUPTHER

## 2022-05-09 RX ORDER — DEXTROAMPHETAMINE SACCHARATE, AMPHETAMINE ASPARTATE, DEXTROAMPHETAMINE SULFATE AND AMPHETAMINE SULFATE 7.5; 7.5; 7.5; 7.5 MG/1; MG/1; MG/1; MG/1
30 TABLET ORAL 2 TIMES DAILY
Qty: 60 TABLET | Refills: 0 | Status: SHIPPED | OUTPATIENT
Start: 2022-05-09 | End: 2022-07-11 | Stop reason: SDUPTHER

## 2022-05-09 RX ORDER — DEXTROAMPHETAMINE SACCHARATE, AMPHETAMINE ASPARTATE, DEXTROAMPHETAMINE SULFATE AND AMPHETAMINE SULFATE 7.5; 7.5; 7.5; 7.5 MG/1; MG/1; MG/1; MG/1
30 TABLET ORAL 2 TIMES DAILY
Qty: 60 TABLET | Refills: 0 | Status: SHIPPED | OUTPATIENT
Start: 2022-06-09 | End: 2022-07-25 | Stop reason: SDUPTHER

## 2022-05-09 NOTE — PROGRESS NOTES
Outpatient Psychiatry Follow-Up Visit (MD/NP)    5/9/2022    Clinical Status of Patient:  Outpatient (Ambulatory)    Chief Complaint:  Terri Summers is a 36 y.o. female who presents today for follow-up of attention problems.  Met with patient.      Last visit was: 3/07/22  The patient location is: home  The chief complaint leading to consultation is: attention problems    Visit type: audiovisual    Face to Face time with patient: 27 minutes  30 minutes of total time spent on the encounter, which includes face to face time and non-face to face time preparing to see the patient (eg, review of tests), Obtaining and/or reviewing separately obtained history, Documenting clinical information in the electronic or other health record, Independently interpreting results (not separately reported) and communicating results to the patient/family/caregiver, or Care coordination (not separately reported).     Each patient to whom he or she provides medical services by telemedicine is:  (1) informed of the relationship between the physician and patient and the respective role of any other health care provider with respect to management of the patient; and (2) notified that he or she may decline to receive medical services by telemedicine and may withdraw from such care at any time.    Interval History and Content of Current Session:  Current Psychiatric Medications/changes  · Continue Adderall 30 mg po BID  · Continue Xanax 0.5 mg po daily PRN panic attacks    Virtual Visit: Pt presents bright affect and euthymic mood. Reports improvement in anxiety and ADH symptoms with current medications. Reports that she is doing well. Denies SI/HI/AVH. Will continue meds    Psychotherapy:  · Target symptoms: distractability, lack of focus  · Why chosen therapy is appropriate versus another modality: relevant to diagnosis  · Outcome monitoring methods: self-report  · Therapeutic intervention type: insight oriented psychotherapy  · Topics  discussed/themes: building skills sets for symptom management, symptom recognition  · The patient's response to the intervention is accepting. The patient's progress toward treatment goals is good.   · Duration of intervention: 14 minutes.    Review of Systems   · PSYCHIATRIC: Pertinant items are noted in the narrative.  · CONSTITUTIONAL: No weight gain or loss.   · MUSCULOSKELETAL: No pain or stiffness of the joints.  · NEUROLOGIC: No weakness, sensory changes, seizures, confusion, memory loss, tremor or other abnormal movements.  · ENDOCRINE: No polydipsia or polyuria.  · INTEGUMENTARY: No rashes or lacerations.  · EYES: No exophthalmos, jaundice or blindness.  · ENT: No dizziness, tinnitus or hearing loss.  · RESPIRATORY: No shortness of breath.  · CARDIOVASCULAR: No tachycardia or chest pain.  · GASTROINTESTINAL: No nausea, vomiting, pain, constipation or diarrhea.  · GENITOURINARY: No frequency, dysuria or sexual dysfunction.  · HEMATOLOGIC/LYMPHATIC: No excessive bleeding, prolonged or excessive bleeding after dental extraction/injury.  · ALLERGIC/IMMUNOLOGIC: No allergic response to materials, foods or animals at this time.    Past Medical, Family and Social History: The patient's past medical, family and social history have been reviewed and updated as appropriate within the electronic medical record - see encounter notes.    Compliance: no    Side effects: None    Risk Parameters:  Patient reports no suicidal ideation  Patient reports no homicidal ideation  Patient reports no self-injurious behavior  Patient reports no violent behavior    Exam (detailed: at least 9 elements; comprehensive: all 15 elements)   Constitutional  Vitals:  Most recent vital signs, dated greater than 90 days prior to this appointment, were not reviewed.   There were no vitals filed for this visit.     General:  unremarkable, age appropriate     Musculoskeletal  Muscle Strength/Tone:  no tremor   Gait & Station:  non-ataxic      Psychiatric  Speech:  no latency; no press   Mood & Affect:  steady  congruent and appropriate   Thought Process:  normal and logical   Associations:  intact   Thought Content:  normal, no suicidality, no homicidality, delusions, or paranoia   Insight:  intact   Judgement: behavior is adequate to circumstances   Orientation:  grossly intact   Memory: intact for content of interview   Language: grossly intact   Attention Span & Concentration:  able to focus   Fund of Knowledge:  intact and appropriate to age and level of education     Assessment and Diagnosis   Status/Progress: Based on the examination today, the patient's problem(s) is/are improved.  New problems have not been presented today.   Co-morbidities and Lack of compliance are not complicating management of the primary condition.  There are no active rule-out diagnoses for this patient at this time.     General Impression:       ICD-10-CM ICD-9-CM   1. ADHD (attention deficit hyperactivity disorder), combined type  F90.2 314.01   2. Generalized anxiety disorder with panic attacks  F41.1 300.02    F41.0 300.01       Intervention/Counseling/Treatment Plan   · Medication Management: The risks and benefits of medication were discussed with the patient.  · Continue Adderall 30 mg po BID  · Continue Xanax 0.5 mg po daily PRN panic attacks    Return to Clinic: 3 months    Risks, benefits, side effects and alternative treatments discussed with patient. Patient agrees with the current plan as documented.  Encouraged Patient to keep future appointments.  Take medications as prescribed and abstain from substance abuse.  Pt to present to ED for thoughts to harm herself or others

## 2022-05-18 ENCOUNTER — OFFICE VISIT (OUTPATIENT)
Dept: OBSTETRICS AND GYNECOLOGY | Facility: CLINIC | Age: 37
End: 2022-05-18
Payer: MEDICAID

## 2022-05-18 VITALS
BODY MASS INDEX: 28.71 KG/M2 | SYSTOLIC BLOOD PRESSURE: 128 MMHG | DIASTOLIC BLOOD PRESSURE: 82 MMHG | WEIGHT: 177.88 LBS

## 2022-05-18 DIAGNOSIS — Z80.41 FAMILY HISTORY OF OVARIAN CANCER: ICD-10-CM

## 2022-05-18 DIAGNOSIS — Z01.419 WELL WOMAN EXAM WITH ROUTINE GYNECOLOGICAL EXAM: Primary | ICD-10-CM

## 2022-05-18 DIAGNOSIS — Z11.3 SCREENING FOR STD (SEXUALLY TRANSMITTED DISEASE): ICD-10-CM

## 2022-05-18 DIAGNOSIS — R10.2 PELVIC PAIN IN FEMALE: ICD-10-CM

## 2022-05-18 PROCEDURE — 1160F PR REVIEW ALL MEDS BY PRESCRIBER/CLIN PHARMACIST DOCUMENTED: ICD-10-PCS | Mod: CPTII,,, | Performed by: OBSTETRICS & GYNECOLOGY

## 2022-05-18 PROCEDURE — 4010F ACE/ARB THERAPY RXD/TAKEN: CPT | Mod: CPTII,,, | Performed by: OBSTETRICS & GYNECOLOGY

## 2022-05-18 PROCEDURE — 99395 PREV VISIT EST AGE 18-39: CPT | Mod: S$PBB,,, | Performed by: OBSTETRICS & GYNECOLOGY

## 2022-05-18 PROCEDURE — 99999 PR PBB SHADOW E&M-EST. PATIENT-LVL III: CPT | Mod: PBBFAC,,, | Performed by: OBSTETRICS & GYNECOLOGY

## 2022-05-18 PROCEDURE — 3079F DIAST BP 80-89 MM HG: CPT | Mod: CPTII,,, | Performed by: OBSTETRICS & GYNECOLOGY

## 2022-05-18 PROCEDURE — 3074F PR MOST RECENT SYSTOLIC BLOOD PRESSURE < 130 MM HG: ICD-10-PCS | Mod: CPTII,,, | Performed by: OBSTETRICS & GYNECOLOGY

## 2022-05-18 PROCEDURE — 3008F BODY MASS INDEX DOCD: CPT | Mod: CPTII,,, | Performed by: OBSTETRICS & GYNECOLOGY

## 2022-05-18 PROCEDURE — 99999 PR PBB SHADOW E&M-EST. PATIENT-LVL III: ICD-10-PCS | Mod: PBBFAC,,, | Performed by: OBSTETRICS & GYNECOLOGY

## 2022-05-18 PROCEDURE — 3008F PR BODY MASS INDEX (BMI) DOCUMENTED: ICD-10-PCS | Mod: CPTII,,, | Performed by: OBSTETRICS & GYNECOLOGY

## 2022-05-18 PROCEDURE — 1159F PR MEDICATION LIST DOCUMENTED IN MEDICAL RECORD: ICD-10-PCS | Mod: CPTII,,, | Performed by: OBSTETRICS & GYNECOLOGY

## 2022-05-18 PROCEDURE — 87491 CHLMYD TRACH DNA AMP PROBE: CPT | Mod: 59 | Performed by: OBSTETRICS & GYNECOLOGY

## 2022-05-18 PROCEDURE — 1160F RVW MEDS BY RX/DR IN RCRD: CPT | Mod: CPTII,,, | Performed by: OBSTETRICS & GYNECOLOGY

## 2022-05-18 PROCEDURE — 3079F PR MOST RECENT DIASTOLIC BLOOD PRESSURE 80-89 MM HG: ICD-10-PCS | Mod: CPTII,,, | Performed by: OBSTETRICS & GYNECOLOGY

## 2022-05-18 PROCEDURE — 4010F PR ACE/ARB THEARPY RXD/TAKEN: ICD-10-PCS | Mod: CPTII,,, | Performed by: OBSTETRICS & GYNECOLOGY

## 2022-05-18 PROCEDURE — 3074F SYST BP LT 130 MM HG: CPT | Mod: CPTII,,, | Performed by: OBSTETRICS & GYNECOLOGY

## 2022-05-18 PROCEDURE — 87481 CANDIDA DNA AMP PROBE: CPT | Mod: 59 | Performed by: OBSTETRICS & GYNECOLOGY

## 2022-05-18 PROCEDURE — 99395 PR PREVENTIVE VISIT,EST,18-39: ICD-10-PCS | Mod: S$PBB,,, | Performed by: OBSTETRICS & GYNECOLOGY

## 2022-05-18 PROCEDURE — 99213 OFFICE O/P EST LOW 20 MIN: CPT | Mod: PBBFAC,PO | Performed by: OBSTETRICS & GYNECOLOGY

## 2022-05-18 PROCEDURE — 1159F MED LIST DOCD IN RCRD: CPT | Mod: CPTII,,, | Performed by: OBSTETRICS & GYNECOLOGY

## 2022-05-18 PROCEDURE — 87591 N.GONORRHOEAE DNA AMP PROB: CPT | Mod: 59 | Performed by: OBSTETRICS & GYNECOLOGY

## 2022-05-18 NOTE — PROGRESS NOTES
GYNECOLOGY OFFICE NOTE    Reason for visit: annual    HPI: Pt is a 36 y.o.  female  who presents for annual. Menarche: 16. Cycle: Interval- none since IUD insertion 2017.  She is sexually active- denies discomfort with intercourse.  She does  desire STI screening. She denies vaginal discharge.  Last pap: 2021, denies hx of abnormal. Reports LLQ pain and radiates to back over the last few months.       Past Medical History:   Diagnosis Date    ADHD (attention deficit hyperactivity disorder)     Anxiety     History of psychiatric care     Hypertension     Psychiatric exam requested by authority     Psychiatric problem     Therapy        Past Surgical History:   Procedure Laterality Date     SECTION      DILATION AND CURETTAGE OF UTERUS      missed ab    REPAIR OF LIGAMENT Right     thumb       Family History   Problem Relation Age of Onset    ADD / ADHD Father     Ovarian cancer Mother     Ovarian cancer Maternal Grandmother     Breast cancer Neg Hx     Colon cancer Neg Hx        Social History     Tobacco Use    Smoking status: Current Every Day Smoker    Smokeless tobacco: Never Used   Substance Use Topics    Alcohol use: Yes     Comment: rare drink occasionally    Drug use: No     Comment: has abused THC, LSD, 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    dextroamphetamine-amphetamine (ADDERALL) 30 mg Tab Take 1 tablet (30 mg total) by mouth 2 (two) times daily.    [START ON 2022] dextroamphetamine-amphetamine 30 mg Tab Take 1 tablet (30 mg total) by mouth 2  (two) times daily. (Patient not taking: Reported on 5/18/2022)    [START ON 7/9/2022] dextroamphetamine-amphetamine 30 mg Tab Take 1 tablet (30 mg total) by mouth 2 (two) times daily. (Patient not taking: Reported on 5/18/2022)    ibuprofen (ADVIL,MOTRIN) 800 MG tablet TK 1 T PO BID WF PRF PAIN    losartan (COZAAR) 50 MG tablet losartan 50 mg tablet   Take 1 tablet every day by oral route.     No current facility-administered medications for this visit.       Allergies: Hydrochlorothiazide     /82   Wt 80.7 kg (177 lb 14.4 oz)   LMP  (LMP Unknown)   BMI 28.71 kg/m²     ROS:  GENERAL: Denies fever or chills.   SKIN: Denies rash or lesions.   HEAD: Denies head injury or headache.   CHEST: Denies chest pain or shortness of breath.   CARDIOVASCULAR: Denies palpitations or chest pain.   ABDOMEN: No constipation, diarrhea, nausea, vomiting or rectal bleeding.   URINARY: No dysuria, hematuria, or burning on urination.  REPRODUCTIVE: See HPI.   BREASTS: see HPI  NEUROLOGIC: Denies syncope or weakness.     Physical Exam:  GENERAL: alert, appears stated age and cooperative  NEUROLOGIC: orientated to person, place and time, normal mood and affect   CHEST: Normal respiratory effort  NECK: normal appearance  SKIN: no acne, hirsutism  BREAST EXAM: breasts appear normal, no suspicious masses, no skin or nipple changes or axillary nodes  ABDOMEN: abdomen is soft without significant tenderness, masses  EXTERNAL GENITALIA:  normal general appearance  URETHRA: normal urethra, normal urethral meatus  VAGINA:  normal mucosa, no  lesions  CERVIX:  Normal  UTERUS:  mobile, non tender  ADNEXA: nontender    Diagnosis:  1. Well woman exam with routine gynecological exam    2. Pelvic pain in female    3. Family history of ovarian cancer    4. Screening for STD (sexually transmitted disease)        Plan:   1. Annual  2. F/u pelvic u/s  3. Referral placed  4.  Fu gc/ct and affirm    Orders Placed This Encounter    Vaginosis Screen  by DNA Probe    C. trachomatis/N. gonorrhoeae by AMP DNA [facility]; Cervix    US Pelvis Comp with Transvag NON-OB (xpd    Women's Genetics Request         Carmen Yoder MD  OB/GYN

## 2022-05-19 ENCOUNTER — TELEPHONE (OUTPATIENT)
Dept: ADMINISTRATIVE | Facility: OTHER | Age: 37
End: 2022-05-19
Payer: MEDICAID

## 2022-05-20 LAB
C TRACH DNA SPEC QL NAA+PROBE: NOT DETECTED
N GONORRHOEA DNA SPEC QL NAA+PROBE: NOT DETECTED

## 2022-05-21 LAB
BACTERIAL VAGINOSIS DNA: NEGATIVE
CANDIDA GLABRATA DNA: NEGATIVE
CANDIDA KRUSEI DNA: NEGATIVE
CANDIDA RRNA VAG QL PROBE: NEGATIVE
T VAGINALIS RRNA GENITAL QL PROBE: NEGATIVE

## 2022-05-26 ENCOUNTER — CLINICAL SUPPORT (OUTPATIENT)
Dept: REHABILITATION | Facility: HOSPITAL | Age: 37
End: 2022-05-26
Attending: ORTHOPAEDIC SURGERY
Payer: MEDICAID

## 2022-05-26 DIAGNOSIS — S63.641S: ICD-10-CM

## 2022-05-26 DIAGNOSIS — M25.60 STIFFNESS IN JOINT: ICD-10-CM

## 2022-05-26 DIAGNOSIS — R52 PAIN: Primary | ICD-10-CM

## 2022-05-26 PROCEDURE — L3921 HFO W/JOINT(S) CF: HCPCS | Mod: PN

## 2022-05-26 NOTE — PATIENT INSTRUCTIONS
CUSTOM  ORTHOSIS (SPLINT) INSTRUCTIONS      A CUSTOM SPLINT HAS BEEN FABRICATED FOR YOUR Right Hand.    THE PURPOSE OF YOUR SPLINT IS TO:  - To protect your injury site.      PLEASE WEAR YOUR SPLINT/S AS FOLLOWS:      - Use as needed with pain exacerbations.    REMOVE YOUR SPLINT TO PERFORM THE HOME EXERCISES IF INSTRUCTED.     BRING SPLINT/S TO EACH THERAPY SESSION SO THAT YOUR OCCUPATIONAL THERAPIST CAN PROVIDE MODIFICATIONS AS NEEDED TO:  -  Improve the fit and/or comfort. Monitor effectiveness of the splint.    PLEASE WATCH FOR AND BE AWARE OF THE FOLLOWING:  - Signs of pressure or abrasion that will cause skin irritation or blistering. Broken straps or velcro. Use an ace wrap or tape as needed to secure the splint until it can be repaired.      Keep your orthosis away from any heat sources that may cause it to change shape. Do not leave your orthosis in, near or around the following sources:       Hot water       hand dryers       stove       clothes dryer         radiator     tom car    tom window sill     baggage stored under an airplane            microwave oven    DO NOT alter the SPLINT yourself. It may no longer fit or be effective if it is damaged.    Please contact your Occupational Therapist/Hand Therapist, Shahana Frederick or Occupational Therapist Jhonny Davidson at [phone] if you need to request a splint check. If you have specific questions or concerns, you can message her through the [facility] portal at [email or web address].

## 2022-05-26 NOTE — PLAN OF CARE
OT Orthosis Only    TIME RECORD    Date:  5/26/2022    Start Time:  0842  Stop Time:  0927    PROCEDURES:      UNTIMED  Procedure Min.    -               Total Timed Minutes:  0  Total Timed Units:  0  Total Untimed Units:  1  Charges Billed/# of units:   [id number] 1    Occupational Therapy Orthotic Note    Patient: Terri Summers  MRN: [id number]  Date of visit: 5/26/2022  Referring Physician:  Jorge Luis Lee MD MD ORDERS: One time visit, custom thermoplastic splint  Next MD visit: unknown  Primary Diagnosis: S63.641 - Sprain of metacarpophalangeal joint of right thumb  Treatment Diagnosis:   Encounter Diagnoses   Name Primary?    Pain Yes    Stiffness in joint      DOS - 2/10/21    Subjective:   Patient hand fully healed, but she gets occasional pain.    Objective:     Patient seen by OT this session for fabrication of orthosis per MD orders. Fabricated and applied short thumb opponens splint.  Assessment:     Orthosis with good fit following fabrication. Pt. Able to yvon/doff independently.      Patient Education/Response:   Pt. Instructed to wear continuous, remove for bathing or hygiene. Patient/caregiver were provided written instructions on orthosis purpose, wear schedule, care and precautions to monitor for increased pain/edema, pressure points, skin breakdown or redness/skin irritation. Patient/caregiver to contact clinic for adjustments as needed.       (see Media or Patient Instructions for documents)    Plans and Goals:     Goal of Orthosis to protect injury site     DC with Orthosis. Orthosis Check PRN, f/u with MD at RTD    Patient/caregiver to contact clinic for adjustments as needed.      Jhonny Davidson, OTR/L  Occupational Therapist    I certify the need for these services furnished under this plan of treatment and while under my care    ____________________________________  Physician/Referring Practitioner    _______________  Date of Signature

## 2022-05-26 NOTE — PROGRESS NOTES
[facility] Occupational Therapy Wrist & Hand  Initial Evaluation       Date: 2022  Patient: Terri Summers  Chart Number: [id number]  Referring Physician: Jorge Luis Lee MD  Therapy Diagnosis: No diagnosis found.    Medical Diagnosis: S63.641A (ICD-10-CM) - Traumatic gamekeeper's thumb of right hand  Physician Orders: ***  Evaluation Date: 2022  Insurance Authorization Period Expiration: 22  Plan of Care from 2022 to ***   Surgery Date and Procedure: 2/10/2021:Procedure(s):   1) Repair of right thumb ulnar collateral ligament of MCP joint with bone anchor  2) Application of static forearm splint   Date of Return to MD: To be scheduled    Visit #: *** of 50  Time In: 9:00 am  Time Out: ***  Total Billable Time: ***    Precautions: {IP WOUND PRECAUTIONS OHS:02171}    Subjective     Involved Side: Right  Dominant Side: { hand dominance:1750072607}  Date of Onset: ***  History of Current Condition/Mechanism of Injury: ***  Imaging: none at [facility]  Previous Therapy: OT last visit on 2021 for this injury/repair    Past Medical History/Physical Systems Review:   Terri Summers  has a past medical history of ADHD (attention deficit hyperactivity disorder), Anxiety, History of psychiatric care, Hypertension, Psychiatric exam requested by authority, Psychiatric problem, and Therapy.    Terri Summers  has a past surgical history that includes Dilation and curettage of uterus ();  section; and Repair of ligament (Right).    Terri has a current medication list which includes the following prescription(s): alprazolam, dextroamphetamine-amphetamine, [START ON 2022] dextroamphetamine-amphetamine, [START ON 2022] dextroamphetamine-amphetamine, ibuprofen, and losartan.    Review of patient's allergies indicates:   Allergen Reactions    Hydrochlorothiazide         Patient's Goals for Therapy: [place] to restore pain-free function of the   {Right/Left:94466} UE  for participating in all customary & necessary I/ADLs and work such as ***.  Terri desires to learn the pathology of the condition and how to manage the symptoms. Terri desires to learn adaptive strategies for improved function in *** daily life.    Pain:  Functional Pain Scale Rating 0-10:   {NUMBERS 1-10:17610}/10 on average  {NUMBERS 1-10:37927}/10 at best  {NUMBERS 1-10:73267}/10 at worst  Location: ***  Description: {Pain Description:78529}  Aggravating Factors: {Causes; Pain:29564}  Easing Factors: {Pain (activities that relieve):96716}    Occupation:  ***  Working presently: {Work history:70051}  Duties: ***    Functional Limitations/Social History:    Previous functional status includes: *** Independent with all ADLs.     Current Level of Function ***   Home/Living environment : {LIVES WITH:[address]019}   Limitation of Functional Status as follows:  Basic Self Care: ***  ADLs/IADLs: ***    - Driving: ***   Leisure: {AMB OT HAND LEISURE:15828}     CMS Impairment/Limitation/Restriction for FOTO Hand Survey     Therapist reviewed FOTO scores for Terri on 5/26/2022.   FOTO documents entered into [employer] - see Media section.     Limitation Score: *** (DASH)        Objective     Observations: Incision ***    Sensation: {AMB OT SENSATION:18856} Distribution   IE-*** IE-***    RIGHT LEFT   Monofilament Testing     Normal 1.65-2.83     Diminished Light Touch 3.22-3.61     Diminished Protective 3.84-4.31     Loss of Protective 4.56-6.65     Untestable >6.65       EDEMA (Girth in cm)   Right/Left   DATE IE-***   Wrist    DPC    [facility]    Thumb P1      Wrist AROM   Right/Left   DATE IE-***   EXT    FLX    RD    UD     CALLEJAS      Thumb AROM  (in degrees) Right Left   DATE IE-*** IE-***   MP e/f     IP e/f     RA     PA     CALLEJAS        AROM (measured in centimeters)  Tip to DPC Right/Left   DATE IE-***   Index     Long     Ring      Small      Thumb      DEXTERITY Tests   Right/Left   DATE ***   Purdue 30-sec Test    Grooved Peg  Timed Test        Treatment     Treatment Time In: ***  Treatment Time Out: ***  Total Treatment time (time-based codes) separate from Evaluation: *** minutes    Terri received the following supervised modalities after being cleared for contraindications for *** minutes:   -***    Terri received the following direct contact modalities after being cleared for contraindications for *** minutes:  -***    Terri received the following manual therapy techniques for *** minutes:   - Scar massage provided and instructed for home care  - Retrograde massage provided and instructed for home care  - fit and issued size *** 3/4 finger compression glove; precautions discussed  - ***elastic tape for space correction at *** for reducing pain and promoting healing. Precautions were discussed in detail.     Terri performed therapeutic exercises for *** minutes including:  - HEP instructed, illustrated and return demonstrated                            Terri participated in a concurrent discussion of evaluation findings and specific home care, including:  - Current MD/PA orders, Protocol Restrictions and precautions  - Incision site care  - the anatomy and pathophysiology of surgery/diagnosis.  - instruction in activity modifications for goals of protecting healing tissues to include ***.    Home Exercise Program and Home Care Resources/Education:  Issued HEP (see patient instructions in EMR) and educated on modality use for pain management . Exercises were reviewed and Terri was able to demonstrate them prior to the end of the session.   Pt received a written copy of exercises to perform at home. Terri demonstrated {Desc; good/fair/poor:55919} understanding of the education provided.  Pt was advised to perform these exercises free of pain, and to stop performing them if pain occurs.    Patient/Family Education: role of OT, goals for OT, scheduling/cancellations - pt verbalized understanding. Discussed insurance limitations with  patient.    Assessment     Terri Summers is a 36 y.o. female presents with limitations as described in problem list. Patient can benefit from skilled Occupational Therapy services. The following goals were discussed with the patient and Terri Summers is in agreement with them as to be addressed in the treatment plan.    The patient's rehab potential is {REHAB PROGNOSIS OHS:05908}.     Anticipated barriers to occupational therapy: ***  Pt has no cultural, educational or language barriers to learning provided.    Profile and History Assessment of Occupational Performance Level of Clinical Decision Making Complexity Score   Occupational Profile:   Terri Summers is a 36 y.o. female who is {DESC; EMPLOYMENT STATUS:83250} Terri Summers has difficulty with  ADLs and IADLs as listed previously, which  affecting his/her daily functional abilities.      Comorbidities:    has a past medical history of ADHD (attention deficit hyperactivity disorder), Anxiety, History of psychiatric care, Hypertension, Psychiatric exam requested by authority, Psychiatric problem, and Therapy.    Medical and Therapy History Review:   {History Review:56290}               Performance Deficits    Physical:  {Physical:74223}    Cognitive:  {Cognitive:72008}    Psychosocial:    {Psychosocial:32622}     Clinical Decision Making:  {Desc; low/moderate/high:574406}    Assessment Process:  {Assessment Complexity:95339}    Modification/Need for Assistance:  {Modification:73530}    Intervention Selection:  {Treatment Options:41121}       {Desc; low/moderate/high:267499}  Based on PMHX, co morbidities , data from assessments and functional level of assistance required with task and clinical presentation directly impacting function.       The following goals were discussed with the patient and patient is in agreement with them as to be addressed in the treatment plan.     Goals:    LTG's (6-8 weeks):  1)   Decrease complaints of pain to *** out of 10 at worst  "to increase functional hand use for ADL/work/leisure activities.  2)   *** Fine motor skills to be >***% of the unaffected hand for manipulating small fasteners and other self care items.  4)   ***  strength  to be > 80% of the unaffected hand for safe grasp and stabilization tasks.  5)   *** pinch strength to be >60% of the unaffected hand for managing all fasteners, locks & latches needed for self care and home management.  6)  Patient to score at ***% or less on FOTO or Quick/DASH to demonstrate improved perception of functional *** hand/UE use.     STG's (*** weeks)  1)  Pt to be (I) with orthosis, initial HEP and pain/edema mgmt  2)  Decrease complaints of pain to *** out of 10 at worst to increase functional hand use for ADL/work/leisure activities.    3)  Thumb MPJ to be absent of instability and/or lateral deviation to indicate effective healing and appropriate protective postures.  4)   *** CALLEJAS to be >*** degrees to improve functional grasp for ADLs/work/leisure activities.   5)  Pt to tolerate advancing PREs and weight bearing with self-graded intensity and effective symptom monitoring.   6)  Pt to report decreased episodes of abnormal sensation in *** hand upon waking or during daytime positions.    Plan   Certification Period/Plan of care expiration: 5/26/2022 to ***.    Outpatient Occupational Therapy {NUMBERS 1-5:78252} times weekly for {0-10:75649::"0"} weeks to include the following interventions: {AMB OT HAND TX:44041}.    Shahana Frederick OT, CHT  Occupational Therapist, Certified Hand Therapist        "

## 2022-06-07 ENCOUNTER — PATIENT MESSAGE (OUTPATIENT)
Dept: PSYCHIATRY | Facility: CLINIC | Age: 37
End: 2022-06-07
Payer: MEDICAID

## 2022-07-08 ENCOUNTER — PATIENT MESSAGE (OUTPATIENT)
Dept: PSYCHIATRY | Facility: CLINIC | Age: 37
End: 2022-07-08
Payer: MEDICAID

## 2022-07-12 ENCOUNTER — PATIENT MESSAGE (OUTPATIENT)
Dept: PSYCHIATRY | Facility: CLINIC | Age: 37
End: 2022-07-12
Payer: MEDICAID

## 2022-07-13 DIAGNOSIS — F90.2 ADHD (ATTENTION DEFICIT HYPERACTIVITY DISORDER), COMBINED TYPE: ICD-10-CM

## 2022-07-13 RX ORDER — DEXTROAMPHETAMINE SACCHARATE, AMPHETAMINE ASPARTATE, DEXTROAMPHETAMINE SULFATE AND AMPHETAMINE SULFATE 7.5; 7.5; 7.5; 7.5 MG/1; MG/1; MG/1; MG/1
30 TABLET ORAL 2 TIMES DAILY
Qty: 60 TABLET | Refills: 0 | Status: SHIPPED | OUTPATIENT
Start: 2022-07-13 | End: 2022-07-25 | Stop reason: SDUPTHER

## 2022-07-25 ENCOUNTER — OFFICE VISIT (OUTPATIENT)
Dept: PSYCHIATRY | Facility: CLINIC | Age: 37
End: 2022-07-25
Payer: MEDICAID

## 2022-07-25 DIAGNOSIS — F41.1 GENERALIZED ANXIETY DISORDER WITH PANIC ATTACKS: ICD-10-CM

## 2022-07-25 DIAGNOSIS — F90.2 ADHD (ATTENTION DEFICIT HYPERACTIVITY DISORDER), COMBINED TYPE: Primary | ICD-10-CM

## 2022-07-25 DIAGNOSIS — F41.0 GENERALIZED ANXIETY DISORDER WITH PANIC ATTACKS: ICD-10-CM

## 2022-07-25 PROCEDURE — 4010F ACE/ARB THERAPY RXD/TAKEN: CPT | Mod: SA,HB,CPTII,95 | Performed by: NURSE PRACTITIONER

## 2022-07-25 PROCEDURE — 99213 OFFICE O/P EST LOW 20 MIN: CPT | Mod: SA,HB,95, | Performed by: NURSE PRACTITIONER

## 2022-07-25 PROCEDURE — 4010F PR ACE/ARB THEARPY RXD/TAKEN: ICD-10-PCS | Mod: SA,HB,CPTII,95 | Performed by: NURSE PRACTITIONER

## 2022-07-25 PROCEDURE — 99213 PR OFFICE/OUTPT VISIT, EST, LEVL III, 20-29 MIN: ICD-10-PCS | Mod: SA,HB,95, | Performed by: NURSE PRACTITIONER

## 2022-07-25 RX ORDER — DEXTROAMPHETAMINE SACCHARATE, AMPHETAMINE ASPARTATE, DEXTROAMPHETAMINE SULFATE AND AMPHETAMINE SULFATE 7.5; 7.5; 7.5; 7.5 MG/1; MG/1; MG/1; MG/1
30 TABLET ORAL 2 TIMES DAILY
Qty: 60 TABLET | Refills: 0 | Status: SHIPPED | OUTPATIENT
Start: 2022-08-12 | End: 2022-09-08 | Stop reason: SDUPTHER

## 2022-07-25 RX ORDER — DEXTROAMPHETAMINE SACCHARATE, AMPHETAMINE ASPARTATE, DEXTROAMPHETAMINE SULFATE AND AMPHETAMINE SULFATE 7.5; 7.5; 7.5; 7.5 MG/1; MG/1; MG/1; MG/1
30 TABLET ORAL 2 TIMES DAILY
Qty: 60 TABLET | Refills: 0 | Status: SHIPPED | OUTPATIENT
Start: 2022-10-12 | End: 2022-09-30 | Stop reason: SDUPTHER

## 2022-07-25 RX ORDER — DEXTROAMPHETAMINE SACCHARATE, AMPHETAMINE ASPARTATE, DEXTROAMPHETAMINE SULFATE AND AMPHETAMINE SULFATE 7.5; 7.5; 7.5; 7.5 MG/1; MG/1; MG/1; MG/1
30 TABLET ORAL 2 TIMES DAILY
Qty: 60 TABLET | Refills: 0 | Status: SHIPPED | OUTPATIENT
Start: 2022-09-12 | End: 2022-09-30 | Stop reason: SDUPTHER

## 2022-07-25 NOTE — PROGRESS NOTES
Outpatient Psychiatry Follow-Up Visit (MD/NP)    7/25/2022    Clinical Status of Patient:  Outpatient (Ambulatory)    Chief Complaint:  Terri Summers is a 36 y.o. female who presents today for follow-up of attention problems.  Met with patient.      Last visit was: 5/09/22  The patient location is: home  The chief complaint leading to consultation is: attention problems    Visit type: audiovisual    Face to Face time with patient: 30 minutes  32 minutes of total time spent on the encounter, which includes face to face time and non-face to face time preparing to see the patient (eg, review of tests), Obtaining and/or reviewing separately obtained history, Documenting clinical information in the electronic or other health record, Independently interpreting results (not separately reported) and communicating results to the patient/family/caregiver, or Care coordination (not separately reported).     Each patient to whom he or she provides medical services by telemedicine is:  (1) informed of the relationship between the physician and patient and the respective role of any other health care provider with respect to management of the patient; and (2) notified that he or she may decline to receive medical services by telemedicine and may withdraw from such care at any time.    Interval History and Content of Current Session:  Current Psychiatric Medications/changes  · Continue Adderall 30 mg po BID  · Continue Xanax 0.5 mg po daily PRN panic attacks    Virtual Visit: Pt presents bright affect and euthymic mood. Reports improvement in anxiety and ADH symptoms with current medications. Reports that she is doing well. Denies SI/HI/AVH. Will continue meds    Psychotherapy:  · Target symptoms: distractability, lack of focus  · Why chosen therapy is appropriate versus another modality: relevant to diagnosis  · Outcome monitoring methods: self-report  · Therapeutic intervention type: insight oriented psychotherapy  · Topics  discussed/themes: building skills sets for symptom management, symptom recognition  · The patient's response to the intervention is accepting. The patient's progress toward treatment goals is good.   · Duration of intervention: 14 minutes.    Review of Systems   · PSYCHIATRIC: Pertinant items are noted in the narrative.  · CONSTITUTIONAL: No weight gain or loss.   · MUSCULOSKELETAL: No pain or stiffness of the joints.  · NEUROLOGIC: No weakness, sensory changes, seizures, confusion, memory loss, tremor or other abnormal movements.  · ENDOCRINE: No polydipsia or polyuria.  · INTEGUMENTARY: No rashes or lacerations.  · EYES: No exophthalmos, jaundice or blindness.  · ENT: No dizziness, tinnitus or hearing loss.  · RESPIRATORY: No shortness of breath.  · CARDIOVASCULAR: No tachycardia or chest pain.  · GASTROINTESTINAL: No nausea, vomiting, pain, constipation or diarrhea.  · GENITOURINARY: No frequency, dysuria or sexual dysfunction.  · HEMATOLOGIC/LYMPHATIC: No excessive bleeding, prolonged or excessive bleeding after dental extraction/injury.  · ALLERGIC/IMMUNOLOGIC: No allergic response to materials, foods or animals at this time.    Past Medical, Family and Social History: The patient's past medical, family and social history have been reviewed and updated as appropriate within the electronic medical record - see encounter notes.    Compliance: no    Side effects: None    Risk Parameters:  Patient reports no suicidal ideation  Patient reports no homicidal ideation  Patient reports no self-injurious behavior  Patient reports no violent behavior    Exam (detailed: at least 9 elements; comprehensive: all 15 elements)   Constitutional  Vitals:  Most recent vital signs, dated greater than 90 days prior to this appointment, were not reviewed.   There were no vitals filed for this visit.     General:  unremarkable, age appropriate     Musculoskeletal  Muscle Strength/Tone:  no tremor   Gait & Station:  non-ataxic      Psychiatric  Speech:  no latency; no press   Mood & Affect:  steady  congruent and appropriate   Thought Process:  normal and logical   Associations:  intact   Thought Content:  normal, no suicidality, no homicidality, delusions, or paranoia   Insight:  intact   Judgement: behavior is adequate to circumstances   Orientation:  grossly intact   Memory: intact for content of interview   Language: grossly intact   Attention Span & Concentration:  able to focus   Fund of Knowledge:  intact and appropriate to age and level of education     Assessment and Diagnosis   Status/Progress: Based on the examination today, the patient's problem(s) is/are improved.  New problems have not been presented today.   Co-morbidities and Lack of compliance are not complicating management of the primary condition.  There are no active rule-out diagnoses for this patient at this time.     General Impression:       ICD-10-CM ICD-9-CM   1. ADHD (attention deficit hyperactivity disorder), combined type  F90.2 314.01   2. Generalized anxiety disorder with panic attacks  F41.1 300.02    F41.0 300.01       Intervention/Counseling/Treatment Plan   · Medication Management: The risks and benefits of medication were discussed with the patient.  · Continue Adderall 30 mg po BID  · Continue Xanax 0.5 mg po daily PRN panic attacks    Return to Clinic: 3 months    Risks, benefits, side effects and alternative treatments discussed with patient. Patient agrees with the current plan as documented.  Encouraged Patient to keep future appointments.  Take medications as prescribed and abstain from substance abuse.  Pt to present to ED for thoughts to harm herself or others

## 2022-09-08 ENCOUNTER — PATIENT MESSAGE (OUTPATIENT)
Dept: PSYCHIATRY | Facility: CLINIC | Age: 37
End: 2022-09-08
Payer: MEDICAID

## 2022-09-08 DIAGNOSIS — F90.2 ADHD (ATTENTION DEFICIT HYPERACTIVITY DISORDER), COMBINED TYPE: ICD-10-CM

## 2022-09-08 RX ORDER — DEXTROAMPHETAMINE SACCHARATE, AMPHETAMINE ASPARTATE, DEXTROAMPHETAMINE SULFATE AND AMPHETAMINE SULFATE 7.5; 7.5; 7.5; 7.5 MG/1; MG/1; MG/1; MG/1
30 TABLET ORAL 2 TIMES DAILY
Qty: 60 TABLET | Refills: 0 | Status: SHIPPED | OUTPATIENT
Start: 2022-09-08 | End: 2022-09-08 | Stop reason: SDUPTHER

## 2022-09-08 RX ORDER — DEXTROAMPHETAMINE SACCHARATE, AMPHETAMINE ASPARTATE, DEXTROAMPHETAMINE SULFATE AND AMPHETAMINE SULFATE 7.5; 7.5; 7.5; 7.5 MG/1; MG/1; MG/1; MG/1
30 TABLET ORAL 2 TIMES DAILY
Qty: 60 TABLET | Refills: 0 | Status: SHIPPED | OUTPATIENT
Start: 2022-09-08 | End: 2022-09-30 | Stop reason: SDUPTHER

## 2022-09-30 ENCOUNTER — OFFICE VISIT (OUTPATIENT)
Dept: PSYCHIATRY | Facility: CLINIC | Age: 37
End: 2022-09-30
Payer: MEDICAID

## 2022-09-30 DIAGNOSIS — F41.0 GENERALIZED ANXIETY DISORDER WITH PANIC ATTACKS: Primary | ICD-10-CM

## 2022-09-30 DIAGNOSIS — F90.2 ADHD (ATTENTION DEFICIT HYPERACTIVITY DISORDER), COMBINED TYPE: ICD-10-CM

## 2022-09-30 DIAGNOSIS — F41.1 GENERALIZED ANXIETY DISORDER WITH PANIC ATTACKS: Primary | ICD-10-CM

## 2022-09-30 PROCEDURE — 1160F PR REVIEW ALL MEDS BY PRESCRIBER/CLIN PHARMACIST DOCUMENTED: ICD-10-PCS | Mod: SA,HB,CPTII,95 | Performed by: NURSE PRACTITIONER

## 2022-09-30 PROCEDURE — 1160F RVW MEDS BY RX/DR IN RCRD: CPT | Mod: SA,HB,CPTII,95 | Performed by: NURSE PRACTITIONER

## 2022-09-30 PROCEDURE — 1159F PR MEDICATION LIST DOCUMENTED IN MEDICAL RECORD: ICD-10-PCS | Mod: SA,HB,CPTII,95 | Performed by: NURSE PRACTITIONER

## 2022-09-30 PROCEDURE — 4010F PR ACE/ARB THEARPY RXD/TAKEN: ICD-10-PCS | Mod: SA,HB,CPTII,95 | Performed by: NURSE PRACTITIONER

## 2022-09-30 PROCEDURE — 99213 PR OFFICE/OUTPT VISIT, EST, LEVL III, 20-29 MIN: ICD-10-PCS | Mod: SA,HB,95, | Performed by: NURSE PRACTITIONER

## 2022-09-30 PROCEDURE — 99213 OFFICE O/P EST LOW 20 MIN: CPT | Mod: SA,HB,95, | Performed by: NURSE PRACTITIONER

## 2022-09-30 PROCEDURE — 1159F MED LIST DOCD IN RCRD: CPT | Mod: SA,HB,CPTII,95 | Performed by: NURSE PRACTITIONER

## 2022-09-30 PROCEDURE — 4010F ACE/ARB THERAPY RXD/TAKEN: CPT | Mod: SA,HB,CPTII,95 | Performed by: NURSE PRACTITIONER

## 2022-09-30 RX ORDER — DEXTROAMPHETAMINE SACCHARATE, AMPHETAMINE ASPARTATE, DEXTROAMPHETAMINE SULFATE AND AMPHETAMINE SULFATE 7.5; 7.5; 7.5; 7.5 MG/1; MG/1; MG/1; MG/1
30 TABLET ORAL 2 TIMES DAILY
Qty: 60 TABLET | Refills: 0 | Status: SHIPPED | OUTPATIENT
Start: 2022-12-06 | End: 2023-02-20 | Stop reason: SDUPTHER

## 2022-09-30 RX ORDER — DEXTROAMPHETAMINE SACCHARATE, AMPHETAMINE ASPARTATE, DEXTROAMPHETAMINE SULFATE AND AMPHETAMINE SULFATE 7.5; 7.5; 7.5; 7.5 MG/1; MG/1; MG/1; MG/1
30 TABLET ORAL 2 TIMES DAILY
Qty: 60 TABLET | Refills: 0 | Status: SHIPPED | OUTPATIENT
Start: 2022-11-06 | End: 2022-11-23 | Stop reason: SDUPTHER

## 2022-09-30 RX ORDER — ALPRAZOLAM 0.5 MG/1
0.5 TABLET ORAL DAILY PRN
Qty: 30 TABLET | Refills: 5 | Status: SHIPPED | OUTPATIENT
Start: 2022-09-30 | End: 2023-02-20 | Stop reason: SDUPTHER

## 2022-09-30 RX ORDER — DEXTROAMPHETAMINE SACCHARATE, AMPHETAMINE ASPARTATE, DEXTROAMPHETAMINE SULFATE AND AMPHETAMINE SULFATE 7.5; 7.5; 7.5; 7.5 MG/1; MG/1; MG/1; MG/1
30 TABLET ORAL 2 TIMES DAILY
Qty: 60 TABLET | Refills: 0 | Status: SHIPPED | OUTPATIENT
Start: 2022-10-06 | End: 2022-11-01 | Stop reason: SDUPTHER

## 2022-09-30 NOTE — PROGRESS NOTES
Outpatient Psychiatry Follow-Up Visit (MD/NP)    9/30/2022    Clinical Status of Patient:  Outpatient (Ambulatory)    Chief Complaint:  Terri Summers is a 37 y.o. female who presents today for follow-up of attention problems.  Met with patient.      Last visit was: 7/25/22  The patient location is: home  The chief complaint leading to consultation is: attention problems    Visit type: audiovisual    Face to Face time with patient: 30 minutes  32 minutes of total time spent on the encounter, which includes face to face time and non-face to face time preparing to see the patient (eg, review of tests), Obtaining and/or reviewing separately obtained history, Documenting clinical information in the electronic or other health record, Independently interpreting results (not separately reported) and communicating results to the patient/family/caregiver, or Care coordination (not separately reported).     Each patient to whom he or she provides medical services by telemedicine is:  (1) informed of the relationship between the physician and patient and the respective role of any other health care provider with respect to management of the patient; and (2) notified that he or she may decline to receive medical services by telemedicine and may withdraw from such care at any time.    Interval History and Content of Current Session:  Current Psychiatric Medications/changes  Continue Adderall 30 mg po BID  Continue Xanax 0.5 mg po daily PRN panic attacks    Virtual Visit: Pt presents bright affect and euthymic mood. Reports improvement in anxiety and ADH symptoms with current medications. Reports that she is doing well. Denies SI/HI/AVH. Will continue meds    Psychotherapy:  Target symptoms: distractability, lack of focus  Why chosen therapy is appropriate versus another modality: relevant to diagnosis  Outcome monitoring methods: self-report  Therapeutic intervention type: insight oriented psychotherapy  Topics discussed/themes:  building skills sets for symptom management, symptom recognition  The patient's response to the intervention is accepting. The patient's progress toward treatment goals is good.   Duration of intervention: 14 minutes.    Review of Systems   PSYCHIATRIC: Pertinant items are noted in the narrative.  CONSTITUTIONAL: No weight gain or loss.   MUSCULOSKELETAL: No pain or stiffness of the joints.  NEUROLOGIC: No weakness, sensory changes, seizures, confusion, memory loss, tremor or other abnormal movements.  ENDOCRINE: No polydipsia or polyuria.  INTEGUMENTARY: No rashes or lacerations.  EYES: No exophthalmos, jaundice or blindness.  ENT: No dizziness, tinnitus or hearing loss.  RESPIRATORY: No shortness of breath.  CARDIOVASCULAR: No tachycardia or chest pain.  GASTROINTESTINAL: No nausea, vomiting, pain, constipation or diarrhea.  GENITOURINARY: No frequency, dysuria or sexual dysfunction.  HEMATOLOGIC/LYMPHATIC: No excessive bleeding, prolonged or excessive bleeding after dental extraction/injury.  ALLERGIC/IMMUNOLOGIC: No allergic response to materials, foods or animals at this time.    Past Medical, Family and Social History: The patient's past medical, family and social history have been reviewed and updated as appropriate within the electronic medical record - see encounter notes.    Compliance: no    Side effects: None    Risk Parameters:  Patient reports no suicidal ideation  Patient reports no homicidal ideation  Patient reports no self-injurious behavior  Patient reports no violent behavior    Exam (detailed: at least 9 elements; comprehensive: all 15 elements)   Constitutional  Vitals:  Most recent vital signs, dated greater than 90 days prior to this appointment, were not reviewed.   There were no vitals filed for this visit.     General:  unremarkable, age appropriate     Musculoskeletal  Muscle Strength/Tone:  no tremor   Gait & Station:  non-ataxic     Psychiatric  Speech:  no latency; no press   Mood &  Affect:  steady  congruent and appropriate   Thought Process:  normal and logical   Associations:  intact   Thought Content:  normal, no suicidality, no homicidality, delusions, or paranoia   Insight:  intact   Judgement: behavior is adequate to circumstances   Orientation:  grossly intact   Memory: intact for content of interview   Language: grossly intact   Attention Span & Concentration:  able to focus   Fund of Knowledge:  intact and appropriate to age and level of education     Assessment and Diagnosis   Status/Progress: Based on the examination today, the patient's problem(s) is/are improved.  New problems have not been presented today.   Co-morbidities and Lack of compliance are not complicating management of the primary condition.  There are no active rule-out diagnoses for this patient at this time.     General Impression:       ICD-10-CM ICD-9-CM   1. Generalized anxiety disorder with panic attacks  F41.1 300.02    F41.0 300.01   2. ADHD (attention deficit hyperactivity disorder), combined type  F90.2 314.01     Intervention/Counseling/Treatment Plan   Medication Management: The risks and benefits of medication were discussed with the patient.  Continue Adderall 30 mg po BID  Continue Xanax 0.5 mg po daily PRN panic attacks    Return to Clinic: 3 months    Risks, benefits, side effects and alternative treatments discussed with patient. Patient agrees with the current plan as documented.  Encouraged Patient to keep future appointments.  Take medications as prescribed and abstain from substance abuse.  Pt to present to ED for thoughts to harm herself or others

## 2022-10-14 DIAGNOSIS — M25.561 RIGHT KNEE PAIN, UNSPECIFIED CHRONICITY: Primary | ICD-10-CM

## 2022-10-18 ENCOUNTER — HOSPITAL ENCOUNTER (OUTPATIENT)
Dept: RADIOLOGY | Facility: HOSPITAL | Age: 37
Discharge: HOME | End: 2022-10-18
Attending: ORTHOPAEDIC SURGERY
Payer: MEDICAID

## 2022-10-18 ENCOUNTER — OFFICE VISIT (OUTPATIENT)
Dept: ORTHOPEDICS | Facility: CLINIC | Age: 37
End: 2022-10-18
Payer: MEDICAID

## 2022-10-18 VITALS
SYSTOLIC BLOOD PRESSURE: 137 MMHG | WEIGHT: 181.69 LBS | HEIGHT: 66 IN | BODY MASS INDEX: 29.2 KG/M2 | HEART RATE: 114 BPM | DIASTOLIC BLOOD PRESSURE: 71 MMHG

## 2022-10-18 DIAGNOSIS — S83.004A: Primary | ICD-10-CM

## 2022-10-18 DIAGNOSIS — M25.561 RIGHT KNEE PAIN, UNSPECIFIED CHRONICITY: ICD-10-CM

## 2022-10-18 PROCEDURE — 99213 OFFICE O/P EST LOW 20 MIN: CPT | Mod: PBBFAC,PN | Performed by: ORTHOPAEDIC SURGERY

## 2022-10-18 PROCEDURE — 1159F MED LIST DOCD IN RCRD: CPT | Mod: CPTII,,, | Performed by: ORTHOPAEDIC SURGERY

## 2022-10-18 PROCEDURE — 3078F PR MOST RECENT DIASTOLIC BLOOD PRESSURE < 80 MM HG: ICD-10-PCS | Mod: CPTII,,, | Performed by: ORTHOPAEDIC SURGERY

## 2022-10-18 PROCEDURE — 3078F DIAST BP <80 MM HG: CPT | Mod: CPTII,,, | Performed by: ORTHOPAEDIC SURGERY

## 2022-10-18 PROCEDURE — 1160F RVW MEDS BY RX/DR IN RCRD: CPT | Mod: CPTII,,, | Performed by: ORTHOPAEDIC SURGERY

## 2022-10-18 PROCEDURE — 1160F PR REVIEW ALL MEDS BY PRESCRIBER/CLIN PHARMACIST DOCUMENTED: ICD-10-PCS | Mod: CPTII,,, | Performed by: ORTHOPAEDIC SURGERY

## 2022-10-18 PROCEDURE — 99204 PR OFFICE/OUTPT VISIT, NEW, LEVL IV, 45-59 MIN: ICD-10-PCS | Mod: S$PBB,,, | Performed by: ORTHOPAEDIC SURGERY

## 2022-10-18 PROCEDURE — 99999 PR PBB SHADOW E&M-EST. PATIENT-LVL III: CPT | Mod: PBBFAC,,, | Performed by: ORTHOPAEDIC SURGERY

## 2022-10-18 PROCEDURE — 73564 X-RAY EXAM KNEE 4 OR MORE: CPT | Mod: TC,FY,RT

## 2022-10-18 PROCEDURE — 4010F PR ACE/ARB THEARPY RXD/TAKEN: ICD-10-PCS | Mod: CPTII,,, | Performed by: ORTHOPAEDIC SURGERY

## 2022-10-18 PROCEDURE — 73564 X-RAY EXAM KNEE 4 OR MORE: CPT | Mod: 26,RT,, | Performed by: RADIOLOGY

## 2022-10-18 PROCEDURE — 3075F PR MOST RECENT SYSTOLIC BLOOD PRESS GE 130-139MM HG: ICD-10-PCS | Mod: CPTII,,, | Performed by: ORTHOPAEDIC SURGERY

## 2022-10-18 PROCEDURE — 73564 XR KNEE COMP 4 OR MORE VIEWS RIGHT: ICD-10-PCS | Mod: 26,RT,, | Performed by: RADIOLOGY

## 2022-10-18 PROCEDURE — 3075F SYST BP GE 130 - 139MM HG: CPT | Mod: CPTII,,, | Performed by: ORTHOPAEDIC SURGERY

## 2022-10-18 PROCEDURE — 1159F PR MEDICATION LIST DOCUMENTED IN MEDICAL RECORD: ICD-10-PCS | Mod: CPTII,,, | Performed by: ORTHOPAEDIC SURGERY

## 2022-10-18 PROCEDURE — 99204 OFFICE O/P NEW MOD 45 MIN: CPT | Mod: S$PBB,,, | Performed by: ORTHOPAEDIC SURGERY

## 2022-10-18 PROCEDURE — 99999 PR PBB SHADOW E&M-EST. PATIENT-LVL III: ICD-10-PCS | Mod: PBBFAC,,, | Performed by: ORTHOPAEDIC SURGERY

## 2022-10-18 PROCEDURE — 4010F ACE/ARB THERAPY RXD/TAKEN: CPT | Mod: CPTII,,, | Performed by: ORTHOPAEDIC SURGERY

## 2022-10-18 RX ORDER — PREDNISONE 20 MG/1
20 TABLET ORAL 2 TIMES DAILY
COMMUNITY
Start: 2022-07-10 | End: 2022-12-08

## 2022-10-18 RX ORDER — FLUCONAZOLE 150 MG/1
TABLET ORAL
COMMUNITY
Start: 2022-07-10 | End: 2022-12-08

## 2022-10-18 RX ORDER — ERGOCALCIFEROL 1.25 MG/1
50000 CAPSULE ORAL
COMMUNITY
Start: 2022-07-27

## 2022-10-18 NOTE — PROGRESS NOTES
Patient ID:   Terri Summers is a 37 y.o. female.    Chief Complaint:   Right kneecap dislocation    HPI:   The patient is a 37-year-old female who is presenting today after dislocating her right kneecap.  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  In July, she twisted her right knee in a swimming pool and felt kneecap dislocate.  She had another event in September with the knee cap dislocated again.  She was put into a brace.  Past treatment has included bracing and physical therapy.  She reports pain along the medial aspect of her right knee.  Pain level today is 7/10    Medications:    Current Outpatient Medications:     ALPRAZolam (XANAX) 0.5 MG tablet, Take 1 tablet (0.5 mg total) by mouth daily as needed for Anxiety., Disp: 30 tablet, Rfl: 5    [START ON 11/6/2022] dextroamphetamine-amphetamine (ADDERALL) 30 mg Tab, Take 1 tablet (30 mg total) by mouth 2 (two) times daily., Disp: 60 tablet, Rfl: 0    dextroamphetamine-amphetamine 30 mg Tab, Take 1 tablet (30 mg total) by mouth 2 (two) times daily., Disp: 60 tablet, Rfl: 0    [START ON 12/6/2022] dextroamphetamine-amphetamine 30 mg Tab, Take 1 tablet (30 mg total) by mouth 2 (two) times daily., Disp: 60 tablet, Rfl: 0    ergocalciferol (ERGOCALCIFEROL) 50,000 unit Cap, Take 50,000 Units by mouth every 7 days., Disp: , Rfl:     fluconazole (DIFLUCAN) 150 MG Tab, TAKE 1 TABLET BY MOUTH 1 TIME FOR 1 DOSE AS NEEDED FOR YEAST INFECTION, Disp: , Rfl:     ibuprofen (ADVIL,MOTRIN) 800 MG tablet, TK 1 T PO BID WF PRF PAIN, Disp: , Rfl: 0    losartan (COZAAR) 50 MG tablet, losartan 50 mg tablet  Take 1 tablet every day by oral route., Disp: , Rfl:     predniSONE (DELTASONE) 20 MG tablet, Take 20 mg by mouth 2 (two) times daily., Disp: , Rfl:     Allergies:  Review of patient's allergies indicates:   Allergen Reactions    Hydrochlorothiazide        Past Medical History:  Past Medical History:   Diagnosis Date    ADHD (attention deficit  "hyperactivity disorder)     Anxiety     History of psychiatric care     Hypertension     Psychiatric exam requested by authority     Psychiatric problem     Therapy         Past Surgical History:  Past Surgical History:   Procedure Laterality Date     SECTION      DILATION AND CURETTAGE OF UTERUS  2008    missed ab    REPAIR OF LIGAMENT Right     thumb       Social History:  Social History     Occupational History    Not on file   Tobacco Use    Smoking status: Every Day    Smokeless tobacco: Never   Substance and Sexual Activity    Alcohol use: Yes     Comment: rare drink occasionally    Drug use: No     Comment: has abused THC, LSD, 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       Family History:  Family History   Problem Relation Age of Onset    ADD / ADHD Father     Ovarian cancer Mother     Ovarian cancer Maternal Grandmother     Breast cancer Neg Hx     Colon cancer Neg Hx         ROS:  Review of Systems   Musculoskeletal:  Positive for joint pain and myalgias.   Neurological:  Negative for numbness and paresthesias.   All other systems reviewed and are negative.    Vitals:  /71 (BP Location: Left arm, Patient Position: Sitting, BP Method: Small (Automatic))   Pulse (!) 114   Ht 5' 6" (1.676 m)   Wt 82.4 kg (181 lb 10.5 oz)   BMI 29.32 kg/m²     Physical Examination:  Comprehensive Orthopaedic Musculoskeletal Exam    General      Constitutional: appears stated age, well-developed and well-nourished    Scleral icterus: no    Labored breathing: no    Psychiatric: normal mood and affect and no acute distress    Neurological: alert and oriented x3    Skin: intact    Lymphadenopathy: none   Ortho Exam   Right knee exam:   Positive effusion.  Tenderness along the medial portion of the patella as well as the medial retinaculum.  Mild tenderness over the lateral femoral condyle.    Range of motion reveals about 8° of hyperextension " bilaterally and flexion the right side to 110.  Positive patellar apprehension.  Lateral patellar tilt to neutral.  No medial or lateral joint line tenderness.    Negative Edison's.    No varus or valgus instability.  Negative Lachman's and posterior drawer.      Beighton score is 6.    Imaging:  I have ordered and independently reviewed the following imaging studies performed at [facility] today    Jose-Trena ratio = 0.91    Lateral view is not a true lateral view. However, she may have some dysplasia given crossing sign and double contour line.    X-Ray Knee Complete 4 Or More Views Right  Narrative: EXAMINATION:  XR KNEE COMP 4 OR MORE VIEWS RIGHT    CLINICAL HISTORY:  Pain in right knee    TECHNIQUE:  AP and lateral view, AP flexion and patellar view    COMPARISON:  09/22/2006    FINDINGS:  Normal tibiofemoral joint space.  No significant degenerative change.  Normal femoral patellar joint space.  No significant degenerative change at the femoral patellar joint.  There is a small joint effusion.  No fracture, no osseous lesions.  The soft tissues appear normal.  Impression: Small joint effusion.    Electronically signed by: Bonny Shukla MD  Date:    10/18/2022  Time:    10:58    Assessment:  1. Traumatic dislocation of patella, right, initial encounter      Plan:  I have reviewed the clinical and radiographic findings in detail with Mrs. Summers. Given her effusion and recent patellar dislocation, I have recommended MRI exam to further evaluate her knee. I will contact her after the MRI is completed to discuss potential surgical options.     Orders Placed This Encounter    HME - OTHER    MRI Knee Without Contrast Right     No follow-ups on file.

## 2022-10-26 ENCOUNTER — PATIENT MESSAGE (OUTPATIENT)
Dept: ORTHOPEDICS | Facility: CLINIC | Age: 37
End: 2022-10-26
Payer: MEDICAID

## 2022-10-26 ENCOUNTER — HOSPITAL ENCOUNTER (OUTPATIENT)
Dept: RADIOLOGY | Facility: HOSPITAL | Age: 37
Discharge: HOME | End: 2022-10-26
Attending: ORTHOPAEDIC SURGERY
Payer: MEDICAID

## 2022-10-26 DIAGNOSIS — S83.004A: ICD-10-CM

## 2022-10-26 PROCEDURE — 73721 MRI KNEE WITHOUT CONTRAST RIGHT: ICD-10-PCS | Mod: 26,RT,, | Performed by: RADIOLOGY

## 2022-10-26 PROCEDURE — 73721 MRI JNT OF LWR EXTRE W/O DYE: CPT | Mod: TC,RT

## 2022-10-26 PROCEDURE — 73721 MRI JNT OF LWR EXTRE W/O DYE: CPT | Mod: 26,RT,, | Performed by: RADIOLOGY

## 2022-11-01 ENCOUNTER — OFFICE VISIT (OUTPATIENT)
Dept: ORTHOPEDICS | Facility: CLINIC | Age: 37
End: 2022-11-01
Payer: MEDICAID

## 2022-11-01 VITALS
HEART RATE: 109 BPM | BODY MASS INDEX: 28.98 KG/M2 | DIASTOLIC BLOOD PRESSURE: 85 MMHG | HEIGHT: 66 IN | SYSTOLIC BLOOD PRESSURE: 149 MMHG | WEIGHT: 180.31 LBS

## 2022-11-01 DIAGNOSIS — S83.004D: Primary | ICD-10-CM

## 2022-11-01 DIAGNOSIS — S83.511A CHRONIC RUPTURE OF ACL OF RIGHT KNEE: ICD-10-CM

## 2022-11-01 PROCEDURE — 99999 PR PBB SHADOW E&M-EST. PATIENT-LVL III: CPT | Mod: PBBFAC,,, | Performed by: ORTHOPAEDIC SURGERY

## 2022-11-01 PROCEDURE — 3077F SYST BP >= 140 MM HG: CPT | Mod: CPTII,,, | Performed by: ORTHOPAEDIC SURGERY

## 2022-11-01 PROCEDURE — 1160F PR REVIEW ALL MEDS BY PRESCRIBER/CLIN PHARMACIST DOCUMENTED: ICD-10-PCS | Mod: CPTII,,, | Performed by: ORTHOPAEDIC SURGERY

## 2022-11-01 PROCEDURE — 3079F PR MOST RECENT DIASTOLIC BLOOD PRESSURE 80-89 MM HG: ICD-10-PCS | Mod: CPTII,,, | Performed by: ORTHOPAEDIC SURGERY

## 2022-11-01 PROCEDURE — 1159F MED LIST DOCD IN RCRD: CPT | Mod: CPTII,,, | Performed by: ORTHOPAEDIC SURGERY

## 2022-11-01 PROCEDURE — 3079F DIAST BP 80-89 MM HG: CPT | Mod: CPTII,,, | Performed by: ORTHOPAEDIC SURGERY

## 2022-11-01 PROCEDURE — 99214 OFFICE O/P EST MOD 30 MIN: CPT | Mod: S$PBB,,, | Performed by: ORTHOPAEDIC SURGERY

## 2022-11-01 PROCEDURE — 1160F RVW MEDS BY RX/DR IN RCRD: CPT | Mod: CPTII,,, | Performed by: ORTHOPAEDIC SURGERY

## 2022-11-01 PROCEDURE — 1159F PR MEDICATION LIST DOCUMENTED IN MEDICAL RECORD: ICD-10-PCS | Mod: CPTII,,, | Performed by: ORTHOPAEDIC SURGERY

## 2022-11-01 PROCEDURE — 99213 OFFICE O/P EST LOW 20 MIN: CPT | Mod: PBBFAC,PN | Performed by: ORTHOPAEDIC SURGERY

## 2022-11-01 PROCEDURE — 99214 PR OFFICE/OUTPT VISIT, EST, LEVL IV, 30-39 MIN: ICD-10-PCS | Mod: S$PBB,,, | Performed by: ORTHOPAEDIC SURGERY

## 2022-11-01 PROCEDURE — 3008F BODY MASS INDEX DOCD: CPT | Mod: CPTII,,, | Performed by: ORTHOPAEDIC SURGERY

## 2022-11-01 PROCEDURE — 4010F PR ACE/ARB THEARPY RXD/TAKEN: ICD-10-PCS | Mod: CPTII,,, | Performed by: ORTHOPAEDIC SURGERY

## 2022-11-01 PROCEDURE — 4010F ACE/ARB THERAPY RXD/TAKEN: CPT | Mod: CPTII,,, | Performed by: ORTHOPAEDIC SURGERY

## 2022-11-01 PROCEDURE — 99999 PR PBB SHADOW E&M-EST. PATIENT-LVL III: ICD-10-PCS | Mod: PBBFAC,,, | Performed by: ORTHOPAEDIC SURGERY

## 2022-11-01 PROCEDURE — 3008F PR BODY MASS INDEX (BMI) DOCUMENTED: ICD-10-PCS | Mod: CPTII,,, | Performed by: ORTHOPAEDIC SURGERY

## 2022-11-01 PROCEDURE — 3077F PR MOST RECENT SYSTOLIC BLOOD PRESSURE >= 140 MM HG: ICD-10-PCS | Mod: CPTII,,, | Performed by: ORTHOPAEDIC SURGERY

## 2022-11-02 ENCOUNTER — PATIENT MESSAGE (OUTPATIENT)
Dept: PSYCHIATRY | Facility: CLINIC | Age: 37
End: 2022-11-02
Payer: MEDICAID

## 2022-11-02 NOTE — PROGRESS NOTES
Patient ID:   Terri Summers is a 37 y.o. female.    Chief Complaint:   Follow-up evaluation for right knee injury    HPI:   Mrs. Summers is returning for evaluation of the right knee. She recently completed MRI scan of the right knee. She reports 6/10 pain. She has been wearing a lateral J brace. She denies any subsequent patellar instability events.     Medications:    Current Outpatient Medications:     ALPRAZolam (XANAX) 0.5 MG tablet, Take 1 tablet (0.5 mg total) by mouth daily as needed for Anxiety., Disp: 30 tablet, Rfl: 5    [START ON 2022] dextroamphetamine-amphetamine (ADDERALL) 30 mg Tab, Take 1 tablet (30 mg total) by mouth 2 (two) times daily., Disp: 60 tablet, Rfl: 0    dextroamphetamine-amphetamine 30 mg Tab, Take 1 tablet (30 mg total) by mouth 2 (two) times daily., Disp: 60 tablet, Rfl: 0    [START ON 2022] dextroamphetamine-amphetamine 30 mg Tab, Take 1 tablet (30 mg total) by mouth 2 (two) times daily., Disp: 60 tablet, Rfl: 0    ergocalciferol (ERGOCALCIFEROL) 50,000 unit Cap, Take 50,000 Units by mouth every 7 days., Disp: , Rfl:     fluconazole (DIFLUCAN) 150 MG Tab, TAKE 1 TABLET BY MOUTH 1 TIME FOR 1 DOSE AS NEEDED FOR YEAST INFECTION, Disp: , Rfl:     ibuprofen (ADVIL,MOTRIN) 800 MG tablet, TK 1 T PO BID WF PRF PAIN, Disp: , Rfl: 0    losartan (COZAAR) 50 MG tablet, losartan 50 mg tablet  Take 1 tablet every day by oral route., Disp: , Rfl:     predniSONE (DELTASONE) 20 MG tablet, Take 20 mg by mouth 2 (two) times daily., Disp: , Rfl:     Allergies:  Review of patient's allergies indicates:   Allergen Reactions    Hydrochlorothiazide        Past Medical History:  Past Medical History:   Diagnosis Date    ADHD (attention deficit hyperactivity disorder)     Anxiety     History of psychiatric care     Hypertension     Psychiatric exam requested by authority     Psychiatric problem     Therapy         Past Surgical History:  Past Surgical History:   Procedure Laterality Date      "SECTION      DILATION AND CURETTAGE OF UTERUS  2008    missed ab    REPAIR OF LIGAMENT Right     thumb       Social History:  Social History     Occupational History    Not on file   Tobacco Use    Smoking status: Every Day    Smokeless tobacco: Never   Substance and Sexual Activity    Alcohol use: Yes     Comment: rare drink occasionally    Drug use: No     Comment: has abused THC, LSD, 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       Family History:  Family History   Problem Relation Age of Onset    ADD / ADHD Father     Ovarian cancer Mother     Ovarian cancer Maternal Grandmother     Breast cancer Neg Hx     Colon cancer Neg Hx         ROS:  Review of Systems   Musculoskeletal:  Positive for falls, joint pain, joint swelling, muscle weakness and myalgias.   Neurological:  Negative for numbness and paresthesias.   All other systems reviewed and are negative.    Vitals:  BP (!) 149/85 (BP Location: Left arm, Patient Position: Sitting, BP Method: Large (Automatic))   Pulse 109   Ht 5' 6" (1.676 m)   Wt 81.8 kg (180 lb 5.4 oz)   BMI 29.11 kg/m²     Physical Examination:  Comprehensive Orthopaedic Musculoskeletal Exam    General      Constitutional: appears stated age, well-developed and well-nourished    Scleral icterus: no    Labored breathing: no    Psychiatric: normal mood and affect and no acute distress    Neurological: alert and oriented x3    Skin: intact    Lymphadenopathy: none   Ortho Exam   Right knee exam:  Minimal effusion.   Tenderness along the medial patella.   ROM: 0-130  Stability: patellar translation 3 quadrants with apprehension; side tos shaila difference in Lachmans with 1B on right and 0 on left. Positive pivot shift. No collateral instability. Negative posterior drawer    Imaging:  I have independently reviewed the following imaging studies performed at [facility]:    MRI Knee Without Contrast Right  Narrative: EXAMINATION:  MRI KNEE " WITHOUT CONTRAST RIGHT    CLINICAL HISTORY:  Knee trauma, dislocation suspected (Age >= 5y);Unspecified dislocation of right patella, initial encounter    TECHNIQUE:  Multiplanar, multisequence images were performed of the right knee.    COMPARISON:  X-ray 10/18/2022    FINDINGS:  Menisci:Medial and lateral menisci are normal.    Ligaments:ACL torn at the femoral attachment.  The PCL, MCL, and lateral collateral ligament complexes are intact.    Extensor Mechanism:Quadriceps and patellar tendons are intact.    Bone and Joint including articular cartilage:There is tricompartmental superficial chondromalacia with no focal high-grade articular cartilage defect.  Findings are most severe involving the patellofemoral.  There is some lateral subluxation of the patella but the retinacular structures appear intact.  No evidence of osteochondral fractures or bone bruises involving the medial patella and lateral trochlea.  There is a large joint effusion with mildly thickened medial plica.  TT-TG distance is approximately 7 mm.    Soft Tissues:Unremarkable.    Miscellaneous:None  Impression: Negative for internal derangement.    Lateral subluxation the patella without augustine dislocation or stigmata of recent lateral patellar dislocation.  Normal TT-TG distance    ACL tear which may be chronic.    Nonspecific joint effusion and low to intermediate grade chondromalacia patella.    Electronically signed by: Burak Vieira Jr  Date:    10/26/2022  Time:    08:35      Assessment:  1. Traumatic dislocation of patella, right, subsequent encounter    2. Chronic rupture of ACL of right knee      Plan:  I have reviewed the findings in detail with the patient. I discussed treatment options including non-operative management and operative reconstruction. She would like to try PT and bracing. If no improvement, she may consider surgery early next year (ACL reconstruction, potential MQTFL reconstruction).    Orders Placed This Encounter     Ambulatory referral/consult to Physical/Occupational Therapy     No follow-ups on file.

## 2022-11-22 ENCOUNTER — CLINICAL SUPPORT (OUTPATIENT)
Dept: REHABILITATION | Facility: HOSPITAL | Age: 37
End: 2022-11-22
Attending: ORTHOPAEDIC SURGERY
Payer: MEDICAID

## 2022-11-22 DIAGNOSIS — Z74.09 DECREASED FUNCTIONAL MOBILITY AND ENDURANCE: ICD-10-CM

## 2022-11-22 DIAGNOSIS — M62.81 QUADRICEPS WEAKNESS: ICD-10-CM

## 2022-11-22 DIAGNOSIS — S83.511A CHRONIC RUPTURE OF ACL OF RIGHT KNEE: ICD-10-CM

## 2022-11-22 DIAGNOSIS — S83.004D: ICD-10-CM

## 2022-11-22 PROCEDURE — 97162 PT EVAL MOD COMPLEX 30 MIN: CPT | Mod: PN | Performed by: PHYSICAL THERAPIST

## 2022-11-22 NOTE — PLAN OF CARE
[facility]  Physical Therapy Initial Evaluation    Date: 11/22/2022   Name: Terri Summers  Clinic Number: [id number]    Therapy Diagnosis:   Encounter Diagnoses   Name Primary?    Traumatic dislocation of patella, right, subsequent encounter     Chronic rupture of ACL of right knee     Decreased functional mobility and endurance     Quadriceps weakness      Physician: Shan Crystal MD    Physician Orders: PT Eval and Treat     ROM, lower extremity and core strengthening  2-3 x per week x 6 weeks    Medical Diagnosis from Referral:   S83.004D (ICD-10-CM) - Traumatic dislocation of patella, right, subsequent encounter   S83.511A (ICD-10-CM) - Chronic rupture of ACL of right knee     Evaluation Date: 11/22/2022  Authorization Period Expiration: 11/1/23  Plan of Care Expiration: 12/31/22  Progress Note Due: 12/28/22  Visit # / Visits authorized: 1/ 20   FOTO: 1/ 5   PTA Visit: 0/5    Precautions: Standard    Time In: 8:20 am  Time Out: 9:20 am  Total Appointment Time (timed & untimed codes): 56 minutes ([facility] [place]      SUBJECTIVE   Date of onset: July 2022    History of current condition - Terri reports: a history of recurrent Bilateral patellar dislocations. In July, she was in a pool, twisted and felt her right patella dislocate. She self relocated. She reports another incident of patellar dislocation in September and again 2 days ago. She was placed in a lateral J brace. Her dislocations occur without the brace. She's trying to hold off on surgery (ACL repair and tightening of lateral structures) until Jan, but feels like she might not be able to wait.     Falls: with dislocations    Imaging, bone scan films: Normal tibiofemoral joint space.  No significant degenerative change.  Normal femoral patellar joint space.  No significant degenerative change at the femoral patellar joint.  There is a small joint effusion.  No fracture, no osseous lesions.  The soft tissues appear  normal.  Impression: Small joint effusion.    MRI:   Negative for internal derangement.     Lateral subluxation the patella without augustine dislocation or stigmata of recent lateral patellar dislocation.  Normal TT-TG distance     ACL tear which may be chronic.     Nonspecific joint effusion and low to intermediate grade chondromalacia patella.    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 Unable to get up from floor or squat. Careful standing after sitting a while. Trouble with ADLs such as laundry. Unable to golf    Pain:  Current 7/10, worst 9/10, best 4/10   Location: right medial knee  Description: burning  Aggravating Factors: first putting weight on her leg, squatting  Easing Factors: ibuprofen/aleve, ice pack    Patients goals: strengthen the left leg to avoid current situation; reduce pain in right      Medical History:   Past Medical History:   Diagnosis Date    ADHD (attention deficit hyperactivity disorder)     Anxiety     History of psychiatric care     Hypertension     Psychiatric exam requested by [facility]     Psychiatric problem     Therapy        Surgical History:   Terri Summers  has a past surgical history that includes Dilation and curettage of uterus ();  section; and Repair of ligament (Right).    Medications:   Terri has a current medication list which includes the following prescription(s): alprazolam, dextroamphetamine-amphetamine, [START ON 2022] dextroamphetamine-amphetamine, dextroamphetamine-amphetamine, ergocalciferol, fluconazole, ibuprofen, losartan, and prednisone.    Allergies:   Review of patient's allergies indicates:   Allergen Reactions    Hydrochlorothiazide           OBJECTIVE       Palpation: not tender    Posture: Overpronation of right in stance    Gross  "Movement Analysis:  - Gait:  Dec'd right stance time, right hip internal rotation (toe in), inc'd right pronation in non supportive shoe  - Squats: NT  - Sit-to-Stand: No upper extremity needed, increased time  - Stairs: NT    Range of Motion:   Knee AROM:   Left: 12-0-145  Right: 0-130    Lower Extremity Strength    All below testing performed in seated position. Gait belt used for quadriceps testing.    Lower extremity strength with [employer] handheld dynamometer Right Left Pain/dysfunction with movement   (approx 4 sec hold w/ max contraction)   Hip flexion 13.1 kg  16.7 kg  supine   Hip abduction 10.3 kg  14.4 kg  supine   Quadriceps 18.4 kg  35.9 kg     Hamstrings 13.4 kg  23.5 kg        Joint Mobility: hypermobility Patella  Bilateral patella edgar  Sensation: intact light touch sensation to BLE throughout L2-S2 dermatomal pattern       Edema:   Girth Measurement Joint line 5 cm below 10 cm above   Left 38 cm 33 cm 44 cm   Right 39 cm 33 cm 46.5 cm     TUG: 10 sec  TUG Cutoff Scores: 13.5         30 second sit-to-stand test (without U/E support): 9x  30" sit to stand Cutoff Scores:15           CMS Impairment/Limitation/Restriction for FOTO Knee Survey    Therapist reviewed FOTO scores for Terri Summers on 11/22/2022.   FOTO documents entered into [employer] - see Media section.    Limitation Score: 71%  Predicted Limitation Score: 42%         TREATMENT     Total Treatment time (time-based codes) separate from Evaluation: 0 minutes     Terri received therapeutic exercises to develop strength, endurance, ROM, flexibility, posture, and core stabilization for 0 minutes including:  Home exercise program education  Quad set  Straight leg raise  Hip abd straight leg raise  Knee extension stretch on chair  Heel slides AROM    Next:  Assess shoe wear  TENS  Long arc quad  Prone hip extension  Clamshells      PATIENT EDUCATION AND HOME EXERCISES     Education provided:   - anatomy  - rehab timeline after ACL repair  - benefits " of TENS unit  - wear more supportive shoes    Written Home Exercises Provided: yes. Exercises were reviewed and Terri was able to demonstrate them prior to the end of the session.  Terri demonstrated good  understanding of the education provided. See EMR under Patient Instructions for exercises provided during therapy sessions.    ASSESSMENT     Terri is a 37 y.o. female referred to outpatient Physical Therapy with a medical diagnosis of traumatic patellar dislocations and chronic ACL tear. Pt presents with report of most recent right patellar dislocation 2 days ago with edema and pain. She arrives wearing a J brace. She presents with right lower extremity weakness, particularly her quad, but able to perform a straight leg raise without lag and appropriate quad set. Inc'd right pronation present in standing with medial aspect of shoe overstretched. Instructed on wearing more supportive shoes. She reports having 2 more dislocations since her main one in July. Her deficits affect her walking, golfing, ADL performance and first standing after sitting or laying. She was trying to wait for surgery until next yr, but thinks she needs to have it sooner and will be reaching out to Dr. Crystal. In the meantime, she is appropriate for skilled PT to help reduce edema, improve pain and lower extremity strength prior to expected surgery.    Pt prognosis is Good.   Pt will benefit from skilled outpatient Physical Therapy to address the deficits stated above and in the chart below, provide pt/family education, and to maximize pt's level of independence.     Plan of care discussed with patient: Yes  Pt's spiritual, cultural and educational needs considered and patient is agreeable to the plan of care and goals as stated below:     Anticipated Barriers for therapy: chronic nature of symptoms    Medical Necessity is demonstrated by the following  History  Co-morbidities and personal factors that may impact the plan of care  Co-morbidities:   Anxiety, ADHD, smoking    Personal Factors:   no deficits     moderate   Examination  Body Structures and Functions, activity limitations and participation restrictions that may impact the plan of care Body Regions:   lower extremities    Body Systems:    ROM  strength  balance  gait  transfers  motor control  motor learning  edema    Participation Restrictions:   Walking, golfing, ADLs    Activity limitations:   Learning and applying knowledge  no deficits    General Tasks and Commands  no deficits    Communication  no deficits    Mobility  lifting and carrying objects  walking    Self care  washing oneself (bathing, drying, washing hands)  dressing    Domestic Life  shopping  cooking  doing house work (cleaning house, washing dishes, laundry)    Interactions/Relationships  family relationships    Life Areas  no deficits    Community and Social Life  community life  recreation and leisure         high     Clinical Presentation evolving clinical presentation with changing clinical characteristics moderate   Decision Making/ Complexity Score: moderate   Goals:  Short Term Goals: 3 weeks   1.  Patient will demonstrate right knee ROM at least 5-0-135 degree.  2.  Patient will demonstrate right quad strength via MicroFET of > 22 kg to improve transfers, gait and ADL performance.  3.  Patient will report worst right knee pain < 5/10 to improve ADL performance.    Long Term Goals: 6 weeks   1.  Patient will demonstrate ability to perform > 12 sit <> stand transfers w/o UE use for improved ability to stand from low surfaces.  2.  Patient will demonstrate right quad strength via MicroFET of > 27 kg to improve transfers, gait and ADL performance.  3.  Patient will demonstrate to PT comprehensive understanding of each HEP component without verbal cueing.   4.  Patient will improve FOTO to < 43% to improve ADL performance.    PLAN   Plan of care Certification: 11/22/2022 to 12/31/22.    Outpatient Physical  Therapy 1 times weekly for 6 weeks to include the following interventions: Electrical Stimulation TENS, IFC, NMES, Gait Training, Manual Therapy, Moist Heat/ Ice, Neuromuscular Re-ed, Patient Education, Self Care, Therapeutic Activities, Therapeutic Exercise, and dry needling.     Shan Dolan, PT      I CERTIFY THE NEED FOR THESE SERVICES FURNISHED UNDER THIS PLAN OF TREATMENT AND WHILE UNDER MY CARE   Physician's comments:     Physician's Signature: ___________________________________________________     I certify the need for these services furnished under this plan of treatment and while under my care.        Shan Crystal MD, FAAOS  , Orthopaedic Sports Medicine  Residency   [facility] Department of Orthopaedic Surgery  Assistant Orthopaedic Surgeon, [facility]  Head Team Physician, [facility]

## 2022-11-23 ENCOUNTER — OFFICE VISIT (OUTPATIENT)
Dept: PSYCHIATRY | Facility: CLINIC | Age: 37
End: 2022-11-23
Payer: MEDICAID

## 2022-11-23 DIAGNOSIS — F41.0 GENERALIZED ANXIETY DISORDER WITH PANIC ATTACKS: ICD-10-CM

## 2022-11-23 DIAGNOSIS — F41.1 GENERALIZED ANXIETY DISORDER WITH PANIC ATTACKS: ICD-10-CM

## 2022-11-23 DIAGNOSIS — F90.2 ADHD (ATTENTION DEFICIT HYPERACTIVITY DISORDER), COMBINED TYPE: Primary | ICD-10-CM

## 2022-11-23 PROCEDURE — 4010F ACE/ARB THERAPY RXD/TAKEN: CPT | Mod: SA,HB,CPTII,95 | Performed by: NURSE PRACTITIONER

## 2022-11-23 PROCEDURE — 1160F PR REVIEW ALL MEDS BY PRESCRIBER/CLIN PHARMACIST DOCUMENTED: ICD-10-PCS | Mod: SA,HB,CPTII,95 | Performed by: NURSE PRACTITIONER

## 2022-11-23 PROCEDURE — 4010F PR ACE/ARB THEARPY RXD/TAKEN: ICD-10-PCS | Mod: SA,HB,CPTII,95 | Performed by: NURSE PRACTITIONER

## 2022-11-23 PROCEDURE — 1160F RVW MEDS BY RX/DR IN RCRD: CPT | Mod: SA,HB,CPTII,95 | Performed by: NURSE PRACTITIONER

## 2022-11-23 PROCEDURE — 1159F PR MEDICATION LIST DOCUMENTED IN MEDICAL RECORD: ICD-10-PCS | Mod: SA,HB,CPTII,95 | Performed by: NURSE PRACTITIONER

## 2022-11-23 PROCEDURE — 1159F MED LIST DOCD IN RCRD: CPT | Mod: SA,HB,CPTII,95 | Performed by: NURSE PRACTITIONER

## 2022-11-23 PROCEDURE — 99213 PR OFFICE/OUTPT VISIT, EST, LEVL III, 20-29 MIN: ICD-10-PCS | Mod: SA,HB,95, | Performed by: NURSE PRACTITIONER

## 2022-11-23 PROCEDURE — 99212 OFFICE O/P EST SF 10 MIN: CPT | Performed by: NURSE PRACTITIONER

## 2022-11-23 PROCEDURE — 99213 OFFICE O/P EST LOW 20 MIN: CPT | Mod: SA,HB,95, | Performed by: NURSE PRACTITIONER

## 2022-11-23 RX ORDER — DEXTROAMPHETAMINE SACCHARATE, AMPHETAMINE ASPARTATE, DEXTROAMPHETAMINE SULFATE AND AMPHETAMINE SULFATE 7.5; 7.5; 7.5; 7.5 MG/1; MG/1; MG/1; MG/1
30 TABLET ORAL 2 TIMES DAILY
Qty: 180 TABLET | Refills: 0 | Status: SHIPPED | OUTPATIENT
Start: 2022-11-23 | End: 2023-02-20 | Stop reason: SDUPTHER

## 2022-11-27 NOTE — PROGRESS NOTES
Outpatient Psychiatry Follow-Up Visit (MD/NP)    11/23/2022    Clinical Status of Patient:  Outpatient (Ambulatory)    Chief Complaint:  Terri Summers is a 37 y.o. female who presents today for follow-up of attention problems.  Met with patient.      Last visit was: 9/30/22  The patient location is: home  The chief complaint leading to consultation is: attention problems    Visit type: audiovisual    Face to Face time with patient: 30 minutes  32 minutes of total time spent on the encounter, which includes face to face time and non-face to face time preparing to see the patient (eg, review of tests), Obtaining and/or reviewing separately obtained history, Documenting clinical information in the electronic or other health record, Independently interpreting results (not separately reported) and communicating results to the patient/family/caregiver, or Care coordination (not separately reported).     Each patient to whom he or she provides medical services by telemedicine is:  (1) informed of the relationship between the physician and patient and the respective role of any other health care provider with respect to management of the patient; and (2) notified that he or she may decline to receive medical services by telemedicine and may withdraw from such care at any time.    Interval History and Content of Current Session:  Current Psychiatric Medications/changes  Continue Adderall 30 mg po BID  Continue Xanax 0.5 mg po daily PRN panic attacks    Virtual Visit: Pt presents bright affect and euthymic mood. Reports improvement in anxiety and ADH symptoms with current medications. Reports that she is doing well. Denies SI/HI/AVH. Will continue meds    Psychotherapy:  Target symptoms: distractability, lack of focus  Why chosen therapy is appropriate versus another modality: relevant to diagnosis  Outcome monitoring methods: self-report  Therapeutic intervention type: insight oriented psychotherapy  Topics discussed/themes:  building skills sets for symptom management, symptom recognition  The patient's response to the intervention is accepting. The patient's progress toward treatment goals is good.   Duration of intervention: 14 minutes.    Review of Systems   PSYCHIATRIC: Pertinant items are noted in the narrative.  CONSTITUTIONAL: No weight gain or loss.   MUSCULOSKELETAL: No pain or stiffness of the joints.  NEUROLOGIC: No weakness, sensory changes, seizures, confusion, memory loss, tremor or other abnormal movements.  ENDOCRINE: No polydipsia or polyuria.  INTEGUMENTARY: No rashes or lacerations.  EYES: No exophthalmos, jaundice or blindness.  ENT: No dizziness, tinnitus or hearing loss.  RESPIRATORY: No shortness of breath.  CARDIOVASCULAR: No tachycardia or chest pain.  GASTROINTESTINAL: No nausea, vomiting, pain, constipation or diarrhea.  GENITOURINARY: No frequency, dysuria or sexual dysfunction.  HEMATOLOGIC/LYMPHATIC: No excessive bleeding, prolonged or excessive bleeding after dental extraction/injury.  ALLERGIC/IMMUNOLOGIC: No allergic response to materials, foods or animals at this time.    Past Medical, Family and Social History: The patient's past medical, family and social history have been reviewed and updated as appropriate within the electronic medical record - see encounter notes.    Compliance: no    Side effects: None    Risk Parameters:  Patient reports no suicidal ideation  Patient reports no homicidal ideation  Patient reports no self-injurious behavior  Patient reports no violent behavior    Exam (detailed: at least 9 elements; comprehensive: all 15 elements)   Constitutional  Vitals:  Most recent vital signs, dated greater than 90 days prior to this appointment, were not reviewed.   There were no vitals filed for this visit.     General:  unremarkable, age appropriate     Musculoskeletal  Muscle Strength/Tone:  no tremor   Gait & Station:  non-ataxic     Psychiatric  Speech:  no latency; no press   Mood &  Affect:  steady  congruent and appropriate   Thought Process:  normal and logical   Associations:  intact   Thought Content:  normal, no suicidality, no homicidality, delusions, or paranoia   Insight:  intact   Judgement: behavior is adequate to circumstances   Orientation:  grossly intact   Memory: intact for content of interview   Language: grossly intact   Attention Span & Concentration:  able to focus   Fund of Knowledge:  intact and appropriate to age and level of education     Assessment and Diagnosis   Status/Progress: Based on the examination today, the patient's problem(s) is/are improved.  New problems have not been presented today.   Co-morbidities and Lack of compliance are not complicating management of the primary condition.  There are no active rule-out diagnoses for this patient at this time.     General Impression:       ICD-10-CM ICD-9-CM   1. ADHD (attention deficit hyperactivity disorder), combined type  F90.2 314.01   2. Generalized anxiety disorder with panic attacks  F41.1 300.02    F41.0 300.01     Intervention/Counseling/Treatment Plan   Medication Management: The risks and benefits of medication were discussed with the patient.  Continue Adderall 30 mg po BID  Continue Xanax 0.5 mg po daily PRN panic attacks    Return to Clinic: 3 months    Risks, benefits, side effects and alternative treatments discussed with patient. Patient agrees with the current plan as documented.  Encouraged Patient to keep future appointments.  Take medications as prescribed and abstain from substance abuse.  Pt to present to ED for thoughts to harm herself or others

## 2022-11-28 DIAGNOSIS — S83.511A CHRONIC RUPTURE OF ACL OF RIGHT KNEE: Primary | ICD-10-CM

## 2022-11-28 DIAGNOSIS — S83.004D: ICD-10-CM

## 2022-11-28 RX ORDER — CEFAZOLIN SODIUM 2 G/50ML
2 SOLUTION INTRAVENOUS
Status: CANCELLED | OUTPATIENT
Start: 2022-11-28

## 2022-11-28 RX ORDER — SODIUM CHLORIDE 9 MG/ML
INJECTION, SOLUTION INTRAVENOUS CONTINUOUS
Status: CANCELLED | OUTPATIENT
Start: 2022-11-28

## 2022-11-30 ENCOUNTER — PATIENT MESSAGE (OUTPATIENT)
Dept: REHABILITATION | Facility: HOSPITAL | Age: 37
End: 2022-11-30
Payer: MEDICAID

## 2022-11-30 ENCOUNTER — PATIENT MESSAGE (OUTPATIENT)
Dept: SURGERY | Facility: HOSPITAL | Age: 37
End: 2022-11-30
Payer: MEDICAID

## 2022-12-01 ENCOUNTER — CLINICAL SUPPORT (OUTPATIENT)
Dept: REHABILITATION | Facility: HOSPITAL | Age: 37
End: 2022-12-01
Payer: MEDICAID

## 2022-12-01 DIAGNOSIS — Z74.09 DECREASED FUNCTIONAL MOBILITY AND ENDURANCE: Primary | ICD-10-CM

## 2022-12-01 DIAGNOSIS — M62.81 QUADRICEPS WEAKNESS: ICD-10-CM

## 2022-12-01 NOTE — PROGRESS NOTES
Pt arrived but has to cancel therapy today due to personal family reasons. She will message us about future appointment modifications.

## 2022-12-06 ENCOUNTER — TELEPHONE (OUTPATIENT)
Dept: REHABILITATION | Facility: HOSPITAL | Age: 37
End: 2022-12-06
Payer: MEDICAID

## 2022-12-08 ENCOUNTER — HOSPITAL ENCOUNTER (OUTPATIENT)
Dept: PREADMISSION TESTING | Facility: HOSPITAL | Age: 37
Discharge: HOME | End: 2022-12-08
Attending: ORTHOPAEDIC SURGERY
Payer: MEDICAID

## 2022-12-08 ENCOUNTER — ANESTHESIA EVENT (OUTPATIENT)
Dept: SURGERY | Facility: HOSPITAL | Age: 37
End: 2022-12-08
Payer: MEDICAID

## 2022-12-08 VITALS
BODY MASS INDEX: 29.23 KG/M2 | SYSTOLIC BLOOD PRESSURE: 127 MMHG | DIASTOLIC BLOOD PRESSURE: 82 MMHG | WEIGHT: 181.88 LBS | OXYGEN SATURATION: 100 % | HEART RATE: 114 BPM | HEIGHT: 66 IN

## 2022-12-08 RX ORDER — SCOLOPAMINE TRANSDERMAL SYSTEM 1 MG/1
1 PATCH, EXTENDED RELEASE TRANSDERMAL
Status: CANCELLED | OUTPATIENT
Start: 2022-12-08

## 2022-12-08 RX ORDER — LIDOCAINE HYDROCHLORIDE 10 MG/ML
1 INJECTION, SOLUTION EPIDURAL; INFILTRATION; INTRACAUDAL; PERINEURAL ONCE
Status: CANCELLED | OUTPATIENT
Start: 2022-12-08 | End: 2022-12-08

## 2022-12-08 NOTE — PRE-PROCEDURE INSTRUCTIONS
Farooq Fuller [phone]    Allergies, medical, surgical, family and psychosocial histories reviewed with patient. Periop plan of care reviewed. Preop instructions given, including medications to take and to hold. Hibiclens soap and instructions on use given. Time allotted for questions to be addressed.  Patient verbalized understanding.

## 2022-12-08 NOTE — DISCHARGE INSTRUCTIONS
Your surgery is scheduled for 12/15/22.    Please report to [facility] on the 1st Floor at 0745 a.m.    THIS TIME IS SUBJECT TO CHANGE.  YOU WILL RECEIVE A PHONE CALL THE DAY BEFORE SURGERY BY 3:30 PM TO CONFIRM YOUR TIME OF ARRIVAL.  IF YOU HAVE NOT RECEIVED A PHONE CALL BY 3:30 PM THE DAY BEFORE YOUR SURGERY PLEASE CALL [phone]     INSTRUCTIONS IMPORTANT!!!  ¨ Do not eat or drink after 12 midnight-including water, candy, gum, & mints. OK to brush teeth.      ____  Proceed to [facility] on *** for additional testing.        ____  Do not wear makeup, including mascara.  ____  No powder, lotions or creams to surgical area.  ____  Please remove all jewelry, including piercings and leave at home.  ____  No money or valuables needed. Please leave at home.  ____  Please bring any documents given by your doctor.  ____  If going home the same day, arrange for a ride home. You will not be able to             drive if Anesthesia was used.  ____  Children under 18 years require a parent / guardian present the entire time             they are in surgery / recovery.  ____  Wear loose fitting clothing. Allow for dressings, bandages.  ____  Stop Aspirin, Ibuprofen, Motrin, Aleve, Goody's/BC powders, Excedrine and Naproxen (NSAIDS) at least 3-5 days before surgery, unless otherwise instructed by your doctor, or the nurse.   You MAY use Tylenol/acetaminophen until day of surgery.  ____  Wash the surgical area with Hibiclens the night before surgery, and again the             morning of surgery.  Be sure to rinse hibiclens off completely (if instructed by   nurse).  ____  If you take diabetic medication, do not take am of surgery unless instructed by Doctor.  ____  Call MD for temperature above 101 degrees or any other signs of infection such as Urinary (bladder) infection, Upper respiratory infection, skin boils, etc.  ____ Stop taking any Fish Oil supplement or any Vitamins that contain Vitamin E at  least 5 days prior to surgery.  ____ Do Not wear your contact lenses the day of your procedure.  You may wear your glasses.      ____Do not shave surgical site for 3 days prior to surgery.  ____ Practice Good hand washing before, during, and after procedure.      I have read or had read and explained to me, and understand the above information.  Additional comments or instructions:  For additional questions call [phone]      ANESTHESIA SIDE EFFECTS  -For the first 24 hours after surgery:  Do not drive, use heavy equipment, make important decisions, or drink alcohol  -It is normal to feel sleepy for several hours.  Rest until you are more awake.  -Have someone stay with you, if needed.  They can watch for problems and help keep you safe.  -Some possible post anesthesia side effects include: nausea and vomiting, sore throat and hoarseness, sleepiness, and dizziness.        Pre-Op Bathing Instructions    Before surgery, you can play an important role in your own health.    Because skin is not sterile, we need to be sure that your skin is as free of germs as possible. By following the instructions below, you can reduce the number of germs on your skin before surgery.    IMPORTANT: You will need to shower with a special soap called Hibiclens*, available at any pharmacy.  If you are allergic to Chlorhexidine (the antiseptic in Hibiclens), use an antibacterial soap such as Dial Soap for your preoperative shower.  You will shower with Hibiclens both the night before your surgery and the morning of your surgery.  Do not use Hibiclens on the head, face or genitals to avoid injury to those areas.    STEP #1: THE NIGHT BEFORE YOUR SURGERY     Do not shave the area of your body where your surgery will be performed.  Shower and wash your hair and body as usual with your normal soap and shampoo.  Rinse your hair and body thoroughly after you shower to remove all soap residue.  With your hand, apply one packet of Hibiclens soap to  the surgical site.   Wash the site gently for five (5) minutes. Do not scrub your skin too hard.   Do not wash with your regular soap after Hibiclens is used.  Rinse your body thoroughly.  Pat yourself dry with a clean, soft towel.  Do not use lotion, cream, or powder.  Wear clean clothes.    STEP #2: THE MORNING OF YOUR SURGERY     Repeat Step #1.    * Not to be used by people allergic to Chlorhexidine.

## 2022-12-15 ENCOUNTER — ANESTHESIA (OUTPATIENT)
Dept: SURGERY | Facility: HOSPITAL | Age: 37
End: 2022-12-15
Payer: MEDICAID

## 2022-12-15 ENCOUNTER — HOSPITAL ENCOUNTER (OUTPATIENT)
Facility: HOSPITAL | Age: 37
Discharge: HOME | End: 2022-12-15
Attending: ORTHOPAEDIC SURGERY | Admitting: ORTHOPAEDIC SURGERY
Payer: MEDICAID

## 2022-12-15 DIAGNOSIS — S83.511D RUPTURE OF ANTERIOR CRUCIATE LIGAMENT OF RIGHT KNEE, SUBSEQUENT ENCOUNTER: Primary | ICD-10-CM

## 2022-12-15 DIAGNOSIS — S83.511A CHRONIC RUPTURE OF ACL OF RIGHT KNEE: ICD-10-CM

## 2022-12-15 DIAGNOSIS — S83.004D: ICD-10-CM

## 2022-12-15 DIAGNOSIS — S83.519A ACL (ANTERIOR CRUCIATE LIGAMENT) RUPTURE: ICD-10-CM

## 2022-12-15 DIAGNOSIS — S83.261A PERIPHERAL TEAR OF LATERAL MENISCUS OF RIGHT KNEE AS CURRENT INJURY, INITIAL ENCOUNTER: ICD-10-CM

## 2022-12-15 LAB
B-HCG UR QL: NEGATIVE
CTP QC/QA: YES

## 2022-12-15 PROCEDURE — D9220A PRA ANESTHESIA: Mod: CRNA,,, | Performed by: NURSE ANESTHETIST, CERTIFIED REGISTERED

## 2022-12-15 PROCEDURE — 64447 NJX AA&/STRD FEMORAL NRV IMG: CPT | Mod: 59,RT | Performed by: ANESTHESIOLOGY

## 2022-12-15 PROCEDURE — 36000711: Performed by: ORTHOPAEDIC SURGERY

## 2022-12-15 PROCEDURE — 64450 NJX AA&/STRD OTHER PN/BRANCH: CPT | Mod: 59,RT | Performed by: ANESTHESIOLOGY

## 2022-12-15 PROCEDURE — 63600175 PHARM REV CODE 636 W HCPCS: Performed by: ANESTHESIOLOGY

## 2022-12-15 PROCEDURE — 29882 PR KNEE SCOPE,MED OR LAT MENIS REPAIR: ICD-10-PCS | Mod: 51,RT,, | Performed by: ORTHOPAEDIC SURGERY

## 2022-12-15 PROCEDURE — 76942 ECHO GUIDE FOR BIOPSY: CPT | Performed by: ANESTHESIOLOGY

## 2022-12-15 PROCEDURE — 63600175 PHARM REV CODE 636 W HCPCS: Performed by: ORTHOPAEDIC SURGERY

## 2022-12-15 PROCEDURE — 71000039 HC RECOVERY, EACH ADD'L HOUR: Performed by: ORTHOPAEDIC SURGERY

## 2022-12-15 PROCEDURE — 27201423 OPTIME MED/SURG SUP & DEVICES STERILE SUPPLY: Performed by: ORTHOPAEDIC SURGERY

## 2022-12-15 PROCEDURE — 25000003 PHARM REV CODE 250: Performed by: ANESTHESIOLOGY

## 2022-12-15 PROCEDURE — 64447 NJX AA&/STRD FEMORAL NRV IMG: CPT | Performed by: ANESTHESIOLOGY

## 2022-12-15 PROCEDURE — C1713 ANCHOR/SCREW BN/BN,TIS/BN: HCPCS | Performed by: ORTHOPAEDIC SURGERY

## 2022-12-15 PROCEDURE — 25000003 PHARM REV CODE 250: Performed by: NURSE ANESTHETIST, CERTIFIED REGISTERED

## 2022-12-15 PROCEDURE — 63600175 PHARM REV CODE 636 W HCPCS: Performed by: NURSE ANESTHETIST, CERTIFIED REGISTERED

## 2022-12-15 PROCEDURE — D9220A PRA ANESTHESIA: ICD-10-PCS | Mod: CRNA,,, | Performed by: NURSE ANESTHETIST, CERTIFIED REGISTERED

## 2022-12-15 PROCEDURE — 29882 ARTHRS KNE SRG MNISC RPR M/L: CPT | Mod: 51,RT,, | Performed by: ORTHOPAEDIC SURGERY

## 2022-12-15 PROCEDURE — D9220A PRA ANESTHESIA: ICD-10-PCS | Mod: ANES,,, | Performed by: ANESTHESIOLOGY

## 2022-12-15 PROCEDURE — 37000008 HC ANESTHESIA 1ST 15 MINUTES: Performed by: ORTHOPAEDIC SURGERY

## 2022-12-15 PROCEDURE — 97161 PT EVAL LOW COMPLEX 20 MIN: CPT

## 2022-12-15 PROCEDURE — 71000015 HC POSTOP RECOV 1ST HR: Performed by: ORTHOPAEDIC SURGERY

## 2022-12-15 PROCEDURE — C1762 CONN TISS, HUMAN(INC FASCIA): HCPCS | Performed by: ORTHOPAEDIC SURGERY

## 2022-12-15 PROCEDURE — D9220A PRA ANESTHESIA: Mod: ANES,,, | Performed by: ANESTHESIOLOGY

## 2022-12-15 PROCEDURE — 29888 ARTHRS AID ACL RPR/AGMNTJ: CPT | Mod: RT,,, | Performed by: ORTHOPAEDIC SURGERY

## 2022-12-15 PROCEDURE — 97116 GAIT TRAINING THERAPY: CPT

## 2022-12-15 PROCEDURE — 27420 REVISION OF UNSTABLE KNEECAP: CPT | Mod: 51,RT,, | Performed by: ORTHOPAEDIC SURGERY

## 2022-12-15 PROCEDURE — 27420 PR REVISION OF UNSTABLE PATELLA: ICD-10-PCS | Mod: 51,RT,, | Performed by: ORTHOPAEDIC SURGERY

## 2022-12-15 PROCEDURE — 36000710: Performed by: ORTHOPAEDIC SURGERY

## 2022-12-15 PROCEDURE — 71000033 HC RECOVERY, INTIAL HOUR: Performed by: ORTHOPAEDIC SURGERY

## 2022-12-15 PROCEDURE — C1769 GUIDE WIRE: HCPCS | Performed by: ORTHOPAEDIC SURGERY

## 2022-12-15 PROCEDURE — 01400 ANES OPN/ARTHRS KNEE JT NOS: CPT | Performed by: ORTHOPAEDIC SURGERY

## 2022-12-15 PROCEDURE — 37000009 HC ANESTHESIA EA ADD 15 MINS: Performed by: ORTHOPAEDIC SURGERY

## 2022-12-15 PROCEDURE — 29888 PR KNEE SCOPE,AID ANT CRUCIATE REPAIR: ICD-10-PCS | Mod: RT,,, | Performed by: ORTHOPAEDIC SURGERY

## 2022-12-15 DEVICE — GUIDE FEMORAL BULLSEYE END: Type: IMPLANTABLE DEVICE | Site: KNEE | Status: FUNCTIONAL

## 2022-12-15 DEVICE — SYS NOVOSTITCH PRO MENIS 2-0: Type: IMPLANTABLE DEVICE | Site: KNEE | Status: FUNCTIONAL

## 2022-12-15 DEVICE — GUIDE GRAFTMAX XACTPIN FLEX: Type: IMPLANTABLE DEVICE | Site: KNEE | Status: FUNCTIONAL

## 2022-12-15 DEVICE — IMPLANTABLE DEVICE: Type: IMPLANTABLE DEVICE | Site: KNEE | Status: FUNCTIONAL

## 2022-12-15 DEVICE — CARTRIDGE NOVOSTITCH PLUS 2-0: Type: IMPLANTABLE DEVICE | Site: KNEE | Status: FUNCTIONAL

## 2022-12-15 RX ORDER — FENTANYL CITRATE 50 UG/ML
25 INJECTION, SOLUTION INTRAMUSCULAR; INTRAVENOUS EVERY 5 MIN PRN
Status: COMPLETED | OUTPATIENT
Start: 2022-12-15 | End: 2022-12-15

## 2022-12-15 RX ORDER — CEFAZOLIN SODIUM 2 G/50ML
2 SOLUTION INTRAVENOUS
Status: COMPLETED | OUTPATIENT
Start: 2022-12-15 | End: 2022-12-15

## 2022-12-15 RX ORDER — SCOLOPAMINE TRANSDERMAL SYSTEM 1 MG/1
1 PATCH, EXTENDED RELEASE TRANSDERMAL ONCE
Status: DISCONTINUED | OUTPATIENT
Start: 2022-12-15 | End: 2022-12-15 | Stop reason: HOSPADM

## 2022-12-15 RX ORDER — EPINEPHRINE 1 MG/ML
INJECTION, SOLUTION, CONCENTRATE INTRAVENOUS
Status: DISCONTINUED | OUTPATIENT
Start: 2022-12-15 | End: 2022-12-15 | Stop reason: HOSPADM

## 2022-12-15 RX ORDER — SODIUM CHLORIDE 9 MG/ML
INJECTION, SOLUTION INTRAVENOUS CONTINUOUS
Status: DISCONTINUED | OUTPATIENT
Start: 2022-12-15 | End: 2022-12-15 | Stop reason: HOSPADM

## 2022-12-15 RX ORDER — BUPIVACAINE HYDROCHLORIDE 2.5 MG/ML
INJECTION, SOLUTION EPIDURAL; INFILTRATION; INTRACAUDAL
Status: COMPLETED | OUTPATIENT
Start: 2022-12-15 | End: 2022-12-15

## 2022-12-15 RX ORDER — DEXAMETHASONE SODIUM PHOSPHATE 4 MG/ML
INJECTION, SOLUTION INTRA-ARTICULAR; INTRALESIONAL; INTRAMUSCULAR; INTRAVENOUS; SOFT TISSUE
Status: DISCONTINUED | OUTPATIENT
Start: 2022-12-15 | End: 2022-12-15

## 2022-12-15 RX ORDER — OXYCODONE HYDROCHLORIDE 5 MG/1
5 TABLET ORAL EVERY 30 MIN PRN
Status: DISCONTINUED | OUTPATIENT
Start: 2022-12-15 | End: 2022-12-15 | Stop reason: HOSPADM

## 2022-12-15 RX ORDER — HYDROCODONE BITARTRATE AND ACETAMINOPHEN 5; 325 MG/1; MG/1
1 TABLET ORAL EVERY 6 HOURS PRN
Qty: 28 TABLET | Refills: 0 | Status: SHIPPED | OUTPATIENT
Start: 2022-12-15 | End: 2022-12-22

## 2022-12-15 RX ORDER — ONDANSETRON 2 MG/ML
INJECTION INTRAMUSCULAR; INTRAVENOUS
Status: DISCONTINUED | OUTPATIENT
Start: 2022-12-15 | End: 2022-12-15

## 2022-12-15 RX ORDER — OXYCODONE HYDROCHLORIDE 5 MG/1
5 TABLET ORAL ONCE
Status: COMPLETED | OUTPATIENT
Start: 2022-12-15 | End: 2022-12-15

## 2022-12-15 RX ORDER — EPHEDRINE SULFATE 50 MG/ML
INJECTION, SOLUTION INTRAVENOUS
Status: DISCONTINUED | OUTPATIENT
Start: 2022-12-15 | End: 2022-12-15

## 2022-12-15 RX ORDER — METHOCARBAMOL 100 MG/ML
1000 INJECTION, SOLUTION INTRAMUSCULAR; INTRAVENOUS ONCE
Status: COMPLETED | OUTPATIENT
Start: 2022-12-15 | End: 2022-12-15

## 2022-12-15 RX ORDER — KETOROLAC TROMETHAMINE 30 MG/ML
30 INJECTION, SOLUTION INTRAMUSCULAR; INTRAVENOUS ONCE
Status: COMPLETED | OUTPATIENT
Start: 2022-12-15 | End: 2022-12-15

## 2022-12-15 RX ORDER — ROCURONIUM BROMIDE 10 MG/ML
INJECTION, SOLUTION INTRAVENOUS
Status: DISCONTINUED | OUTPATIENT
Start: 2022-12-15 | End: 2022-12-15

## 2022-12-15 RX ORDER — SODIUM CHLORIDE 0.9 % (FLUSH) 0.9 %
10 SYRINGE (ML) INJECTION
Status: DISCONTINUED | OUTPATIENT
Start: 2022-12-15 | End: 2022-12-15 | Stop reason: HOSPADM

## 2022-12-15 RX ORDER — LIDOCAINE HYDROCHLORIDE 10 MG/ML
1 INJECTION, SOLUTION EPIDURAL; INFILTRATION; INTRACAUDAL; PERINEURAL ONCE
Status: DISCONTINUED | OUTPATIENT
Start: 2022-12-15 | End: 2022-12-15 | Stop reason: HOSPADM

## 2022-12-15 RX ORDER — SCOLOPAMINE TRANSDERMAL SYSTEM 1 MG/1
1 PATCH, EXTENDED RELEASE TRANSDERMAL
Status: DISCONTINUED | OUTPATIENT
Start: 2022-12-15 | End: 2022-12-15 | Stop reason: SDUPTHER

## 2022-12-15 RX ORDER — ACETAMINOPHEN 10 MG/ML
INJECTION, SOLUTION INTRAVENOUS
Status: DISCONTINUED | OUTPATIENT
Start: 2022-12-15 | End: 2022-12-15

## 2022-12-15 RX ORDER — NEOSTIGMINE METHYLSULFATE 1 MG/ML
INJECTION, SOLUTION INTRAVENOUS
Status: DISCONTINUED | OUTPATIENT
Start: 2022-12-15 | End: 2022-12-15

## 2022-12-15 RX ORDER — CEPHALEXIN 500 MG/1
500 CAPSULE ORAL EVERY 6 HOURS
Qty: 2 CAPSULE | Refills: 0 | Status: SHIPPED | OUTPATIENT
Start: 2022-12-15 | End: 2022-12-16

## 2022-12-15 RX ORDER — PROPOFOL 10 MG/ML
VIAL (ML) INTRAVENOUS
Status: DISCONTINUED | OUTPATIENT
Start: 2022-12-15 | End: 2022-12-15

## 2022-12-15 RX ORDER — ONDANSETRON 4 MG/1
8 TABLET, ORALLY DISINTEGRATING ORAL EVERY 8 HOURS PRN
Qty: 30 TABLET | Refills: 0 | Status: SHIPPED | OUTPATIENT
Start: 2022-12-15 | End: 2022-12-20

## 2022-12-15 RX ORDER — ONDANSETRON 2 MG/ML
4 INJECTION INTRAMUSCULAR; INTRAVENOUS DAILY PRN
Status: DISCONTINUED | OUTPATIENT
Start: 2022-12-15 | End: 2022-12-15 | Stop reason: HOSPADM

## 2022-12-15 RX ORDER — LIDOCAINE HYDROCHLORIDE 20 MG/ML
INJECTION INTRAVENOUS
Status: DISCONTINUED | OUTPATIENT
Start: 2022-12-15 | End: 2022-12-15

## 2022-12-15 RX ORDER — FENTANYL CITRATE 50 UG/ML
INJECTION, SOLUTION INTRAMUSCULAR; INTRAVENOUS
Status: DISCONTINUED | OUTPATIENT
Start: 2022-12-15 | End: 2022-12-15

## 2022-12-15 RX ORDER — MIDAZOLAM HYDROCHLORIDE 1 MG/ML
INJECTION, SOLUTION INTRAMUSCULAR; INTRAVENOUS
Status: DISCONTINUED | OUTPATIENT
Start: 2022-12-15 | End: 2022-12-15

## 2022-12-15 RX ORDER — ACETAMINOPHEN 325 MG/1
325 TABLET ORAL EVERY 6 HOURS PRN
COMMUNITY

## 2022-12-15 RX ORDER — PHENYLEPHRINE HYDROCHLORIDE 10 MG/ML
INJECTION INTRAVENOUS
Status: DISCONTINUED | OUTPATIENT
Start: 2022-12-15 | End: 2022-12-15

## 2022-12-15 RX ADMIN — CEFAZOLIN SODIUM 2 G: 2 SOLUTION INTRAVENOUS at 10:12

## 2022-12-15 RX ADMIN — PROPOFOL 200 MG: 10 INJECTION, EMULSION INTRAVENOUS at 10:12

## 2022-12-15 RX ADMIN — SCOPALAMINE 1 PATCH: 1 PATCH, EXTENDED RELEASE TRANSDERMAL at 07:12

## 2022-12-15 RX ADMIN — PHENYLEPHRINE HYDROCHLORIDE 100 MCG: 10 INJECTION INTRAVENOUS at 11:12

## 2022-12-15 RX ADMIN — KETOROLAC TROMETHAMINE 30 MG: 30 INJECTION, SOLUTION INTRAMUSCULAR at 02:12

## 2022-12-15 RX ADMIN — FENTANYL CITRATE 25 MCG: 50 INJECTION INTRAMUSCULAR; INTRAVENOUS at 01:12

## 2022-12-15 RX ADMIN — ONDANSETRON 8 MG: 2 INJECTION, SOLUTION INTRAMUSCULAR; INTRAVENOUS at 12:12

## 2022-12-15 RX ADMIN — ROCURONIUM BROMIDE 50 MG: 10 INJECTION, SOLUTION INTRAVENOUS at 10:12

## 2022-12-15 RX ADMIN — PHENYLEPHRINE HYDROCHLORIDE 100 MCG: 10 INJECTION INTRAVENOUS at 12:12

## 2022-12-15 RX ADMIN — METHOCARBAMOL 1000 MG: 100 INJECTION INTRAMUSCULAR; INTRAVENOUS at 03:12

## 2022-12-15 RX ADMIN — PHENYLEPHRINE HYDROCHLORIDE 100 MCG: 10 INJECTION INTRAVENOUS at 10:12

## 2022-12-15 RX ADMIN — BUPIVACAINE HYDROCHLORIDE 20 ML: 2.5 INJECTION, SOLUTION EPIDURAL; INFILTRATION; INTRACAUDAL; PERINEURAL at 09:12

## 2022-12-15 RX ADMIN — PHENYLEPHRINE HYDROCHLORIDE 150 MCG: 10 INJECTION INTRAVENOUS at 11:12

## 2022-12-15 RX ADMIN — FENTANYL CITRATE 25 MCG: 50 INJECTION, SOLUTION INTRAMUSCULAR; INTRAVENOUS at 01:12

## 2022-12-15 RX ADMIN — PROPOFOL 70 MG: 10 INJECTION, EMULSION INTRAVENOUS at 11:12

## 2022-12-15 RX ADMIN — SODIUM CHLORIDE, SODIUM LACTATE, POTASSIUM CHLORIDE, AND CALCIUM CHLORIDE: .6; .31; .03; .02 INJECTION, SOLUTION INTRAVENOUS at 10:12

## 2022-12-15 RX ADMIN — DEXAMETHASONE SODIUM PHOSPHATE 8 MG: 4 INJECTION, SOLUTION INTRA-ARTICULAR; INTRALESIONAL; INTRAMUSCULAR; INTRAVENOUS; SOFT TISSUE at 12:12

## 2022-12-15 RX ADMIN — OXYCODONE HYDROCHLORIDE 5 MG: 5 TABLET ORAL at 03:12

## 2022-12-15 RX ADMIN — GLYCOPYRROLATE 0.8 MG: 0.2 INJECTION, SOLUTION INTRAMUSCULAR; INTRAVITREAL at 01:12

## 2022-12-15 RX ADMIN — FENTANYL CITRATE 25 MCG: 50 INJECTION, SOLUTION INTRAMUSCULAR; INTRAVENOUS at 12:12

## 2022-12-15 RX ADMIN — ACETAMINOPHEN 1000 MG: 10 INJECTION, SOLUTION INTRAVENOUS at 12:12

## 2022-12-15 RX ADMIN — MIDAZOLAM 2 MG: 1 INJECTION INTRAMUSCULAR; INTRAVENOUS at 09:12

## 2022-12-15 RX ADMIN — BUPIVACAINE HYDROCHLORIDE 20 ML: 2.5 INJECTION, SOLUTION EPIDURAL; INFILTRATION; INTRACAUDAL at 09:12

## 2022-12-15 RX ADMIN — FENTANYL CITRATE 50 MCG: 50 INJECTION, SOLUTION INTRAMUSCULAR; INTRAVENOUS at 01:12

## 2022-12-15 RX ADMIN — LIDOCAINE HYDROCHLORIDE 100 MG: 20 INJECTION, SOLUTION INTRAVENOUS at 10:12

## 2022-12-15 RX ADMIN — EPHEDRINE SULFATE 10 MG: 50 INJECTION, SOLUTION INTRAMUSCULAR; INTRAVENOUS; SUBCUTANEOUS at 11:12

## 2022-12-15 RX ADMIN — FENTANYL CITRATE 100 MCG: 50 INJECTION, SOLUTION INTRAMUSCULAR; INTRAVENOUS at 10:12

## 2022-12-15 RX ADMIN — OXYCODONE HYDROCHLORIDE 5 MG: 5 TABLET ORAL at 01:12

## 2022-12-15 RX ADMIN — NEOSTIGMINE METHYLSULFATE 5 MG: 1 INJECTION INTRAVENOUS at 01:12

## 2022-12-15 NOTE — PT/OT/SLP PROGRESS
"Physical Therapy Crutch Evaluation/Training    Terri Summers   MRN: [id number]   Admitting Diagnosis: <principal problem not specified>    PT Received On: 12/15/22  PT Start Time: 1550     PT Stop Time: 1615    PT Total Time (min): 25 min       Billable Minutes:  Evaluation 10 and Gait Training 15      Order: PT Eval and Treat  Order Date: 12/15/22    Precautions Weight Bearing Status: weight bearing as tolerated: right leg    Patient Active Problem List   Diagnosis    ADHD (attention deficit hyperactivity disorder), combined type    Anxiety    Pain    Stiffness in joint    Generalized anxiety disorder with panic attacks    Decreased functional mobility and endurance    Quadriceps weakness     Past Medical History:   Diagnosis Date    ADHD (attention deficit hyperactivity disorder)     Anxiety     History of psychiatric care     Hypertension     Psychiatric exam requested by authority     Psychiatric problem     Therapy      Past Surgical History:   Procedure Laterality Date     SECTION      DILATION AND CURETTAGE OF UTERUS  2008    missed ab    REPAIR OF LIGAMENT Right     thumb       Subjective Information     Prior level of function: independent  Residence:  pt lives with boyfriend  1-story house/ trailer, tub-shower, 8" step to enter    Support available:  boyfriend   Equipment owned: None  Mental Status: Oriented X 4 and Alert  Skin: Impaired: RLE post-op, brace locked into extension and ACE bandaging in place   Sensation: no numbness/tingling reported   Posture: Good  Hearing: Intact  Vision:  Intact    Pain at time of assessment: 8/10 located in right leg    Objective findings/Assessment  Bed mobility: SBA-CGA for RLE for supine<>sit  Transfers: CGA-SBA for sit<>stand with crutches  Gross assessment: WFL  Standing balance: Good  ROM: RLE in brace locked into extension  Strength: WFL upon functional assessment; RLE NT in brace locked in extension   Patient requires crutch fitting and " training.    Treatment  Gait: Pt ambulated ~20 ft and ~10 ft with crutches and CGA progressed to SBA; pt educated on 3 pt gait sequencing with good carryover and WBAT RLE  Stairs: PT demonstrated step negotiation with crutches or with B HHA from boyfriend with proper sequencing   Transfers: CGA-SBA for sit<>stand with crutches; CGA for sit<>stand from toilet with crutches and grab bar  Therapeutic Exercise: N/A  Education provided in form of: verbal instruction and visual demonstration; pt educated on benefits of shower chair/transfer tub bench and BSC over toilet; Pt educated on ice/rest/elevation, WBAT RLE with brace locked into extension, adaptive dsg post surgery, home safety, car/toilet/shower/tub t/fs, use of DME for functional mobility and ADLs. Gait belt provided for safe entry of home.       AM-PAC 6 CLICK MOBILITY  How much help from another person does this patient currently need?   1 = Unable, Total/Dependent Assistance  2 = A lot, Maximum/Moderate Assistance  3 = A little, Minimum/Contact Guard/Supervision  4 = None, Modified [place]/Independent    Turning over in bed (including adjusting bedclothes, sheets and blankets)?: 4  Sitting down on and standing up from a chair with arms (e.g., wheelchair, bedside commode, etc.): 3  Moving from lying on back to sitting on the side of the bed?: 3  Moving to and from a bed to a chair (including a wheelchair)?: 3  Need to walk in hospital room?: 3  Climbing 3-5 steps with a railing?: 3  Basic Mobility Total Score: 19     AM-PAC Raw Score CMS G-Code Modifier Level of Impairment Assistance   6 % Total / Unable   7 - 9 CM 80 - 100% Maximal Assist   10 - 14 CL 60 - 80% Moderate Assist   15 - 19 CK 40 - 60% Moderate Assist   20 - 22 CJ 20 - 40% Minimal Assist   23 CI 1-20% SBA / CGA   24 CH 0% Independent/ Mod I     Goals/Discharge Status  Patient safely and effectively ambulates with crutches with  standy by assistance,  weight bearing as tolerated: right  leg on level surfaces.    Recommended Plan:  Patient to be discharged to home with significant other support. Recommending TTB/SC and BSC over toilet. Pt to d/c this date with OP PT appt tomorrow's date.     12/15/2022

## 2022-12-15 NOTE — BRIEF OP NOTE
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 RECONSTRUCTION, KNEE, ACL, ARTHROSCOPIC WITH QUADRICEPS AUTOGRAFT 2. MQTFL RECONSTRUCTION WITH SEMITENDINOSIS ALLOGRAFT (Right)    Anesthesia: General/Regional    Operative Findings: see op note    Estimated Blood Loss: <50ml         Specimens:   Specimen (24h ago, onward)      None              Discharge Note    OUTCOME: Patient tolerated treatment/procedure well without complication and is now ready for discharge.    DISPOSITION: Home or Self Care    FINAL DIAGNOSIS:  <principal problem not specified>    FOLLOWUP: In clinic    DISCHARGE INSTRUCTIONS:    Discharge Procedure Orders   Diet Adult Regular     Notify your health care provider if you experience any of the following:  temperature >100.4     Notify your health care provider if you experience any of the following:  persistent nausea and vomiting or diarrhea     Notify your health care provider if you experience any of the following:  severe uncontrolled pain     Weight bearing restrictions (specify):   Order Comments: WBAT with crutches and knee brace locked in extension     I have reviewed the notes, assessments, and/or procedures performed by Dr. Bustillo, I concur with her/his documentation of Terri Summers.

## 2022-12-15 NOTE — ANESTHESIA PROCEDURE NOTES
Peripheral Block    Patient location during procedure: pre-op   Block not for primary anesthetic.  Reason for block: at surgeon's request and post-op pain management   Post-op Pain Location: Postop Pain right knee   Start time: 12/15/2022 9:02 AM  Timeout: 12/15/2022 9:00 AM   End time: 12/15/2022 9:06 AM    Staffing  Authorizing Provider: Stoney Schumacher II, MD  Performing Provider: Stoney Schumacher II, MD    Preanesthetic Checklist  Completed: patient identified, IV checked, site marked, risks and benefits discussed, surgical consent, monitors and equipment checked, pre-op evaluation and timeout performed  Peripheral Block  Patient position: supine  Prep: ChloraPrep  Patient monitoring: heart rate, cardiac monitor, continuous pulse ox and frequent blood pressure checks  Block type: I PACK  Laterality: right  Injection technique: single shot  Needle  Needle type: Stimuplex   Needle gauge: 21 G  Needle length: 4 in  Needle localization: anatomical landmarks and ultrasound guidance   -ultrasound image captured on disc.  Assessment  Injection assessment: negative aspiration, negative parasthesia and local visualized surrounding nerve  Paresthesia pain: none  Heart rate change: no  Slow fractionated injection: yes    Medications:    Medications: bupivacaine (pf) (MARCAINE) injection 0.25% - Perineural   20 mL - 12/15/2022 9:05:00 AM    Additional Notes  100 mcg precedex added to injectate

## 2022-12-15 NOTE — DISCHARGE INSTRUCTIONS
ANESTHESIA  -For the first 24 hours after surgery:  Do not drive, use heavy equipment, make important decisions, or drink alcohol  -It is normal to feel sleepy for several hours.  Rest until you are more awake.  -Have someone stay with you, if needed.  They can watch for problems and help keep you safe.  -Some possible post anesthesia side effects include: nausea and vomiting, sore throat and hoarseness, sleepiness, and dizziness.    PAIN  -If you have pain after surgery, pain medicine will help you feel better.  Take it as directed, before pain becomes severe.  Most pain relievers taken by mouth need at least 20-30 minutes to start working.  -Do not drive or drink alcohol while taking pain medicine.  -Pain medication can upset your stomach.  Taking them with a little food may help.  -Other ways to help control pain: elevation, ice, and relaxation  -Call your surgeon if still having unmanageable pain an hour after taking pain medicine.  -Pain medicine can cause constipation.  Taking an over-the counter stool softener while on prescription pain medicine and drinking plenty of fluids can prevent this side effect.  -Call your surgeon if you have severe side effects like: breathing problems, trouble waking up, dizziness, confusion, or severe constipation.    NAUSEA  -Some people have nausea after surgery.  This is often because of anesthesia, pain, pain medicine, or the stress of surgery.  -Do not push yourself to eat.  Start off with clear liquids and soup.  Slowly move to solid foods.  Don't eat fatty, rich, spicy foods at first.  Eat smaller amounts.  -If you develop persistent nausea and vomiting please notify your surgeon immediately.    BLEEDING  -Different types of surgery require different types of care and dressing changes.  It is important to follow all instructions and advice from your surgeon.  Change dressing as directed.  Call your surgeon for any concerns regarding postop bleeding.    SIGNS OF  INFECTION  -Signs of infection include: fever, swelling, drainage, and redness  -Notify your surgeon if you have a fever of 100.4 F (38.0 C) or higher.  -Notify your surgeon if you notice redness, swelling, increased pain, pus, or a foul smell at the incision site    Ok to remove the dressing in 2 days. Apply bandaids and continue to wear the brace when out of bed.

## 2022-12-15 NOTE — ANESTHESIA PROCEDURE NOTES
Peripheral Block    Patient location during procedure: pre-op   Block not for primary anesthetic.  Reason for block: at surgeon's request and post-op pain management   Post-op Pain Location: Right Knee   Start time: 12/15/2022 9:02 AM  Timeout: 12/15/2022 9:00 AM   End time: 12/15/2022 9:06 AM    Staffing  Authorizing Provider: Stoney Schumacher II, MD  Performing Provider: Stoney Schumacher II, MD    Preanesthetic Checklist  Completed: patient identified, IV checked, site marked, risks and benefits discussed, surgical consent, monitors and equipment checked, pre-op evaluation and timeout performed  Peripheral Block  Patient position: supine  Prep: ChloraPrep  Patient monitoring: heart rate, cardiac monitor, continuous pulse ox and frequent blood pressure checks  Block type: adductor canal  Laterality: right  Injection technique: single shot  Needle  Needle type: Stimuplex   Needle gauge: 21 G  Needle length: 4 in  Needle localization: anatomical landmarks and ultrasound guidance   -ultrasound image captured on disc.  Assessment  Injection assessment: negative aspiration, negative parasthesia and local visualized surrounding nerve  Paresthesia pain: none  Heart rate change: no  Slow fractionated injection: yes    Medications:    Medications: bupivacaine (pf) (MARCAINE) injection 0.25% - Perineural   20 mL - 12/15/2022 9:06:00 AM

## 2022-12-15 NOTE — ANESTHESIA PROCEDURE NOTES
Intubation    Date/Time: 12/15/2022 10:11 AM  Performed by: Massiel Barber CRNA  Authorized by: Shan Crystal MD     Intubation:     Induction:  Intravenous    Intubated:  Postinduction    Mask Ventilation:  Easy mask    Attempts:  1    Attempted By:  CRNA    Method of Intubation:  Video laryngoscopy    Blade:  Sorensen 3    Laryngeal View Grade: Grade I - full view of cords      Difficult Airway Encountered?: No      Complications:  None    Airway Device:  Oral endotracheal tube    Airway Device Size:  7.5    Style/Cuff Inflation:  Cuffed (inflated to minimal occlusive pressure)    Tube secured:  22    Secured at:  The teeth    Placement Verified By:  Capnometry    Complicating Factors:  None    Findings Post-Intubation:  BS equal bilateral and atraumatic/condition of teeth unchanged

## 2022-12-15 NOTE — ANESTHESIA POSTPROCEDURE EVALUATION
Anesthesia Post Evaluation    Patient: Terri Summers    Procedure(s) Performed: Procedure(s) (LRB):  1. RECONSTRUCTION, KNEE, ACL, ARTHROSCOPIC WITH QUADRICEPS AUTOGRAFT 2. MQTFL RECONSTRUCTION WITH SEMITENDINOSIS ALLOGRAFT (Right)    Final Anesthesia Type: general      Patient location during evaluation: PACU  Patient participation: Yes- Able to Participate  Level of consciousness: awake and alert  Post-procedure vital signs: reviewed and stable  Pain management: adequate  Airway patency: patent    PONV status at discharge: No PONV  Anesthetic complications: no      Cardiovascular status: blood pressure returned to baseline  Respiratory status: unassisted  Hydration status: euvolemic  Follow-up not needed.          Vitals Value Taken Time   /57 12/15/22 1519   Temp 36.7 °C (98.1 °F) 12/15/22 1319   Pulse 54 12/15/22 1520   Resp 15 12/15/22 1520   SpO2 95 % 12/15/22 1520   Vitals shown include unvalidated device data.      No case tracking events are documented in the log.      Pain/Shara Score: Pain Rating Prior to Med Admin: 8 (12/15/2022  3:13 PM)  Shara Score: 10 (12/15/2022  9:10 AM)

## 2022-12-15 NOTE — TRANSFER OF CARE
"Anesthesia Transfer of Care Note    Patient: Terri Summers    Procedure(s) Performed: Procedure(s) (LRB):  1. RECONSTRUCTION, KNEE, ACL, ARTHROSCOPIC WITH QUADRICEPS AUTOGRAFT 2. MQTFL RECONSTRUCTION WITH SEMITENDINOSIS ALLOGRAFT (Right)    Patient location: PACU    Anesthesia Type: general    Transport from OR: Transported from OR on 6-10 L/min O2 by face mask with adequate spontaneous ventilation    Post pain: adequate analgesia    Post assessment: no apparent anesthetic complications and tolerated procedure well    Post vital signs: stable    Level of consciousness: awake    Nausea/Vomiting: no nausea/vomiting    Complications: none    Transfer of care protocol was followed      Last vitals:   Visit Vitals  BP (!) 111/53   Pulse 68   Temp 36.7 °C (98.1 °F) (Skin)   Resp 18   Ht 5' 6" (1.676 m)   Wt 82.5 kg (181 lb 14.1 oz)   SpO2 100%   Breastfeeding No   BMI 29.36 kg/m²     "

## 2022-12-15 NOTE — PLAN OF CARE
POC board updated and reviewed with pt and pt's family. Pt and pt's family verbalize understanding. Safety precautions maintained. Call bell in reach.  Bed locked and in lowest position. Instructed pt to call for assistance. Pt verbalizes understanding.

## 2022-12-15 NOTE — H&P
Patient ID:   Terri Summers is a 37 y.o. female.     Chief Complaint:   Follow-up evaluation for right knee injury     HPI:   Previous: Mrs. Summers is returning for evaluation of the right knee. She recently completed MRI scan of the right knee. She reports 6/10 pain. She has been wearing a lateral J brace. She denies any subsequent patellar instability events.      At last visit discussed nonop vs op and after thinking it over patient elected to proceed with surgery.  Medications:     Current Outpatient Medications:     ALPRAZolam (XANAX) 0.5 MG tablet, Take 1 tablet (0.5 mg total) by mouth daily as needed for Anxiety., Disp: 30 tablet, Rfl: 5    [START ON 11/6/2022] dextroamphetamine-amphetamine (ADDERALL) 30 mg Tab, Take 1 tablet (30 mg total) by mouth 2 (two) times daily., Disp: 60 tablet, Rfl: 0    dextroamphetamine-amphetamine 30 mg Tab, Take 1 tablet (30 mg total) by mouth 2 (two) times daily., Disp: 60 tablet, Rfl: 0    [START ON 12/6/2022] dextroamphetamine-amphetamine 30 mg Tab, Take 1 tablet (30 mg total) by mouth 2 (two) times daily., Disp: 60 tablet, Rfl: 0    ergocalciferol (ERGOCALCIFEROL) 50,000 unit Cap, Take 50,000 Units by mouth every 7 days., Disp: , Rfl:     fluconazole (DIFLUCAN) 150 MG Tab, TAKE 1 TABLET BY MOUTH 1 TIME FOR 1 DOSE AS NEEDED FOR YEAST INFECTION, Disp: , Rfl:     ibuprofen (ADVIL,MOTRIN) 800 MG tablet, TK 1 T PO BID WF PRF PAIN, Disp: , Rfl: 0    losartan (COZAAR) 50 MG tablet, losartan 50 mg tablet  Take 1 tablet every day by oral route., Disp: , Rfl:     predniSONE (DELTASONE) 20 MG tablet, Take 20 mg by mouth 2 (two) times daily., Disp: , Rfl:      Allergies:       Review of patient's allergies indicates:   Allergen Reactions    Hydrochlorothiazide           Past Medical History:       Past Medical History:   Diagnosis Date    ADHD (attention deficit hyperactivity disorder)      Anxiety      History of psychiatric care      Hypertension      Psychiatric exam requested by  "authority      Psychiatric problem      Therapy           Past Surgical History:        Past Surgical History:   Procedure Laterality Date     SECTION        DILATION AND CURETTAGE OF UTERUS   2008     missed ab    REPAIR OF LIGAMENT Right       thumb         Social History:  Social History            Occupational History    Not on file   Tobacco Use    Smoking status: Every Day    Smokeless tobacco: Never   Substance and Sexual Activity    Alcohol use: Yes       Comment: rare drink occasionally    Drug use: No       Comment: has abused THC, LSD, 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    Sexual activity: Yes       Partners: Male       Birth control/protection: I.U.D.         Family History:        Family History   Problem Relation Age of Onset    ADD / ADHD Father      Ovarian cancer Mother      Ovarian cancer Maternal Grandmother      Breast cancer Neg Hx      Colon cancer Neg Hx           ROS:  Review of Systems   Musculoskeletal:  Positive for falls, joint pain, joint swelling, muscle weakness and myalgias.   Neurological:  Negative for numbness and paresthesias.   All other systems reviewed and are negative.     Vitals:  BP (!) 149/85 (BP Location: Left arm, Patient Position: Sitting, BP Method: Large (Automatic))   Pulse 109   Ht 5' 6" (1.676 m)   Wt 81.8 kg (180 lb 5.4 oz)   BMI 29.11 kg/m²      Physical Examination:  Comprehensive Orthopaedic Musculoskeletal Exam     General      Constitutional: appears stated age, well-developed and well-nourished    Scleral icterus: no    Labored breathing: no    Psychiatric: normal mood and affect and no acute distress    Neurological: alert and oriented x3    Skin: intact    Lymphadenopathy: none   Ortho Exam   Right knee exam:  Minimal effusion.   Tenderness along the medial patella.   ROM: 0-130  Stability: patellar translation 3 quadrants with apprehension; side tos shaila difference in Lachmans with 1B on right and 0 on left. Positive pivot shift. No collateral instability. " Negative posterior drawer     Imaging:  I have independently reviewed the following imaging studies performed at [facility]:     MRI Knee Without Contrast Right  Narrative: EXAMINATION:  MRI KNEE WITHOUT CONTRAST RIGHT     CLINICAL HISTORY:  Knee trauma, dislocation suspected (Age >= 5y);Unspecified dislocation of right patella, initial encounter     TECHNIQUE:  Multiplanar, multisequence images were performed of the right knee.     COMPARISON:  X-ray 10/18/2022     FINDINGS:  Menisci:Medial and lateral menisci are normal.     Ligaments:ACL torn at the femoral attachment.  The PCL, MCL, and lateral collateral ligament complexes are intact.     Extensor Mechanism:Quadriceps and patellar tendons are intact.     Bone and Joint including articular cartilage:There is tricompartmental superficial chondromalacia with no focal high-grade articular cartilage defect.  Findings are most severe involving the patellofemoral.  There is some lateral subluxation of the patella but the retinacular structures appear intact.  No evidence of osteochondral fractures or bone bruises involving the medial patella and lateral trochlea.  There is a large joint effusion with mildly thickened medial plica.  TT-TG distance is approximately 7 mm.     Soft Tissues:Unremarkable.     Miscellaneous:None  Impression: Negative for internal derangement.     Lateral subluxation the patella without augustine dislocation or stigmata of recent lateral patellar dislocation.  Normal TT-TG distance     ACL tear which may be chronic.     Nonspecific joint effusion and low to intermediate grade chondromalacia patella.     Electronically signed by:         Burak Vieira Jr  Date:                                        10/26/2022  Time:                                       08:35        Assessment:  1. Traumatic dislocation of patella, right, subsequent encounter    2. 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 I discussed treatment options including non-operative management and operative reconstruction.   She would like to proceed w/ Knee arthroscopy with ACL reconstruction with Quad Tendon Autograft and MQTFL reconstruction with allograft.    I have reviewed the H&P. There are no interval changes to report.

## 2022-12-15 NOTE — OP NOTE
Facility:  [facility]    Date of Surgery:  12/15/22    Surgeon:  Shan Crystal MD    First Assistant:  Mathew Valiente MD    Second Assistant:  Nick Bustillo MD    Anesthesia:  GETA + Adductor Canal    Pre-operative Diagnosis:  Right complete ACL tear  Right recurrent patellar instability    Indication:  Improve pain and stability    Post-operative Diagnosis:  Right complete ACL tear  Right posterior horn lateral meniscus tear  Right recurrent patellar instability    Procedure:  Right ACL reconstruction with quadriceps autograft   Right all-inside lateral meniscus repair  Right medial quadriceps tendon reconstruction with semitendinosis allograft     Findings:  1. Lateral patellar tracking with shallow trochlear groove  2. ACL rupture  3. Posterior horn lateral meniscus  4. Grade III lateral tibial plateau     The patient was identified in the pre-operative holding area. The correct extremity was marked and written informed consent was verified. A pre-op adductor canal block was performed. The patient was transferred to the OR. The patient was placed on the OR table. General anesthesia was administered. An examination under anesthesia was performed.     Examination under anesthesia:  ROM 0-140. Lachmans 2B, 2+ pivot shift. Negative posterior drawer. Negative varus and valgus stress. Negative Dial test. Patellar translation 3 quadrants.     A tourniquet was placed on the thigh. The thigh was secured in a leg cortez. The operative extremity was prepped and draped in the usual sterile fashion. A surgical timeout was performed to verify the correct extremely and administration of IV antibiotics withing 30 minutes of surgery start time.     The extremity was exsanguinated. The tourniquet was inflated to 250 mm Hg. A standard anterolateral portal was made. The scope was placed into the patellofemoral compartment. The trochlear groove was shallow. The patella tracked laterally. The chondral  surfaces were smooth. No loose bodies were seen in the gutters. The scope was advanced into the medial compartment. The anteromedial portal was established. The medial meniscus was intact. The medial compartment cartilage was smooth. The scope was advanced into the notch where the ACL was completely torn from the femur. The PCL was intact. The scope was advanced into the lateral compartment. There was a peripheral longitudinal tear of the posterior horn of the lateral meniscus in the red white zone. The tear measured 8mm. The tear site was rasped to create a bleeding surface. Using the Page-Nephew Novostitch, 2 circumferential compression stitches were placed and tied to repair the meniscus.      Attention was turned to quadriceps graft harvest. A 5 cm incision was made over the distal quadriceps. A partial thickness flap of quadriceps tendon was elevated off the superior pole of the patella. A stitch was placed in the end of the tendon. The Ar[employer]rex GraftPro quad harvester (10mm) was used to harvest an 80mm strip of quadriceps tendon. The tendon was placed on the back table and prepared with an ACL TightRope button. The scope was placed back into the joint. The ACL remnants were removed. A notchplasty was performed. A curved AM guide was placed through the AM portal and a flexible Beath pin was passed through the femur and out the anterolateral thigh. The guidepin position was excellent. An 8 mm femoral socket was made in the femur to a depth of 30mm. The Beath pin was used to pass a passing suture through the femoral tunnel. An ACL guide set at 55 degrees was placed onto the ACL footprint and a guide pin was placed. A 8.5 mm tibial tunnel was drilled. The passing suture was retrieved and used to pass the graft into the tibial and femoral tunnels. 20 mm of graft was passed into the femoral tunnel. The knee was brought into full extension and no impingement was seen. The knee was cycled 15 times with tension applied  on the graft. The knee was brought into 20 degrees of flexion, a posterior drawer was applied, and a 10mm x 30mm Conmed biocomposite interference screw was used to secure fixation of the graft on the tibial side. Lachmans exam was zero. The scope was placed in the graft and had excellent tension. The scope was removed.     A 4cm incision was made over the medial epicondyle. Dissection was performed down to the adductor tendon. The adductor tubercle was identified. A location just anterior to adductor tubercle was marked. A semitendinosis graft was secured at this point with a Conmed Tenolok anchor with excellent fixation. The graft was next tunneled beneath the vastus medialis but superficial to the joint capsule. The graft was retrieved through a slit in the quadriceps just above the superior patella. The graft was folded over itself and tension was applied. The knee was placed in 30 degrees of flexion and the graft was tied into the quadriceps until there was 1 quadrant of lateral patellar translation and arthroscopic visualization of patellar tracking was excellent. Multiple No. 2 Fiberwire sutures were placed to secure the graft and to close the quadriceps defect.     The wounds were irrigated. The wounds were closed with 2-0 vicryl and then monocryl/steri-strips. Portal incisions were closed with 3-0 nylon.      A sterile dressing was placed. The patient was awakened and transferred to the PACU in stable condition.     EBL:  Less than 50 cc    Drains:  None    Complications:  None known

## 2022-12-16 ENCOUNTER — CLINICAL SUPPORT (OUTPATIENT)
Dept: REHABILITATION | Facility: HOSPITAL | Age: 37
End: 2022-12-16
Payer: MEDICAID

## 2022-12-16 VITALS
HEIGHT: 66 IN | RESPIRATION RATE: 17 BRPM | WEIGHT: 181.88 LBS | HEART RATE: 61 BPM | DIASTOLIC BLOOD PRESSURE: 67 MMHG | SYSTOLIC BLOOD PRESSURE: 117 MMHG | OXYGEN SATURATION: 96 % | TEMPERATURE: 98 F | BODY MASS INDEX: 29.23 KG/M2

## 2022-12-16 DIAGNOSIS — Z74.09 DECREASED FUNCTIONAL MOBILITY AND ENDURANCE: Primary | ICD-10-CM

## 2022-12-16 DIAGNOSIS — M62.81 QUADRICEPS WEAKNESS: ICD-10-CM

## 2022-12-16 PROCEDURE — 97110 THERAPEUTIC EXERCISES: CPT | Mod: PN | Performed by: PHYSICAL THERAPIST

## 2022-12-16 PROCEDURE — 97164 PT RE-EVAL EST PLAN CARE: CPT | Mod: PN | Performed by: PHYSICAL THERAPIST

## 2022-12-16 NOTE — PROGRESS NOTES
[facility]   Physical Therapy Treatment Note     Name: Terri Summers  Clinic Number: [id number]    Therapy Diagnosis:   Encounter Diagnoses   Name Primary?    Decreased functional mobility and endurance Yes    Quadriceps weakness      Physician: Shan Crystal MD    Visit Date: 12/16/2022    Physician Orders: PT Eval and Treat      Medical Diagnosis from Referral:   S83.004D (ICD-10-CM) - Traumatic dislocation of patella, right, subsequent encounter   S83.511A (ICD-10-CM) - Chronic rupture of ACL of right knee      Evaluation Date: 11/22/2022  Plan of Care Expiration: 12/31/22  Progress Note Due: 12/28/22  Visit # / Visits authorized: 2/ 20   FOTO: 1/ 5 (new one given post-op)  PTA Visit: 0/5    Precautions: standard, post-op ACL with quad tendon autograph protocol; NO LOADED FLEXION PAST 90 x 4 months    Time In: 10:10 AM  Time Out: 11:00 AM  Total Billable Time: 45 minutes (RE-eval, TE)    SUBJECTIVE     Pt reports: surgery went well. She was instructed to take the bandaging off tomorrow. Her foot still feels numb from surgery. Her plan is to work from home initially and is unsure when she will be returning to her office. .  She  was not provided with an home exercise program due to surgery    Response to previous treatment: post-op re-eval  Functional change: post-op re-eval    Pain: 5/10  Location: right knee      OBJECTIVE     See re-assessment on 12/16/22    Date of surgery: 12/15/22 by Dr. Crystal  Procedure:  Right ACL reconstruction with quadriceps autograft   Right all-inside lateral meniscus repair  Right medial quadriceps tendon reconstruction with semitendinosis allograft     Treatment     Terri received the treatments listed below:      therapeutic exercises to develop strength, endurance, ROM, flexibility, posture, and core stabilization for 18 minutes including:    Quad set with towel roll under knee with NMES: 5 minutes   Quad set with towel roll under ankle with  "NMES: 5 minutes   Seated AAROM knee flexion at edge of table: 10x5" holds  Ankle pumps: 10x    Next:  Hip abduction straight leg raise  Hip extension straight leg raise  Hip adduction straight leg raise  Straight leg raise (with brace locked in extension)      supervised modalities after being cleared for contradictions: NMES Electrical Stimulation:  Terri received NMES Electrical Stimulation to elicit muscle contraction of the right quad. Pt received stimulation with symmetric current, ramp of 3 seconds with 10 second on time and 5 second off time. Patient tolerated treatment well without any adverse effects.       Patient Education and Home Exercises     Home Exercises Provided and Patient Education Provided     Education provided:   - use of and fitting of hinge brace  - benefits of NMES  - blood flow restriction therapy    Written Home Exercises Provided: yes. Exercises were reviewed and Terri was able to demonstrate them prior to the end of the session.  Terri demonstrated good  understanding of the education provided. See EMR under Patient Instructions for exercises provided during therapy sessions    ASSESSMENT     Terri arrives day 1 status post ACL repair with quad tendon autograph and MQTFL reconstruction with semitendinosis allograph. She arrives with Bilateral axillary crutches following WBAT status. Prior to surgery, she had chronic lateral patellar dislocations. She arrives with expected edema, fair quad contraction with quad sets and good knee extension (already with 5 degree of hyperextension). Utilized NMES for quad activation today and patient would benefit from home NMES unit for quad activation. She's currently unable to work, but can start working from home next week. She's unable to drive and is limited in all ADLs and IADLs. She is appropriate for skilled PT to help her reach her goals.    Terri Is progressing well towards her goals.   Pt prognosis is Good.     Pt will continue to benefit from " skilled outpatient physical therapy to address the deficits listed in the problem list box on initial evaluation, provide pt/family education and to maximize pt's level of independence in the home and community environment.     Pt's spiritual, cultural and educational needs considered and pt agreeable to plan of care and goals.     Anticipated barriers to physical therapy: chronic nature of symptoms    Goals:   Short Term Goals: 6 weeks   1.  Patient will demonstrate right knee ROM at least 5-0-120 degree.  2.  Patient will demonstrate right quad strength by performing 3x10 of active straight leg raise without lag.   3.  Patient will report worst right knee pain < 4/10 to improve ADL performance.     Long Term Goals: 16 weeks   1.  Patient will demonstrate ability to perform > 12 sit <> stand transfers w/o UE use for improved ability to stand from low surfaces.  2.  Patient will demonstrate right quad strength via MicroFET of > 27 kg to improve transfers, gait and ADL performance.  3.  Patient will demonstrate to PT comprehensive understanding of each HEP component without verbal cueing.   4.  Patient will improve FOTO to < 43% to improve ADL performance.  5.  Patient will demonstrate ability to negotiate a flight of stairs with reciprocal pattern, no handrail and no compensation or symptoms.    PLAN     Cont with POC per ACL quad tendon protocol  Currently phase I    Shan Dolan, PT

## 2022-12-16 NOTE — PLAN OF CARE
Outpatient Therapy Updated Plan of Care     Visit Date: 12/16/2022  Name: Terri Summers  Clinic Number: [id number]    Therapy Diagnosis:   Encounter Diagnoses   Name Primary?    Decreased functional mobility and endurance Yes    Quadriceps weakness      Physician: Shan Crystal MD     Physician Orders: PT Eval and Treat      Medical Diagnosis from Referral:   S83.004D (ICD-10-CM) - Traumatic dislocation of patella, right, subsequent encounter   S83.511A (ICD-10-CM) - Chronic rupture of ACL of right knee      Evaluation Date: 11/22/2022  Plan of Care Expiration: 12/31/22  Progress Note Due: 12/28/22  Visit # / Visits authorized: 2/ 20   FOTO: 1/ 5 (new one given post-op)  PTA Visit: 0/5     Subjective      Update: surgery went well. She was instructed to take the bandaging off tomorrow. Her foot still feels numb from surgery. Her plan is to work from home initially and is unsure when she will be returning to her office.      Objective      Update:      Date of surgery: 12/15/22 by Dr. Crystal  Procedure:  Right ACL reconstruction with quadriceps autograft   Right all-inside lateral meniscus repair  Right medial quadriceps tendon reconstruction with semitendinosis allograft         Range of Motion:   Knee AAROM:   Left: 12-0-145  Right: 5-0-51 degree      Lower Extremity Strength     All below testing performed in seated position. Gait belt used for quadriceps testing.     Lower extremity strength with [employer] handheld dynamometer Right (pre-op) Left Pain/dysfunction with movement   (approx 4 sec hold w/ max contraction)   Hip flexion 13.1 kg  16.7 kg  supine   Hip abduction 10.3 kg  14.4 kg  supine   Quadriceps 18.4 kg  35.9 kg      Hamstrings 13.4 kg  23.5 kg         Strength not tested today on 12/16 due to being post-op     Sensation: intact light touch sensation to BLE throughout L2-S2 dermatomal pattern                Edema:   Girth Measurement 15 cm above   Left 52 cm   Right 54.5 cm         Assessment       Update: Terri arrives day 1 status post ACL repair with quad tendon autograph and MQTFL reconstruction with semitendinosis allograph. She arrives with Bilateral axillary crutches following WBAT status. Prior to surgery, she had chronic lateral patellar dislocations. She arrives with expected edema, fair quad contraction with quad sets and good knee extension (already with 5 degree of hyperextension). Utilized NMES for quad activation today and patient would benefit from home NMES unit for quad activation. She's currently unable to work, but can start working from home next week. She's unable to drive and is limited in all ADLs and IADLs. She is appropriate for skilled PT to help her reach her goals.     Short Term Goals: 6 weeks   1.  Patient will demonstrate right knee ROM at least 5-0-120 degree.  2.  Patient will demonstrate right quad strength by performing 3x10 of active straight leg raise without lag.   3.  Patient will report worst right knee pain < 4/10 to improve ADL performance.     Long Term Goals: 16 weeks   1.  Patient will demonstrate ability to perform > 12 sit <> stand transfers w/o UE use for improved ability to stand from low surfaces.  2.  Patient will demonstrate right quad strength via MicroFET of > 27 kg to improve transfers, gait and ADL performance.  3.  Patient will demonstrate to PT comprehensive understanding of each HEP component without verbal cueing.   4.  Patient will improve FOTO to < 43% to improve ADL performance.  5.  Patient will demonstrate ability to negotiate a flight of stairs with reciprocal pattern, no handrail and no compensation or symptoms.     Plan      Updated Certification Period: 12/16/2022 to 2/10/23  Recommended Treatment Plan: 2 times per week for 8 weeks: Electrical Stimulation TENS, IFC, NMES, Gait Training, Manual Therapy, Moist Heat/ Ice, Neuromuscular Re-ed, Patient Education, Self Care, Therapeutic Activities, Therapeutic Exercise, and dry  needling  Other Recommendations: per ACL quad tendon protocol     Shan Dolan, PT  12/16/2022        I CERTIFY THE NEED FOR THESE SERVICES FURNISHED UNDER THIS PLAN OF TREATMENT AND WHILE UNDER MY CARE     Physician's comments:           Physician's Signature: ___________________________________________________    I certify the need for these services furnished under this plan of treatment and while under my care.        Shan Crystal MD, FAAOS  , Orthopaedic Sports Medicine  Residency   [facility] Department of Orthopaedic Surgery  Assistant Orthopaedic Surgeon, [place] [facility]  Head Team Physician, [place] [facility]

## 2022-12-18 PROBLEM — S83.004D: Status: ACTIVE | Noted: 2022-12-18

## 2022-12-18 PROBLEM — S83.511A RUPTURE OF ANTERIOR CRUCIATE LIGAMENT OF RIGHT KNEE: Status: ACTIVE | Noted: 2022-12-18

## 2022-12-18 PROBLEM — S83.261A PERIPHERAL TEAR OF LATERAL MENISCUS OF RIGHT KNEE AS CURRENT INJURY: Status: ACTIVE | Noted: 2022-12-18

## 2022-12-20 ENCOUNTER — CLINICAL SUPPORT (OUTPATIENT)
Dept: REHABILITATION | Facility: HOSPITAL | Age: 37
End: 2022-12-20
Payer: MEDICAID

## 2022-12-20 DIAGNOSIS — M62.81 QUADRICEPS WEAKNESS: ICD-10-CM

## 2022-12-20 DIAGNOSIS — Z74.09 DECREASED FUNCTIONAL MOBILITY AND ENDURANCE: Primary | ICD-10-CM

## 2022-12-20 PROCEDURE — 97110 THERAPEUTIC EXERCISES: CPT | Mod: PN | Performed by: PHYSICAL THERAPIST

## 2022-12-20 NOTE — PROGRESS NOTES
[facility]   Physical Therapy Treatment Note     Name: Terri Summers  Clinic Number: [id number]    Therapy Diagnosis:   Encounter Diagnoses   Name Primary?    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 She was instructed to take the bandaging off tomorrow. Her foot still feels numb from surgery. Her plan is to work from home initially and is unsure when she will be returning to her office. .  She  was not provided with an home exercise program due to surgery    Response to previous treatment: post-op re-eval  Functional change: post-op re-eval    Pain: 5/10  Location: right knee      OBJECTIVE     See re-assessment on 12/16/22    Date of surgery: 12/15/22 by Dr. Crystal  Procedure:  Right ACL reconstruction with quadriceps autograft   Right all-inside lateral meniscus repair  Right medial quadriceps tendon reconstruction with semitendinosis allograft     Treatment     Terri received the treatments listed below:      therapeutic exercises to develop strength, endurance, ROM, flexibility, posture, and core stabilization for 40 minutes including:    Quad set with towel roll under knee with NMES: 5 minutes   Quad set with  "towel roll under ankle with NMES: 5 minutes   Seated AAROM knee flexion at edge of table: 10x5" holds  Ankle pumps: 2 minutes    Hip abduction straight leg raise  Hip extension straight leg raise  Hip adduction straight leg raise  Straight leg raise (with brace locked in extension): 2x10 (initial minimal A)       supervised modalities after being cleared for contradictions: NMES Electrical Stimulation:  Terri received NMES Electrical Stimulation to elicit muscle contraction of the right quad. Pt received stimulation with symmetric current, ramp of 3 seconds with 10 second on time and 5 second off time. Patient tolerated treatment well without any adverse effects.     TENS unit used to right knee for 10 minutes with cold pack applied for decreased pain and edema at completion of visit      Patient Education and Home Exercises     Home Exercises Provided and Patient Education Provided     Education provided:   - use of and fitting of hinge brace  - benefits of NMES and TENS - units to purchase  - blood flow restriction therapy    Written Home Exercises Provided: yes. Exercises were reviewed and Terri was able to demonstrate them prior to the end of the session.  Terri demonstrated good  understanding of the education provided. See EMR under Patient Instructions for exercises provided during therapy sessions    ASSESSMENT     Terri arrives wk 1 status post ACL repair with quad tendon autograph and MQTFL reconstruction with semitendinosis allograph. She arrives with Bilateral axillary crutches following WBAT status. Prior to surgery, she had chronic lateral patellar dislocations. She presents with improved quad activation, but unable to perform straight leg raise without brace today. With brace, needed minimal assist to initiate, then able to perform. Unfortunately, her insurance does not cover an NMES unit, so she was shown examples to purchase, which she plans on doing.     Terri Is progressing well towards her goals. "   Pt prognosis is Good.     Pt will continue to benefit from skilled outpatient physical therapy to address the deficits listed in the problem list box on initial evaluation, provide pt/family education and to maximize pt's level of independence in the home and community environment.     Pt's spiritual, cultural and educational needs considered and pt agreeable to plan of care and goals.     Anticipated barriers to physical therapy: chronic nature of symptoms    Goals:   Short Term Goals: 6 weeks   1.  Patient will demonstrate right knee ROM at least 5-0-120 degree. PROGRESSING; NOT MET  2.  Patient will demonstrate right quad strength by performing 3x10 of active straight leg raise without lag.  PROGRESSING; NOT MET  3.  Patient will report worst right knee pain < 4/10 to improve ADL performance. PROGRESSING; NOT MET     Long Term Goals: 16 weeks   1.  Patient will demonstrate ability to perform > 12 sit <> stand transfers w/o UE use for improved ability to stand from low surfaces. PROGRESSING; NOT MET  2.  Patient will demonstrate right quad strength via MicroFET of > 27 kg to improve transfers, gait and ADL performance. PROGRESSING; NOT MET  3.  Patient will demonstrate to PT comprehensive understanding of each HEP component without verbal cueing. PROGRESSING; NOT MET  4.  Patient will improve FOTO to < 43% to improve ADL performance. PROGRESSING; NOT MET  5.  Patient will demonstrate ability to negotiate a flight of stairs with reciprocal pattern, no handrail and no compensation or symptoms. PROGRESSING; NOT MET    PLAN     Cont with POC per ACL quad tendon protocol  Currently phase I    Shan Dolan, PT

## 2022-12-21 ENCOUNTER — PATIENT MESSAGE (OUTPATIENT)
Dept: ORTHOPEDICS | Facility: CLINIC | Age: 37
End: 2022-12-21
Payer: MEDICAID

## 2022-12-22 ENCOUNTER — PATIENT MESSAGE (OUTPATIENT)
Dept: REHABILITATION | Facility: HOSPITAL | Age: 37
End: 2022-12-22

## 2022-12-22 ENCOUNTER — CLINICAL SUPPORT (OUTPATIENT)
Dept: REHABILITATION | Facility: HOSPITAL | Age: 37
End: 2022-12-22
Payer: MEDICAID

## 2022-12-22 DIAGNOSIS — Z74.09 DECREASED FUNCTIONAL MOBILITY AND ENDURANCE: Primary | ICD-10-CM

## 2022-12-22 DIAGNOSIS — M62.81 QUADRICEPS WEAKNESS: ICD-10-CM

## 2022-12-22 PROCEDURE — 97110 THERAPEUTIC EXERCISES: CPT | Mod: PN | Performed by: PHYSICAL THERAPIST

## 2022-12-22 NOTE — PROGRESS NOTES
[facility]   Physical Therapy Treatment Note     Name: Terri Summers  Clinic Number: [id number]    Therapy Diagnosis:   Encounter Diagnoses   Name Primary?    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 She didn't order an NMES unit yet  She  was not provided with an home exercise program due to surgery    Response to previous treatment: post-op re-eval  Functional change: post-op re-eval    Pain: 5/10  Location: right knee      OBJECTIVE     See re-assessment on 12/16/22    Date of surgery: 12/15/22 by Dr. Crystal  Procedure:  Right ACL reconstruction with quadriceps autograft   Right all-inside lateral meniscus repair  Right medial quadriceps tendon reconstruction with semitendinosis allograft     12/22/22:  85 degree knee flexion PROM    Treatment     Terri received the treatments listed below      therapeutic exercises to develop strength, endurance, ROM, flexibility, posture, and core stabilization for 40 minutes including:    Quad set with towel roll under knee with NMES: 5 minutes   Quad set with towel roll under ankle with NMES: 5 minutes   Ankle pumps: 2 minutes    Hip abduction straight leg raise: 3x10  Hip extension  "straight leg raise: 3x10  Hip adduction straight leg raise: 3x10  Straight leg raise (with brace locked in extension): 2x10   Heel slides with straps: 12x5" holds    Weight shifts: 2 minutes       supervised modalities after being cleared for contradictions: NMES Electrical Stimulation:  Terri received NMES Electrical Stimulation to elicit muscle contraction of the right quad. Pt received stimulation with symmetric current, ramp of 3 seconds with 10 second on time and 5 second off time. Patient tolerated treatment well without any adverse effects.         Patient Education and Home Exercises     Home Exercises Provided and Patient Education Provided     Education provided:   - use of and fitting of hinge brace  - benefits of NMES and TENS - units to purchase  - blood flow restriction therapy    Written Home Exercises Provided: yes. Exercises were reviewed and Terri was able to demonstrate them prior to the end of the session.  Terri demonstrated good  understanding of the education provided. See EMR under Patient Instructions for exercises provided during therapy sessions    ASSESSMENT     Terri arrives wk 1 status post ACL repair with quad tendon autograph and MQTFL reconstruction with semitendinosis allograph. She arrives with Bilateral axillary crutches following WBAT status. Prior to surgery, she had chronic lateral patellar dislocations. Quad control continues to improve without any lag with straight leg raise today. Initiated weight shifting today. No limitations of pain today. Knee flexion PROM at 85 degree pain free.    Terri Is progressing well towards her goals.   Pt prognosis is Good.     Pt will continue to benefit from skilled outpatient physical therapy to address the deficits listed in the problem list box on initial evaluation, provide pt/family education and to maximize pt's level of independence in the home and community environment.     Pt's spiritual, cultural and educational needs considered and " pt agreeable to plan of care and goals.     Anticipated barriers to physical therapy: chronic nature of symptoms    Goals:   Short Term Goals: 6 weeks   1.  Patient will demonstrate right knee ROM at least 5-0-120 degree. PROGRESSING; NOT MET  2.  Patient will demonstrate right quad strength by performing 3x10 of active straight leg raise without lag.  PROGRESSING; NOT MET  3.  Patient will report worst right knee pain < 4/10 to improve ADL performance. PROGRESSING; NOT MET     Long Term Goals: 16 weeks   1.  Patient will demonstrate ability to perform > 12 sit <> stand transfers w/o UE use for improved ability to stand from low surfaces. PROGRESSING; NOT MET  2.  Patient will demonstrate right quad strength via MicroFET of > 27 kg to improve transfers, gait and ADL performance. PROGRESSING; NOT MET  3.  Patient will demonstrate to PT comprehensive understanding of each HEP component without verbal cueing. PROGRESSING; NOT MET  4.  Patient will improve FOTO to < 43% to improve ADL performance. PROGRESSING; NOT MET  5.  Patient will demonstrate ability to negotiate a flight of stairs with reciprocal pattern, no handrail and no compensation or symptoms. PROGRESSING; NOT MET    PLAN     Cont with POC per ACL quad tendon protocol  Currently phase I    Shan Dolan, PT

## 2022-12-25 RX ORDER — HYDROCODONE BITARTRATE AND ACETAMINOPHEN 5; 325 MG/1; MG/1
1 TABLET ORAL EVERY 6 HOURS PRN
Qty: 28 TABLET | Refills: 0 | Status: SHIPPED | OUTPATIENT
Start: 2022-12-25 | End: 2023-03-30

## 2022-12-28 ENCOUNTER — CLINICAL SUPPORT (OUTPATIENT)
Dept: REHABILITATION | Facility: HOSPITAL | Age: 37
End: 2022-12-28
Payer: MEDICAID

## 2022-12-28 DIAGNOSIS — M62.81 QUADRICEPS WEAKNESS: ICD-10-CM

## 2022-12-28 DIAGNOSIS — Z74.09 DECREASED FUNCTIONAL MOBILITY AND ENDURANCE: Primary | ICD-10-CM

## 2022-12-28 PROCEDURE — 97110 THERAPEUTIC EXERCISES: CPT | Mod: PN

## 2022-12-28 NOTE — PROGRESS NOTES
[facility]   Physical Therapy Treatment Note     Name: Terri Summers  Clinic Number: [id number]    Therapy Diagnosis:   Encounter Diagnoses   Name Primary?    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  She  was not provided with an home exercise program due to surgery    Response to previous treatment: post-op re-eval  Functional change: post-op re-eval    Pain: 5/10  Location: right knee      OBJECTIVE     See re-assessment on 12/16/22    Date of surgery: 12/15/22 by Dr. Crsytal  Procedure:  Right ACL reconstruction with quadriceps autograft   Right all-inside lateral meniscus repair  Right medial quadriceps tendon reconstruction with semitendinosis allograft     12/22/22:  85 degree knee flexion PROM    Treatment     Terri received the treatments listed below      therapeutic exercises to develop strength, endurance, ROM, flexibility, posture, and core stabilization for 45 minutes including:    Quad set with towel roll under knee with NMES: 5 minutes (no NMES today)  Quad set with towel roll under ankle with NMES: 5 minutes (no NMES today)  Ankle pumps: 2 minutes -  "not today    Hip abduction straight leg raise: 2x10, 1#  Hip extension straight leg raise: 3x10 - not today  Hip adduction straight leg raise: 3x10 - not today  Straight leg raise (with brace locked in extension): 3x10  Heel slides with straps: 12x5" holds    Weight shifts: 2 minutes   Trial in gait with single crutch    Not today:  supervised modalities after being cleared for contradictions: NMES Electrical Stimulation:  Terri received NMES Electrical Stimulation to elicit muscle contraction of the right quad. Pt received stimulation with symmetric current, ramp of 3 seconds with 10 second on time and 5 second off time. Patient tolerated treatment well without any adverse effects.     Patient Education and Home Exercises     Home Exercises Provided and Patient Education Provided     Education provided:   - use of and fitting of hinge brace  - benefits of NMES and TENS - units to purchase  - blood flow restriction therapy    Written Home Exercises Provided: yes. Exercises were reviewed and Terri was able to demonstrate them prior to the end of the session.  Terri demonstrated good  understanding of the education provided. See EMR under Patient Instructions for exercises provided during therapy sessions    ASSESSMENT     Terri arrives 13 days status post ACL repair with quad tendon autograph and MQTFL reconstruction with semitendinosis allograph. Pt doing excellent and good pain control recently. She feels more mobile and has bene using her crutches. Very minimal to no straight leg raise lag present, and ready to increase resistance for strengthening activities to prepare for discharge of crutches.    Terri Is progressing well towards her goals.   Pt prognosis is Good.     Pt will continue to benefit from skilled outpatient physical therapy to address the deficits listed in the problem list box on initial evaluation, provide pt/family education and to maximize pt's level of independence in the home and community " environment.     Pt's spiritual, cultural and educational needs considered and pt agreeable to plan of care and goals.     Anticipated barriers to physical therapy: chronic nature of symptoms    Goals:   Short Term Goals: 6 weeks   1.  Patient will demonstrate right knee ROM at least 5-0-120 degree. PROGRESSING; NOT MET  2.  Patient will demonstrate right quad strength by performing 3x10 of active straight leg raise without lag.  PROGRESSING; NOT MET  3.  Patient will report worst right knee pain < 4/10 to improve ADL performance. PROGRESSING; NOT MET     Long Term Goals: 16 weeks   1.  Patient will demonstrate ability to perform > 12 sit <> stand transfers w/o UE use for improved ability to stand from low surfaces. PROGRESSING; NOT MET  2.  Patient will demonstrate right quad strength via MicroFET of > 27 kg to improve transfers, gait and ADL performance. PROGRESSING; NOT MET  3.  Patient will demonstrate to PT comprehensive understanding of each HEP component without verbal cueing. PROGRESSING; NOT MET  4.  Patient will improve FOTO to < 43% to improve ADL performance. PROGRESSING; NOT MET  5.  Patient will demonstrate ability to negotiate a flight of stairs with reciprocal pattern, no handrail and no compensation or symptoms. PROGRESSING; NOT MET    PLAN     Cont with POC per ACL quad tendon protocol  Currently phase I    Darius Moore, PT

## 2023-01-03 ENCOUNTER — CLINICAL SUPPORT (OUTPATIENT)
Dept: REHABILITATION | Facility: HOSPITAL | Age: 38
End: 2023-01-03
Payer: MEDICAID

## 2023-01-03 DIAGNOSIS — M62.81 QUADRICEPS WEAKNESS: ICD-10-CM

## 2023-01-03 DIAGNOSIS — Z74.09 DECREASED FUNCTIONAL MOBILITY AND ENDURANCE: Primary | ICD-10-CM

## 2023-01-03 PROCEDURE — 97110 THERAPEUTIC EXERCISES: CPT | Mod: PN | Performed by: PHYSICAL THERAPIST

## 2023-01-03 NOTE — PROGRESS NOTES
[facility]   Physical Therapy Treatment Note     Name: Terri Summers  Clinic Number: [id number]    Therapy Diagnosis:   Encounter Diagnoses   Name Primary?    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 She sees Dr. Crystal on Friday. She's going to go into the office for a couple hours today and try to figure out a setup.    She was compliant with her home exercise program.    Response to previous treatment: decreasing pain  Functional change: ambulating with 1 crutch around home    Pain: 4/10  Location: right knee      OBJECTIVE     See re-assessment on 12/16/22    Date of surgery: 12/15/22 by Dr. Crystal  Procedure:  Right ACL reconstruction with quadriceps autograft   Right all-inside lateral meniscus repair  Right medial quadriceps tendon reconstruction with semitendinosis allograft     12/22/22:  85 degree knee flexion PROM    Treatment     Terri received the treatments listed below      therapeutic exercises to develop strength, endurance, ROM, flexibility, posture, and core stabilization for 60 minutes including:    Quad set with small roll under knee with NMES: 4 minutes  "  Quad set with towel roll under ankle with NMES: 5 minutes   Quad set with large roll under knee with NMES: 3 minutes   Ankle pumps: 2 minutes    Hip abduction straight leg raise: 3x10  Hip extension straight leg raise: 3x10  Hip adduction straight leg raise: 3x10  Straight leg raise (with brace locked in extension): 2x10  Heel slides with straps: 12x5" holds    Seated at edge of table knee flexion hold: 2 minutes     Weight shifts: 2 minutes each (forward with right and side to side to right)  Heel raises: 2x10 double leg   Gait training with 1 crutch in left upper extremity - 150 ft     supervised modalities after being cleared for contradictions: NMES Electrical Stimulation:  Terri received NMES Electrical Stimulation to elicit muscle contraction of the right quad. Pt received stimulation with symmetric current, ramp of 3 seconds with 10 second on time and 5 second off time. Patient tolerated treatment well without any adverse effects.         Patient Education and Home Exercises     Home Exercises Provided and Patient Education Provided     Education provided:   - use of and fitting of hinge brace  - benefits of NMES and TENS - units to purchase  - blood flow restriction therapy  - sitting with brace unlocked for a little at home    Written Home Exercises Provided: yes. Exercises were reviewed and Terri was able to demonstrate them prior to the end of the session.  Terri demonstrated good  understanding of the education provided. See EMR under Patient Instructions for exercises provided during therapy sessions    ASSESSMENT     Terri arrives wk 1 status post ACL repair with quad tendon autograph and MQTFL reconstruction with semitendinosis allograph. She arrives with Bilateral axillary crutches following WBAT status. Prior to surgery, she had chronic lateral patellar dislocations. Quad control continues to improve without any lag with straight leg raise today. Poor knee flexion today compared to previous " visits. Ambulating in clinic well with 1 crutch.      Terri Is progressing well towards her goals.   Pt prognosis is Good.     Pt will continue to benefit from skilled outpatient physical therapy to address the deficits listed in the problem list box on initial evaluation, provide pt/family education and to maximize pt's level of independence in the home and community environment.     Pt's spiritual, cultural and educational needs considered and pt agreeable to plan of care and goals.     Anticipated barriers to physical therapy: chronic nature of symptoms    Goals:   Short Term Goals: 6 weeks   1.  Patient will demonstrate right knee ROM at least 5-0-120 degree. PROGRESSING; NOT MET  2.  Patient will demonstrate right quad strength by performing 3x10 of active straight leg raise without lag.  PROGRESSING; NOT MET  3.  Patient will report worst right knee pain < 4/10 to improve ADL performance. PROGRESSING; NOT MET     Long Term Goals: 16 weeks   1.  Patient will demonstrate ability to perform > 12 sit <> stand transfers w/o UE use for improved ability to stand from low surfaces. PROGRESSING; NOT MET  2.  Patient will demonstrate right quad strength via MicroFET of > 27 kg to improve transfers, gait and ADL performance. PROGRESSING; NOT MET  3.  Patient will demonstrate to PT comprehensive understanding of each HEP component without verbal cueing. PROGRESSING; NOT MET  4.  Patient will improve FOTO to < 43% to improve ADL performance. PROGRESSING; NOT MET  5.  Patient will demonstrate ability to negotiate a flight of stairs with reciprocal pattern, no handrail and no compensation or symptoms. PROGRESSING; NOT MET    PLAN     Cont with POC per ACL quad tendon protocol  Currently phase II  Plan BFR cell swelling next week    Shan Dolan, PT

## 2023-01-04 ENCOUNTER — OFFICE VISIT (OUTPATIENT)
Dept: ORTHOPEDICS | Facility: CLINIC | Age: 38
End: 2023-01-04
Payer: MEDICAID

## 2023-01-04 VITALS — HEIGHT: 66 IN | BODY MASS INDEX: 29.23 KG/M2 | WEIGHT: 181.88 LBS

## 2023-01-04 DIAGNOSIS — Z98.890 S/P ACL RECONSTRUCTION: Primary | ICD-10-CM

## 2023-01-04 PROCEDURE — 99999 PR PBB SHADOW E&M-EST. PATIENT-LVL III: ICD-10-PCS | Mod: PBBFAC,,, | Performed by: ORTHOPAEDIC SURGERY

## 2023-01-04 PROCEDURE — 99024 POSTOP FOLLOW-UP VISIT: CPT | Mod: ,,, | Performed by: ORTHOPAEDIC SURGERY

## 2023-01-04 PROCEDURE — 1159F MED LIST DOCD IN RCRD: CPT | Mod: CPTII,,, | Performed by: ORTHOPAEDIC SURGERY

## 2023-01-04 PROCEDURE — 1160F RVW MEDS BY RX/DR IN RCRD: CPT | Mod: CPTII,,, | Performed by: ORTHOPAEDIC SURGERY

## 2023-01-04 PROCEDURE — 1159F PR MEDICATION LIST DOCUMENTED IN MEDICAL RECORD: ICD-10-PCS | Mod: CPTII,,, | Performed by: ORTHOPAEDIC SURGERY

## 2023-01-04 PROCEDURE — 3008F PR BODY MASS INDEX (BMI) DOCUMENTED: ICD-10-PCS | Mod: CPTII,,, | Performed by: ORTHOPAEDIC SURGERY

## 2023-01-04 PROCEDURE — 99024 PR POST-OP FOLLOW-UP VISIT: ICD-10-PCS | Mod: ,,, | Performed by: ORTHOPAEDIC SURGERY

## 2023-01-04 PROCEDURE — 99213 OFFICE O/P EST LOW 20 MIN: CPT | Mod: PBBFAC,PN | Performed by: ORTHOPAEDIC SURGERY

## 2023-01-04 PROCEDURE — 1160F PR REVIEW ALL MEDS BY PRESCRIBER/CLIN PHARMACIST DOCUMENTED: ICD-10-PCS | Mod: CPTII,,, | Performed by: ORTHOPAEDIC SURGERY

## 2023-01-04 PROCEDURE — 99999 PR PBB SHADOW E&M-EST. PATIENT-LVL III: CPT | Mod: PBBFAC,,, | Performed by: ORTHOPAEDIC SURGERY

## 2023-01-04 PROCEDURE — 3008F BODY MASS INDEX DOCD: CPT | Mod: CPTII,,, | Performed by: ORTHOPAEDIC SURGERY

## 2023-01-04 RX ORDER — MELOXICAM 15 MG/1
15 TABLET ORAL
COMMUNITY
Start: 2022-12-23 | End: 2023-05-05

## 2023-01-04 NOTE — PROGRESS NOTES
"Patient ID:   Terri Summers is a 37 y.o. female.    Chief Complaint:   Surgical aftercare status post right ACL reconstruction with quadriceps autograft, MQTFL reconstruction with hamstring allograft    HPI:   The patient is returning today for evaluation of the right knee.  She is about 2 weeks out from her surgery.  Pain level today is 6/10.  She is been in physical therapy.    Medications:    Current Outpatient Medications:     acetaminophen (TYLENOL) 325 MG tablet, Take 325 mg by mouth every 6 (six) hours as needed for Pain., Disp: , Rfl:     ALPRAZolam (XANAX) 0.5 MG tablet, Take 1 tablet (0.5 mg total) by mouth daily as needed for Anxiety., Disp: 30 tablet, Rfl: 5    dextroamphetamine-amphetamine (ADDERALL) 30 mg Tab, Take 1 tablet (30 mg total) by mouth 2 (two) times daily., Disp: 180 tablet, Rfl: 0    dextroamphetamine-amphetamine 30 mg Tab, Take 1 tablet (30 mg total) by mouth 2 (two) times daily., Disp: 60 tablet, Rfl: 0    dextroamphetamine-amphetamine 30 mg Tab, Take 1 tablet (30 mg total) by mouth 2 (two) times daily., Disp: 60 tablet, Rfl: 0    ergocalciferol (ERGOCALCIFEROL) 50,000 unit Cap, Take 50,000 Units by mouth every 7 days., Disp: , Rfl:     HYDROcodone-acetaminophen (NORCO) 5-325 mg per tablet, Take 1 tablet by mouth every 6 (six) hours as needed for Pain., Disp: 28 tablet, Rfl: 0    ibuprofen (ADVIL,MOTRIN) 800 MG tablet, TK 1 T PO BID WF PRF PAIN, Disp: , Rfl: 0    meloxicam (MOBIC) 15 MG tablet, Take 15 mg by mouth., Disp: , Rfl:     Allergies:  Review of patient's allergies indicates:  No Known Allergies    Vitals:  Ht 5' 6" (1.676 m)   Wt 82.5 kg (181 lb 14.1 oz)   BMI 29.36 kg/m²     Physical Examination:  Ortho Exam   Right knee exam:  Mild effusion.   ROM: 0-90  Patellar translation 1 quadrant  Lachmans 0    Assessment:  1. S/P ACL reconstruction      Plan:  The patient will have her sutures removed. Continue PT. May unlock hinged knee brace and WBAT.Follow-up in one month.       No " follow-ups on file.

## 2023-01-09 ENCOUNTER — CLINICAL SUPPORT (OUTPATIENT)
Dept: REHABILITATION | Facility: HOSPITAL | Age: 38
End: 2023-01-09
Payer: MEDICAID

## 2023-01-09 DIAGNOSIS — M62.81 QUADRICEPS WEAKNESS: ICD-10-CM

## 2023-01-09 DIAGNOSIS — Z74.09 DECREASED FUNCTIONAL MOBILITY AND ENDURANCE: Primary | ICD-10-CM

## 2023-01-09 PROCEDURE — 97110 THERAPEUTIC EXERCISES: CPT | Mod: PN | Performed by: PHYSICAL THERAPIST

## 2023-01-09 NOTE — PROGRESS NOTES
[facility]   Physical Therapy Treatment Note     Name: Terri Summers  Clinic Number: [id number]    Therapy Diagnosis:   Encounter Diagnoses   Name Primary?    Decreased functional mobility and endurance Yes    Quadriceps weakness          Physician: Shan Crystal MD    Visit Date: 1/9/2023    Physician Orders: PT Eval and Treat      Medical Diagnosis from Referral:   S83.004D (ICD-10-CM) - Traumatic dislocation of patella, right, subsequent encounter   S83.511A (ICD-10-CM) - Chronic rupture of ACL of right knee      Evaluation Date: 11/22/2022  Plan of Care Expiration: 12/31/22  Progress Note Due: 1/28/22  Visit # / Visits authorized: 2/20 (+6 from 2022)  FOTO: 7/5 (new one given post-op) - Next  PTA Visit: 0/5    Precautions: standard, post-op ACL with quad tendon autograph protocol; NO LOADED FLEXION PAST 90 x 4 months; May unlock hinged knee brace and WBAT.Follow-up in one month.    Time In: 11:06 AM  Time Out: 12:00 PM  Total Billable Time: 54 minutes (TEx4)    SUBJECTIVE     Pt reports: She got her e-stim unit on Friday, but hasn't used it yet. She sees Dr. Crystal on Friday. She's going to go into the office for a couple hours today and try to figure out a setup.    She was compliant with her home exercise program.    Response to previous treatment: decreasing pain  Functional change: ambulating with 1 crutch around home    Pain: 4/10  Location: right knee      OBJECTIVE     See re-assessment on 12/16/22    Date of surgery: 12/15/22 by Dr. Crystal  Procedure:  Right ACL reconstruction with quadriceps autograft   Right all-inside lateral meniscus repair  Right medial quadriceps tendon reconstruction with semitendinosis allograft     1/9/23:  91 degree knee flexion AAROM initially (improved to 113 degree)    Girth measurement: (13 cm up)  Right: 51 cm to 52.5 cm after BFR  Left: 51 cm     Treatment     Terri received the treatments listed below      therapeutic exercises to  "develop strength, endurance, ROM, flexibility, posture, and core stabilization for 54 minutes including:    Blood flow restriction therapy cell swellin% LOP (UOP: 210 mmHg; 80% = 168 mmHg) with quad sets 5 minutes on: 3 minutes rest x 3; 10:3 contract:relax ratio     Straight leg raise: 3x10  Heel slides with straps: 15x5" holds      Single leg stance: 10x10" holds (mild upper extremity use)  Heel raises: 2x10 double leg   Mini squats: 2x10 with light upper extremity use and visual feedback via mirror for weight shifting    Next:  Balance board double leg stance  Step ups: 4" step    Not today:  Hip abduction straight leg raise: 3x10  Hip extension straight leg raise: 3x10      Patient Education and Home Exercises     Home Exercises Provided and Patient Education Provided     Education provided:   - use of and fitting of hinge brace  - benefits of NMES and TENS - units to purchase  - blood flow restriction therapy  - sitting with brace unlocked for a little at home    Written Home Exercises Provided: yes. Exercises were reviewed and Terri was able to demonstrate them prior to the end of the session.  Terri demonstrated good  understanding of the education provided. See EMR under Patient Instructions for exercises provided during therapy sessions    ASSESSMENT     Terri arrives wk 1 status post ACL repair with quad tendon autograph and MQTFL reconstruction with semitendinosis allograph. She arrives with Bilateral axillary crutches following WBAT status. Prior to surgery, she had chronic lateral patellar dislocations. She arrived WBAT with brace unlocked using Bilateral axillary crutches today due to report of increased edema and she's increased weight bearing. Initiated BFR cell swelling protocol today with 1.5 cm hypertrophy following and no symptoms. Left weight shift compensation with mini squats and cues to improve hip hinge. Knee flexion range of motion improved to 113 degree by end of visit.       Terri Is " progressing well towards her goals.   Pt prognosis is Good.     Pt will continue to benefit from skilled outpatient physical therapy to address the deficits listed in the problem list box on initial evaluation, provide pt/family education and to maximize pt's level of independence in the home and community environment.     Pt's spiritual, cultural and educational needs considered and pt agreeable to plan of care and goals.     Anticipated barriers to physical therapy: chronic nature of symptoms    Goals:   Short Term Goals: 6 weeks   1.  Patient will demonstrate right knee ROM at least 5-0-120 degree. PROGRESSING; NOT MET  2.  Patient will demonstrate right quad strength by performing 3x10 of active straight leg raise without lag.  PROGRESSING; NOT MET  3.  Patient will report worst right knee pain < 4/10 to improve ADL performance. PROGRESSING; NOT MET     Long Term Goals: 16 weeks   1.  Patient will demonstrate ability to perform > 12 sit <> stand transfers w/o UE use for improved ability to stand from low surfaces. PROGRESSING; NOT MET  2.  Patient will demonstrate right quad strength via MicroFET of > 27 kg to improve transfers, gait and ADL performance. PROGRESSING; NOT MET  3.  Patient will demonstrate to PT comprehensive understanding of each HEP component without verbal cueing. PROGRESSING; NOT MET  4.  Patient will improve FOTO to < 43% to improve ADL performance. PROGRESSING; NOT MET  5.  Patient will demonstrate ability to negotiate a flight of stairs with reciprocal pattern, no handrail and no compensation or symptoms. PROGRESSING; NOT MET    PLAN     Cont with POC per ACL quad tendon protocol  Currently phase II  BFR cell swelling     Shan Dolan, PT

## 2023-01-11 ENCOUNTER — CLINICAL SUPPORT (OUTPATIENT)
Dept: REHABILITATION | Facility: HOSPITAL | Age: 38
End: 2023-01-11
Payer: MEDICAID

## 2023-01-11 DIAGNOSIS — Z74.09 DECREASED FUNCTIONAL MOBILITY AND ENDURANCE: Primary | ICD-10-CM

## 2023-01-11 DIAGNOSIS — M62.81 QUADRICEPS WEAKNESS: ICD-10-CM

## 2023-01-11 PROCEDURE — 97110 THERAPEUTIC EXERCISES: CPT | Mod: PN,CQ

## 2023-01-11 NOTE — PROGRESS NOTES
[facility]   Physical Therapy Treatment Note     Name: Terri Summers  Clinic Number: [id number]    Therapy Diagnosis:   Encounter Diagnoses   Name Primary?    Decreased functional mobility and endurance Yes    Quadriceps weakness        Physician: Shan Crystal MD    Visit Date: 1/11/2023    Physician Orders: PT Eval and Treat      Medical Diagnosis from Referral:   S83.004D (ICD-10-CM) - Traumatic dislocation of patella, right, subsequent encounter   S83.511A (ICD-10-CM) - Chronic rupture of ACL of right knee      Evaluation Date: 11/22/2022  Plan of Care Expiration: 12/31/22   Progress Note Due: 1/28/22  Visit # / Visits authorized: 3/20 (+6 from 2022),   FOTO: 8/5 (new one given post-op) - NEXT VISIT  PTA Visit: 1/5    Precautions: standard, post-op ACL with quad tendon autograph protocol; NO LOADED FLEXION PAST 90 x 4 months; May unlock hinged knee brace and WBAT.Follow-up in one month.    Time In: 9:00 AM  Time Out: 9:55 PM  Total Billable Time: 55 minutes (TEx4)    SUBJECTIVE     Pt reports: her pain level is about the same.    She was compliant with her home exercise program.    Response to previous treatment: no problems.  Functional change: ambulating with 1 crutch around home    Pain: 4/10  Location: right knee      OBJECTIVE     See re-assessment on 12/16/22    Date of surgery: 12/15/22 by Dr. Crystal  Procedure:  Right ACL reconstruction with quadriceps autograft   Right all-inside lateral meniscus repair  Right medial quadriceps tendon reconstruction with semitendinosis allograft     1/9/23:  91 degree knee flexion AAROM initially (improved to 113 degree)    Girth measurement: (13 cm up)  Right: 51 cm to 52.5 cm after BFR  Left: 51 cm     Treatment     Terri received the treatments listed below      therapeutic exercises to develop strength, endurance, ROM, flexibility, posture, and core stabilization for 54 minutes including:    Blood flow restriction therapy cell  "swellin% LOP (UOP: 210 mmHg; 80% = 168 mmHg) with quad sets 5 minutes on: 3 minutes rest x 3; 10:3 contract:relax ratio     Straight leg raise: 3x10  Heel slides with straps: 15x5" holds    Single leg stance: 10x10" holds (mild upper extremity use)  Heel raises: 2x10 double leg   Mini squats: 2x10 with light upper extremity use and visual feedback via mirror for weight shifting    Next:  Balance board double leg stance  Step ups: 4" step    Not today:  Hip abduction straight leg raise: 3x10  Hip extension straight leg raise: 3x10      Patient Education and Home Exercises     Home Exercises Provided and Patient Education Provided     Education provided:   - use of and fitting of hinge brace  - benefits of NMES and TENS - units to purchase  - blood flow restriction therapy  - sitting with brace unlocked for a little at home    Written Home Exercises Provided: yes. Exercises were reviewed and Terri was able to demonstrate them prior to the end of the session.  Terri demonstrated good  understanding of the education provided. See EMR under Patient Instructions for exercises provided during therapy sessions    ASSESSMENT     Terri arrives wk 1 status post ACL repair with quad tendon autograph and MQTFL reconstruction with semitendinosis allograph. She arrives with Bilateral  axillary crutches following WBAT status. Prior to surgery, she had chronic lateral patellar dislocations. Left weight shift compensation with mini squats and cues to improve hip hinge. Patient completed her therapy with no increase in symptoms prior to leaving the clinic.    Terri Is progressing well towards her goals.   Pt prognosis is Good.     Pt will continue to benefit from skilled outpatient physical therapy to address the deficits listed in the problem list box on initial evaluation, provide pt/family education and to maximize pt's level of independence in the home and community environment.     Pt's spiritual, cultural and educational " needs considered and pt agreeable to plan of care and goals.     Anticipated barriers to physical therapy: chronic nature of symptoms    Goals:   Short Term Goals: 6 weeks   1.  Patient will demonstrate right knee ROM at least 5-0-120 degree. PROGRESSING; NOT MET  2.  Patient will demonstrate right quad strength by performing 3x10 of active straight leg raise without lag.  PROGRESSING; NOT MET  3.  Patient will report worst right knee pain < 4/10 to improve ADL performance. PROGRESSING; NOT MET     Long Term Goals: 16 weeks   1.  Patient will demonstrate ability to perform > 12 sit <> stand transfers w/o UE use for improved ability to stand from low surfaces. PROGRESSING; NOT MET  2.  Patient will demonstrate right quad strength via MicroFET of > 27 kg to improve transfers, gait and ADL performance. PROGRESSING; NOT MET  3.  Patient will demonstrate to PT comprehensive understanding of each HEP component without verbal cueing. PROGRESSING; NOT MET  4.  Patient will improve FOTO to < 43% to improve ADL performance. PROGRESSING; NOT MET  5.  Patient will demonstrate ability to negotiate a flight of stairs with reciprocal pattern, no handrail and no compensation or symptoms. PROGRESSING; NOT MET    PLAN     Cont with POC per ACL quad tendon protocol  Currently phase II  BFR cell swelling     Jan Restrepo, PTA

## 2023-01-18 ENCOUNTER — CLINICAL SUPPORT (OUTPATIENT)
Dept: REHABILITATION | Facility: HOSPITAL | Age: 38
End: 2023-01-18
Payer: MEDICAID

## 2023-01-18 DIAGNOSIS — M62.81 QUADRICEPS WEAKNESS: ICD-10-CM

## 2023-01-18 DIAGNOSIS — Z74.09 DECREASED FUNCTIONAL MOBILITY AND ENDURANCE: Primary | ICD-10-CM

## 2023-01-18 PROCEDURE — 97110 THERAPEUTIC EXERCISES: CPT | Mod: PN,CQ

## 2023-01-18 NOTE — PROGRESS NOTES
[facility]   Physical Therapy Treatment Note     Name: Terri Summers  Clinic Number: [id number]    Therapy Diagnosis:   Encounter Diagnoses   Name Primary?    Decreased functional mobility and endurance Yes    Quadriceps weakness          Physician: Shan Crystal MD    Visit Date: 1/18/2023    Physician Orders: PT Eval and Treat      Medical Diagnosis from Referral:   S83.004D (ICD-10-CM) - Traumatic dislocation of patella, right, subsequent encounter   S83.511A (ICD-10-CM) - Chronic rupture of ACL of right knee      Evaluation Date: 11/22/2022  Plan of Care Expiration: 12/31/22   Progress Note Due: 1/28/22  Visit # / Visits authorized: 4/20 (+6 from 2022),   FOTO: 8/5 (new one given post-op) - NEXT VISIT  PTA Visit: 2/5    Precautions: standard, post-op ACL with quad tendon autograph protocol; NO LOADED FLEXION PAST 90 x 4 months; May unlock hinged knee brace and WBAT.Follow-up in one month.    Time In: 9:10 AM  Time Out: 9:55 PM  Total Billable Time: 45 minutes (TEx3)    SUBJECTIVE     Pt reports: Patient reports increase in pain level patient denies any injury or breaking precautions stating she thinks it is from having knee bent while at work.     She was compliant with her home exercise program.    Response to previous treatment: no problems.  Functional change: ambulating with 1 crutch around home    Pain: 4/10  Location: right knee      OBJECTIVE     See re-assessment on 12/16/22    Date of surgery: 12/15/22 by Dr. Crystal  Procedure:  Right ACL reconstruction with quadriceps autograft   Right all-inside lateral meniscus repair  Right medial quadriceps tendon reconstruction with semitendinosis allograft     1/9/23:  91 degree knee flexion AAROM initially (improved to 113 degree)    Girth measurement: (13 cm up)  Right: 51 cm to 52.5 cm after BFR  Left: 51 cm     Treatment     Terri received the treatments listed below      therapeutic exercises to develop strength, endurance,  "ROM, flexibility, posture, and core stabilization for  minutes 45 including:        Straight leg raise flexion : 3x10  SLR abduction  3x10   Hip extension straight leg raise: 3x10 standing   Heel slides with straps: 20x5" holds    Single leg stance: 10x10" holds (mild upper extremity use)  Heel raises: 3x 10 double leg   feedback via mirror for weight shifting  Fitter First Balance (S/S): Static balance 1' x3 ,  Fitter First Balance (S/S):Weight shifting S/S x30     Next:  Step ups: 4" step    Not today:  Blood flow restriction therapy cell swellin% LOP (UOP: 210 mmHg; 80% = 168 mmHg) with quad sets 5 minutes on: 3 minutes rest x 3; 10:3 contract:relax ratio         Mini squats: 2x10 with light upper extremity use and visual due to pain.       Patient Education and Home Exercises     Home Exercises Provided and Patient Education Provided     Education provided:   - use of and fitting of hinge brace  - benefits of NMES and TENS - units to purchase  - blood flow restriction therapy  - sitting with brace unlocked for a little at home    Written Home Exercises Provided: yes. Exercises were reviewed and Terri was able to demonstrate them prior to the end of the session.  Terri demonstrated good  understanding of the education provided. See EMR under Patient Instructions for exercises provided during therapy sessions    ASSESSMENT     Terri arrives wk 1 status post ACL repair with quad tendon autograph and MQTFL reconstruction with semitendinosis allograph. She arrives with unilateral  axillary crutch following WBAT status. Prior to surgery, she had chronic lateral patellar dislocations. Mini squats deferred and step ups not initiated today due to pain in knee with mini squats. Initiated fitter board for lateral weight shifting and balance. Patient reports mild discomfort with weight shifting to RLE allowing LLE to flex to accommodate weight shifting off of it stating. Patient declined modalities today stating she " can Ice at work. Unable to perform BFR this visit as device needs charged (attempted to use after 10 minute charge time, not working.     Terri Is progressing well towards her goals.   Pt prognosis is Good.     Pt will continue to benefit from skilled outpatient physical therapy to address the deficits listed in the problem list box on initial evaluation, provide pt/family education and to maximize pt's level of independence in the home and community environment.     Pt's spiritual, cultural and educational needs considered and pt agreeable to plan of care and goals.     Anticipated barriers to physical therapy: chronic nature of symptoms    Goals:   Short Term Goals: 6 weeks   1.  Patient will demonstrate right knee ROM at least 5-0-120 degree. PROGRESSING; NOT MET  2.  Patient will demonstrate right quad strength by performing 3x10 of active straight leg raise without lag.  PROGRESSING; NOT MET  3.  Patient will report worst right knee pain < 4/10 to improve ADL performance. PROGRESSING; NOT MET     Long Term Goals: 16 weeks   1.  Patient will demonstrate ability to perform > 12 sit <> stand transfers w/o UE use for improved ability to stand from low surfaces. PROGRESSING; NOT MET  2.  Patient will demonstrate right quad strength via MicroFET of > 27 kg to improve transfers, gait and ADL performance. PROGRESSING; NOT MET  3.  Patient will demonstrate to PT comprehensive understanding of each HEP component without verbal cueing. PROGRESSING; NOT MET  4.  Patient will improve FOTO to < 43% to improve ADL performance. PROGRESSING; NOT MET  5.  Patient will demonstrate ability to negotiate a flight of stairs with reciprocal pattern, no handrail and no compensation or symptoms. PROGRESSING; NOT MET    PLAN     Cont with POC per ACL quad tendon protocol  Currently phase II  BFR cell swelling     Derrell Hilario, PTA

## 2023-01-20 ENCOUNTER — CLINICAL SUPPORT (OUTPATIENT)
Dept: REHABILITATION | Facility: HOSPITAL | Age: 38
End: 2023-01-20
Payer: MEDICAID

## 2023-01-20 DIAGNOSIS — Z74.09 DECREASED FUNCTIONAL MOBILITY AND ENDURANCE: Primary | ICD-10-CM

## 2023-01-20 DIAGNOSIS — M62.81 QUADRICEPS WEAKNESS: ICD-10-CM

## 2023-01-20 PROCEDURE — 97110 THERAPEUTIC EXERCISES: CPT | Mod: PN,CQ

## 2023-01-20 NOTE — PROGRESS NOTES
[facility]   Physical Therapy Treatment Note     Name: Terri Summers  Clinic Number: [id number]    Therapy Diagnosis:   Encounter Diagnoses   Name Primary?    Decreased functional mobility and endurance Yes    Quadriceps weakness          Physician: Shan Crystal MD    Visit Date: 1/20/2023    Physician Orders: PT Eval and Treat      Medical Diagnosis from Referral:   S83.004D (ICD-10-CM) - Traumatic dislocation of patella, right, subsequent encounter   S83.511A (ICD-10-CM) - Chronic rupture of ACL of right knee      Evaluation Date: 11/22/2022  Plan of Care Expiration: 12/31/22   Progress Note Due: 1/28/22  Visit # / Visits authorized: 5/20 (+6 from 2022),   FOTO: 9/5 (new one given post-op) - NEXT VISIT  PTA Visit: 3/5    Precautions: standard, post-op ACL with quad tendon autograph protocol; NO LOADED FLEXION PAST 90 x 4 months; May unlock hinged knee brace and WBAT.Follow-up in one month.    Time In: 9:02 AM  Time Out: 9:57 PM  Total Billable Time: 55 minutes (TEx4)    SUBJECTIVE     Pt reports: Patient reports 4/10 pain stating recent flair up started to calm down last night. Patient still thinks sitting at work with knee flexed is aggravating factor.     She was compliant with her home exercise program.    Response to previous treatment: no problems.  Functional change: ambulating with 1 crutch around home    Pain: 4/10  Location: right knee      OBJECTIVE     See re-assessment on 12/16/22    Date of surgery: 12/15/22 by Dr. Crystal  Procedure:  Right ACL reconstruction with quadriceps autograft   Right all-inside lateral meniscus repair  Right medial quadriceps tendon reconstruction with semitendinosis allograft     1/9/23:  91 degree knee flexion AAROM initially (improved to 113 degree)    Girth measurement: (13 cm up)  Right: 51 cm to 52.5 cm after BFR  Left: 51 cm     Treatment     Terri received the treatments listed below      therapeutic exercises to develop strength,  "endurance, ROM, flexibility, posture, and core stabilization for  minutes 55 including:      Blood flow restriction therapy cell swellin% LOP (UOP: 210 mmHg; 80% = 168 mmHg) with quad sets 5 minutes on: 3 minutes rest x 3; 10:3 contract:relax ratio     Straight leg raise flexion : 3x10  SLR abduction  3x10   Heel slides with straps: 20x5" holds    Single leg stance: 10x10" holds (mild upper extremity use)  Heel raises: 3x 10 double leg   Fitter First Balance (S/S): Static balance 1' x3 ,  Fitter First Balance (S/S):Weight shifting S/S x40   Mini squats x10 VC and demo for hip hinge.   Next:  Step ups: 4" step        Not today:    Mini squats: 2x10 with light upper extremity use and visual due to pain.   Hip extension straight leg raise: 3x10 standing       Patient Education and Home Exercises     Home Exercises Provided and Patient Education Provided     Education provided:   - use of and fitting of hinge brace  - benefits of NMES and TENS - units to purchase  - blood flow restriction therapy  - sitting with brace unlocked for a little at home    Written Home Exercises Provided: yes. Exercises were reviewed and Terri was able to demonstrate them prior to the end of the session.  Terri demonstrated good  understanding of the education provided. See EMR under Patient Instructions for exercises provided during therapy sessions    ASSESSMENT     Terri arrives status post ACL repair with quad tendon autograph and MQTFL reconstruction with semitendinosis allograph. She arrives with unilateral  axillary crutch following WBAT status. Resumed BFR today for quad strengthening. Patient also able to resume mini squats due to reduced pain levels. Terri Is progressing well towards her goals.   Pt prognosis is Good.     Pt will continue to benefit from skilled outpatient physical therapy to address the deficits listed in the problem list box on initial evaluation, provide pt/family education and to maximize pt's level of " independence in the home and community environment.     Pt's spiritual, cultural and educational needs considered and pt agreeable to plan of care and goals.     Anticipated barriers to physical therapy: chronic nature of symptoms    Goals:   Short Term Goals: 6 weeks   1.  Patient will demonstrate right knee ROM at least 5-0-120 degree. PROGRESSING; NOT MET  2.  Patient will demonstrate right quad strength by performing 3x10 of active straight leg raise without lag.  PROGRESSING; NOT MET  3.  Patient will report worst right knee pain < 4/10 to improve ADL performance. PROGRESSING; NOT MET     Long Term Goals: 16 weeks   1.  Patient will demonstrate ability to perform > 12 sit <> stand transfers w/o UE use for improved ability to stand from low surfaces. PROGRESSING; NOT MET  2.  Patient will demonstrate right quad strength via MicroFET of > 27 kg to improve transfers, gait and ADL performance. PROGRESSING; NOT MET  3.  Patient will demonstrate to PT comprehensive understanding of each HEP component without verbal cueing. PROGRESSING; NOT MET  4.  Patient will improve FOTO to < 43% to improve ADL performance. PROGRESSING; NOT MET  5.  Patient will demonstrate ability to negotiate a flight of stairs with reciprocal pattern, no handrail and no compensation or symptoms. PROGRESSING; NOT MET    PLAN     Cont with POC per ACL quad tendon protocol  Currently phase II  BFR cell swelling     Derrell Hilario, PTA

## 2023-01-23 ENCOUNTER — CLINICAL SUPPORT (OUTPATIENT)
Dept: REHABILITATION | Facility: HOSPITAL | Age: 38
End: 2023-01-23
Payer: MEDICAID

## 2023-01-23 ENCOUNTER — PATIENT MESSAGE (OUTPATIENT)
Dept: REHABILITATION | Facility: HOSPITAL | Age: 38
End: 2023-01-23

## 2023-01-23 DIAGNOSIS — Z74.09 DECREASED FUNCTIONAL MOBILITY AND ENDURANCE: Primary | ICD-10-CM

## 2023-01-23 DIAGNOSIS — M62.81 QUADRICEPS WEAKNESS: ICD-10-CM

## 2023-01-23 PROCEDURE — 97110 THERAPEUTIC EXERCISES: CPT | Mod: PN | Performed by: PHYSICAL THERAPIST

## 2023-01-23 NOTE — PROGRESS NOTES
"[facility]   Physical Therapy Treatment Note     Name: Terri Summers  Clinic Number: [id number]    Therapy Diagnosis:   Encounter Diagnoses   Name Primary?    Decreased functional mobility and endurance Yes    Quadriceps weakness        Physician: Shan Crystal MD    Visit Date: 1/23/2023    Physician Orders: PT Eval and Treat      Medical Diagnosis from Referral:   S83.004D (ICD-10-CM) - Traumatic dislocation of patella, right, subsequent encounter   S83.511A (ICD-10-CM) - Chronic rupture of ACL of right knee      Evaluation Date: 11/22/2022  Plan of Care Expiration: 12/31/22   Progress Note Due: 1/28/22  Visit # / Visits authorized: 6/20 (+6 from 2022),   FOTO: 10/5 (new one given post-op) - NEXT VISIT  PTA Visit: 0/5    Precautions: standard, post-op ACL with quad tendon autograph protocol; NO LOADED FLEXION PAST 90 x 4 months; May unlock hinged knee brace and WBAT.Follow-up in one month.    Time In: 11:02 AM  Time Out: 12:00 PM  Total Billable Time: 56 minutes (TEx4)    SUBJECTIVE     Pt reports: She was having brief sharps pains and "twitching" over the weekend. She denies any known cause. She returns to ortho on the first week of feb      She was compliant with her home exercise program.    Response to previous treatment: no problems.  Functional change: ambulating with 1 crutch around home    Pain: 5/10  Location: right knee      OBJECTIVE     See re-assessment on 12/16/22    Date of surgery: 12/15/22 by Dr. Crystal  Procedure:  Right ACL reconstruction with quadriceps autograft   Right all-inside lateral meniscus repair  Right medial quadriceps tendon reconstruction with semitendinosis allograft       1/23/23:  Right quad: 11.9 kg  Right knee AROM: 120 degree     Treatment     Terri received the treatments listed below      therapeutic exercises to develop strength, endurance, ROM, flexibility, posture, and core stabilization for  minutes 56 including:    Straight leg raise " "flexion : 3x10  SLR abduction  3x10   Heel slides with straps: 20x5" holds    Single leg stance: 10x10" holds (mild upper extremity use)  Heel raises: 3x 10 double leg   Fitter First Balance (S/S): Static balance 1' x2 each direction   BOSU stance (round side down): 1 min  Mini squats 2x10 VC and demo for hip hinge.     Step ups: 4" step: 2x10 (right ascend, left descend)  Step to tape with right: 20x (working on not circumducting)  4 hurdles: red 2 laps step to with right; 2 laps reciprocal  Gait training: with brace unlocked, no device. Working on active    Not today:    Blood flow restriction therapy cell swellin% LOP (UOP: 210 mmHg; 80% = 168 mmHg) with quad sets 5 minutes on: 3 minutes rest x 3; 10:3 contract:relax ratio     Patient Education and Home Exercises     Home Exercises Provided and Patient Education Provided     Education provided:   - use of and fitting of hinge brace  - benefits of NMES and TENS - units to purchase  - blood flow restriction therapy  - sitting with brace unlocked for a little at home    Written Home Exercises Provided: yes. Exercises were reviewed and Terri was able to demonstrate them prior to the end of the session.  Terri demonstrated good  understanding of the education provided. See EMR under Patient Instructions for exercises provided during therapy sessions    ASSESSMENT     Terri arrives status post ACL repair with quad tendon autograph and MQTFL reconstruction with semitendinosis allograph. She arrives with unilateral  axillary crutch following WBAT status with circumducted gait and decreased knee flexion. No lag with straight leg raise.     Pt prognosis is Good.     Pt will continue to benefit from skilled outpatient physical therapy to address the deficits listed in the problem list box on initial evaluation, provide pt/family education and to maximize pt's level of independence in the home and community environment.     Pt's spiritual, cultural and educational needs " considered and pt agreeable to plan of care and goals.     Anticipated barriers to physical therapy: chronic nature of symptoms    Goals:   Short Term Goals: 6 weeks   1.  Patient will demonstrate right knee ROM at least 5-0-120 degree. PROGRESSING; NOT MET  2.  Patient will demonstrate right quad strength by performing 3x10 of active straight leg raise without lag.  MET  3.  Patient will report worst right knee pain < 4/10 to improve ADL performance. PROGRESSING; NOT MET     Long Term Goals: 16 weeks   1.  Patient will demonstrate ability to perform > 12 sit <> stand transfers w/o UE use for improved ability to stand from low surfaces. PROGRESSING; NOT MET  2.  Patient will demonstrate right quad strength via MicroFET of > 27 kg to improve transfers, gait and ADL performance. PROGRESSING; NOT MET  3.  Patient will demonstrate to PT comprehensive understanding of each HEP component without verbal cueing. PROGRESSING; NOT MET  4.  Patient will improve FOTO to < 43% to improve ADL performance. PROGRESSING; NOT MET  5.  Patient will demonstrate ability to negotiate a flight of stairs with reciprocal pattern, no handrail and no compensation or symptoms. PROGRESSING; NOT MET    PLAN     Cont with POC per ACL quad tendon protocol  Currently phase III  BFR cell swelling     Shan Dolan, PT

## 2023-01-24 ENCOUNTER — DOCUMENTATION ONLY (OUTPATIENT)
Dept: REHABILITATION | Facility: HOSPITAL | Age: 38
End: 2023-01-24
Payer: MEDICAID

## 2023-01-24 NOTE — PROGRESS NOTES
PT met face to face with Derrell Hilario PTA to discuss pt's treatment plan and progress towards established goals. Pt will be seen by a physical therapist minimally every 6th visit or every 30 days.    Neil. Work on quad strength and cautious knee flexion. Work on balance.      Derrell Hilario PTA

## 2023-01-27 ENCOUNTER — CLINICAL SUPPORT (OUTPATIENT)
Dept: REHABILITATION | Facility: HOSPITAL | Age: 38
End: 2023-01-27
Payer: MEDICAID

## 2023-01-27 DIAGNOSIS — Z74.09 DECREASED FUNCTIONAL MOBILITY AND ENDURANCE: Primary | ICD-10-CM

## 2023-01-27 DIAGNOSIS — M62.81 QUADRICEPS WEAKNESS: ICD-10-CM

## 2023-01-27 PROCEDURE — 97110 THERAPEUTIC EXERCISES: CPT | Mod: PN,CQ

## 2023-01-27 NOTE — PROGRESS NOTES
"[facility]   Physical Therapy Treatment Note     Name: Terri Summers  Clinic Number: [id number]    Therapy Diagnosis:   Encounter Diagnoses   Name Primary?    Decreased functional mobility and endurance Yes    Quadriceps weakness        Physician: Shan Crystal MD    Visit Date: 1/27/2023    Physician Orders: PT Eval and Treat      Medical Diagnosis from Referral:   S83.004D (ICD-10-CM) - Traumatic dislocation of patella, right, subsequent encounter   S83.511A (ICD-10-CM) - Chronic rupture of ACL of right knee      Evaluation Date: 11/22/2022  Plan of Care Expiration: 12/31/22   Progress Note Due: 1/28/22  Visit # / Visits authorized: 7/20 (+6 from 2022),   FOTO: 1/27/2023  PTA Visit: 1/5    Precautions: standard, post-op ACL with quad tendon autograph protocol; NO LOADED FLEXION PAST 90 x 4 months; May unlock hinged knee brace and WBAT.Follow-up in one month.    Time In: 12:02 AM  Time Out: 12:45 PM  Total Billable Time: 45 minutes (TEx3)    SUBJECTIVE     Pt reports: Patient reports 2/10 pain today stating she fels great. Patient states she has left leg brace at home today.       She was compliant with her home exercise program.    Response to previous treatment: no problems.  Functional change: ambulating with 1 crutch around home    Pain: 5/10  Location: right knee      OBJECTIVE     See re-assessment on 12/16/22    Date of surgery: 12/15/22 by Dr. Crystal  Procedure:  Right ACL reconstruction with quadriceps autograft   Right all-inside lateral meniscus repair  Right medial quadriceps tendon reconstruction with semitendinosis allograft       1/23/23:  Right quad: 11.9 kg  Right knee AROM: 120 degree     Treatment     Terri received the treatments listed below      therapeutic exercises to develop strength, endurance, ROM, flexibility, posture, and core stabilization for  minutes 56 including:    Quad Sets: 20x5"   SLR flexion : 3x10  SLR abduction  3x10   SLR extension 4x10 " "  SLR ADD 4x10   Bridges 4x10   Heel slides with straps: 20x5" holds  Mini squats 4x10 VC and demo for hip hinge.   Heel raises: 4x 10 double leg   Toe Raise 4x10 Double leg    Step over  yellow kristofer x40     Next visit:   Single leg stance: 10x10" holds (mild upper extremity use)  Fitter First Balance (S/S): Static balance 1' x2 each direction   BOSU stance (round side down): 1 min      Step ups: 4" step: 2x10 (right ascend, left descend)  Step to tape with right: 20x (working on not circumducting)  4 hurdles: red 2 laps step to with right; 2 laps reciprocal    Gait training: with brace unlocked, no device. Working on active    Not today:    Blood flow restriction therapy cell swellin% LOP (UOP: 210 mmHg; 80% = 168 mmHg) with quad sets 5 minutes on: 3 minutes rest x 3; 10:3 contract:relax ratio     Patient Education and Home Exercises     Home Exercises Provided and Patient Education Provided     Education provided:   - use of and fitting of hinge brace  - benefits of NMES and TENS - units to purchase  - blood flow restriction therapy  - sitting with brace unlocked for a little at home    Written Home Exercises Provided: yes. Exercises were reviewed and Terri was able to demonstrate them prior to the end of the session.  Terri demonstrated good  understanding of the education provided. See EMR under Patient Instructions for exercises provided during therapy sessions    ASSESSMENT     Terri arrives status post ACL repair with quad tendon autograph and MQTFL reconstruction with semitendinosis allograph. She arrives with unilateral  axillary crutch following WBAT status with circumducted gait and decreased knee flexion. Discussed with supervising PT Derik Dolan patient arrives with out brace this visit. According to pt patient permitted to perform standing therex with BLE planted (no single leg), and to initiated LAQ 90- 30 degrees for initial AROM against gravity.     Pt prognosis is Good.     Pt will continue to " benefit from skilled outpatient physical therapy to address the deficits listed in the problem list box on initial evaluation, provide pt/family education and to maximize pt's level of independence in the home and community environment.     Pt's spiritual, cultural and educational needs considered and pt agreeable to plan of care and goals.     Anticipated barriers to physical therapy: chronic nature of symptoms    Goals:   Short Term Goals: 6 weeks   1.  Patient will demonstrate right knee ROM at least 5-0-120 degree. PROGRESSING; NOT MET  2.  Patient will demonstrate right quad strength by performing 3x10 of active straight leg raise without lag.  MET  3.  Patient will report worst right knee pain < 4/10 to improve ADL performance. PROGRESSING; NOT MET     Long Term Goals: 16 weeks   1.  Patient will demonstrate ability to perform > 12 sit <> stand transfers w/o UE use for improved ability to stand from low surfaces. PROGRESSING; NOT MET  2.  Patient will demonstrate right quad strength via MicroFET of > 27 kg to improve transfers, gait and ADL performance. PROGRESSING; NOT MET  3.  Patient will demonstrate to PT comprehensive understanding of each HEP component without verbal cueing. PROGRESSING; NOT MET  4.  Patient will improve FOTO to < 43% to improve ADL performance. PROGRESSING; NOT MET  5.  Patient will demonstrate ability to negotiate a flight of stairs with reciprocal pattern, no handrail and no compensation or symptoms. PROGRESSING; NOT MET    PLAN     Cont with POC per ACL quad tendon protocol  Currently phase III  BFR cell swelling     Derrell Hilario, PTA

## 2023-01-30 ENCOUNTER — CLINICAL SUPPORT (OUTPATIENT)
Dept: REHABILITATION | Facility: HOSPITAL | Age: 38
End: 2023-01-30
Payer: MEDICAID

## 2023-01-30 DIAGNOSIS — Z74.09 DECREASED FUNCTIONAL MOBILITY AND ENDURANCE: Primary | ICD-10-CM

## 2023-01-30 DIAGNOSIS — M62.81 QUADRICEPS WEAKNESS: ICD-10-CM

## 2023-01-30 PROCEDURE — 97110 THERAPEUTIC EXERCISES: CPT | Mod: PN,CQ

## 2023-01-30 NOTE — PROGRESS NOTES
"[facility]   Physical Therapy Treatment Note     Name: Terri Summers  Clinic Number: [id number]    Therapy Diagnosis:   Encounter Diagnoses   Name Primary?    Decreased functional mobility and endurance Yes    Quadriceps weakness        Physician: Shan Crystal MD    Visit Date: 1/30/2023    Physician Orders: PT Eval and Treat      Medical Diagnosis from Referral:   S83.004D (ICD-10-CM) - Traumatic dislocation of patella, right, subsequent encounter   S83.511A (ICD-10-CM) - Chronic rupture of ACL of right knee      Evaluation Date: 11/22/2022  Plan of Care Expiration: 12/31/22   Progress Note Due: 1/28/22  Visit # / Visits authorized: 8/20 (+6 from 2022),   FOTO: 1/27/2023  PTA Visit: 2/5    Precautions: standard, post-op ACL with quad tendon autograph protocol; NO LOADED FLEXION PAST 90 x 4 months; May unlock hinged knee brace and WBAT.Follow-up in one month.    Time In: 9:00 AM  Time Out: 9:55 AM  Total Billable Time: 53 minutes (TE x4 )    SUBJECTIVE     Pt reports: Patient reports 3/10 Pain this visit. Patient relates able to complete some house chores increasing pain mildly.       She was compliant with her home exercise program.    Response to previous treatment: no problems.  Functional change: ambulating with 1 crutch around home    Pain: 5/10  Location: right knee      OBJECTIVE     See re-assessment on 12/16/22    Date of surgery: 12/15/22 by Dr. Crystal  Procedure:  Right ACL reconstruction with quadriceps autograft   Right all-inside lateral meniscus repair  Right medial quadriceps tendon reconstruction with semitendinosis allograft       1/23/23:  Right quad: 11.9 kg  Right knee AROM: 120 degree     Treatment     Terri received the treatments listed below      therapeutic exercises to develop strength, endurance, ROM, flexibility, posture, and core stabilization for  minutes 30 including:    Quad Sets: 20x5"   SLR flexion : 3x10  SLR abduction  3x10   SLR extension 4x10 " "  SLR ADD 4x10   Bridges 4x10   Heel slides with straps: 20x5" holds  Mini squats 4x10 VC and demo for hip hinge.   Heel raises: 4x 10 double leg   Toe Raise 4x10 Double leg    Terri participated in neuromuscular re-education activities to improve: Balance and Proprioception for 10 minutes. The following activities were included:     Single leg stance: 10x10" holds (mild upper extremity use)  Fitter First Balance (S/S): Static balance 1' x2 each direction   BOSU stance (round side down): 2x1  min    Terri participated in dynamic functional therapeutic activities to improve functional performance for 15  minutes, including:     Step ups: 4" step: 2x10   Step tap 8" alternating  20x (working on not circumducting)  6 hurdles: red 2 laps step to with right; 2 laps reciprocal      Not today:    Blood flow restriction therapy cell swellin% LOP (UOP: 210 mmHg; 80% = 168 mmHg) with quad sets 5 minutes on: 3 minutes rest x 3; 10:3 contract:relax ratio     Patient Education and Home Exercises     Home Exercises Provided and Patient Education Provided     Education provided:   - use of and fitting of hinge brace  - benefits of NMES and TENS - units to purchase  - blood flow restriction therapy  - sitting with brace unlocked for a little at home    Written Home Exercises Provided: yes. Exercises were reviewed and Terri was able to demonstrate them prior to the end of the session.  Terri demonstrated good  understanding of the education provided. See EMR under Patient Instructions for exercises provided during therapy sessions    ASSESSMENT     Terri arrives status post ACL repair with quad tendon autograph and MQTFL reconstruction with semitendinosis allograph. She arrives with unilateral  axillary crutch following WBAT status with circumducted gait and decreased knee flexion. Resumed/initiated standing activities per direction of supervising PT [place] to progress patient CKC strength and proprioception. Patient tolerates tx " well with out c/o pain.     Pt prognosis is Good.     Pt will continue to benefit from skilled outpatient physical therapy to address the deficits listed in the problem list box on initial evaluation, provide pt/family education and to maximize pt's level of independence in the home and community environment.     Pt's spiritual, cultural and educational needs considered and pt agreeable to plan of care and goals.     Anticipated barriers to physical therapy: chronic nature of symptoms    Goals:   Short Term Goals: 6 weeks   1.  Patient will demonstrate right knee ROM at least 5-0-120 degree. PROGRESSING; NOT MET  2.  Patient will demonstrate right quad strength by performing 3x10 of active straight leg raise without lag.  MET  3.  Patient will report worst right knee pain < 4/10 to improve ADL performance. PROGRESSING; NOT MET     Long Term Goals: 16 weeks   1.  Patient will demonstrate ability to perform > 12 sit <> stand transfers w/o UE use for improved ability to stand from low surfaces. PROGRESSING; NOT MET  2.  Patient will demonstrate right quad strength via MicroFET of > 27 kg to improve transfers, gait and ADL performance. PROGRESSING; NOT MET  3.  Patient will demonstrate to PT comprehensive understanding of each HEP component without verbal cueing. PROGRESSING; NOT MET  4.  Patient will improve FOTO to < 43% to improve ADL performance. PROGRESSING; NOT MET  5.  Patient will demonstrate ability to negotiate a flight of stairs with reciprocal pattern, no handrail and no compensation or symptoms. PROGRESSING; NOT MET    PLAN     Cont with POC per ACL quad tendon protocol  Currently phase III  BFR cell swelling     Derrell Hilario, PTA

## 2023-02-03 ENCOUNTER — OFFICE VISIT (OUTPATIENT)
Dept: ORTHOPEDICS | Facility: CLINIC | Age: 38
End: 2023-02-03
Payer: MEDICAID

## 2023-02-03 VITALS
HEIGHT: 66 IN | BODY MASS INDEX: 29.23 KG/M2 | DIASTOLIC BLOOD PRESSURE: 94 MMHG | WEIGHT: 181.88 LBS | SYSTOLIC BLOOD PRESSURE: 153 MMHG | HEART RATE: 93 BPM

## 2023-02-03 DIAGNOSIS — Z98.890 S/P ACL RECONSTRUCTION: Primary | ICD-10-CM

## 2023-02-03 DIAGNOSIS — S83.004D: ICD-10-CM

## 2023-02-03 PROCEDURE — 3008F BODY MASS INDEX DOCD: CPT | Mod: CPTII,,, | Performed by: ORTHOPAEDIC SURGERY

## 2023-02-03 PROCEDURE — 1159F MED LIST DOCD IN RCRD: CPT | Mod: CPTII,,, | Performed by: ORTHOPAEDIC SURGERY

## 2023-02-03 PROCEDURE — 3080F PR MOST RECENT DIASTOLIC BLOOD PRESSURE >= 90 MM HG: ICD-10-PCS | Mod: CPTII,,, | Performed by: ORTHOPAEDIC SURGERY

## 2023-02-03 PROCEDURE — 3077F SYST BP >= 140 MM HG: CPT | Mod: CPTII,,, | Performed by: ORTHOPAEDIC SURGERY

## 2023-02-03 PROCEDURE — 3080F DIAST BP >= 90 MM HG: CPT | Mod: CPTII,,, | Performed by: ORTHOPAEDIC SURGERY

## 2023-02-03 PROCEDURE — 3077F PR MOST RECENT SYSTOLIC BLOOD PRESSURE >= 140 MM HG: ICD-10-PCS | Mod: CPTII,,, | Performed by: ORTHOPAEDIC SURGERY

## 2023-02-03 PROCEDURE — 1160F PR REVIEW ALL MEDS BY PRESCRIBER/CLIN PHARMACIST DOCUMENTED: ICD-10-PCS | Mod: CPTII,,, | Performed by: ORTHOPAEDIC SURGERY

## 2023-02-03 PROCEDURE — 1159F PR MEDICATION LIST DOCUMENTED IN MEDICAL RECORD: ICD-10-PCS | Mod: CPTII,,, | Performed by: ORTHOPAEDIC SURGERY

## 2023-02-03 PROCEDURE — 20610 DRAIN/INJ JOINT/BURSA W/O US: CPT | Mod: PBBFAC,PN | Performed by: ORTHOPAEDIC SURGERY

## 2023-02-03 PROCEDURE — 99999 PR PBB SHADOW E&M-EST. PATIENT-LVL III: CPT | Mod: PBBFAC,,, | Performed by: ORTHOPAEDIC SURGERY

## 2023-02-03 PROCEDURE — 99213 OFFICE O/P EST LOW 20 MIN: CPT | Mod: PBBFAC,PN | Performed by: ORTHOPAEDIC SURGERY

## 2023-02-03 PROCEDURE — 99024 PR POST-OP FOLLOW-UP VISIT: ICD-10-PCS | Mod: ,,, | Performed by: ORTHOPAEDIC SURGERY

## 2023-02-03 PROCEDURE — 99999 PR PBB SHADOW E&M-EST. PATIENT-LVL III: ICD-10-PCS | Mod: PBBFAC,,, | Performed by: ORTHOPAEDIC SURGERY

## 2023-02-03 PROCEDURE — 3008F PR BODY MASS INDEX (BMI) DOCUMENTED: ICD-10-PCS | Mod: CPTII,,, | Performed by: ORTHOPAEDIC SURGERY

## 2023-02-03 PROCEDURE — 1160F RVW MEDS BY RX/DR IN RCRD: CPT | Mod: CPTII,,, | Performed by: ORTHOPAEDIC SURGERY

## 2023-02-03 PROCEDURE — 99024 POSTOP FOLLOW-UP VISIT: CPT | Mod: ,,, | Performed by: ORTHOPAEDIC SURGERY

## 2023-02-06 ENCOUNTER — CLINICAL SUPPORT (OUTPATIENT)
Dept: REHABILITATION | Facility: HOSPITAL | Age: 38
End: 2023-02-06
Attending: ORTHOPAEDIC SURGERY
Payer: MEDICAID

## 2023-02-06 DIAGNOSIS — M62.81 QUADRICEPS WEAKNESS: ICD-10-CM

## 2023-02-06 DIAGNOSIS — Z74.09 DECREASED FUNCTIONAL MOBILITY AND ENDURANCE: Primary | ICD-10-CM

## 2023-02-06 PROCEDURE — 97110 THERAPEUTIC EXERCISES: CPT | Mod: PN | Performed by: PHYSICAL THERAPIST

## 2023-02-06 NOTE — PROGRESS NOTES
"Patient ID:   Terri Summers is a 37 y.o. female.    Chief Complaint:   Six weeks status post right ACL reconstruction with quadriceps autograft, MQTFL reconstruction with hamstring allograft    HPI:   Mrs. Summers is returning for evaluation of the knee. Overall, she is doing well but does have continued discomfort in the knee. She reports swelling in the knee.     Medications:    Current Outpatient Medications:     acetaminophen (TYLENOL) 325 MG tablet, Take 325 mg by mouth every 6 (six) hours as needed for Pain., Disp: , Rfl:     ALPRAZolam (XANAX) 0.5 MG tablet, Take 1 tablet (0.5 mg total) by mouth daily as needed for Anxiety., Disp: 30 tablet, Rfl: 5    dextroamphetamine-amphetamine (ADDERALL) 30 mg Tab, Take 1 tablet (30 mg total) by mouth 2 (two) times daily., Disp: 180 tablet, Rfl: 0    dextroamphetamine-amphetamine 30 mg Tab, Take 1 tablet (30 mg total) by mouth 2 (two) times daily., Disp: 60 tablet, Rfl: 0    dextroamphetamine-amphetamine 30 mg Tab, Take 1 tablet (30 mg total) by mouth 2 (two) times daily., Disp: 60 tablet, Rfl: 0    ergocalciferol (ERGOCALCIFEROL) 50,000 unit Cap, Take 50,000 Units by mouth every 7 days., Disp: , Rfl:     HYDROcodone-acetaminophen (NORCO) 5-325 mg per tablet, Take 1 tablet by mouth every 6 (six) hours as needed for Pain., Disp: 28 tablet, Rfl: 0    ibuprofen (ADVIL,MOTRIN) 800 MG tablet, TK 1 T PO BID WF PRF PAIN, Disp: , Rfl: 0    meloxicam (MOBIC) 15 MG tablet, Take 15 mg by mouth., Disp: , Rfl:     Allergies:  Review of patient's allergies indicates:  No Known Allergies    Vitals:  BP (!) 153/94   Pulse 93   Ht 5' 6" (1.676 m)   Wt 82.5 kg (181 lb 14.1 oz)   BMI 29.36 kg/m²     Physical Examination:  Ortho Exam   Right knee exam:  2+ effusion.   2 quadrants of patellar translation with solid endpoint  Lachmans 0.  ROM 0-115    Assessment:  1. S/P ACL reconstruction    2. Traumatic dislocation of patella, right, subsequent encounter      Plan:  Discontinue hinged " knee brace  Continue PT  ACL precautions  Arthrocentesis today - pulled off 45 cc   RTC in 6 weeks       No follow-ups on file.

## 2023-02-06 NOTE — PLAN OF CARE
"  Outpatient Therapy Updated Plan of Care     Visit Date: 2/6/2023  Name: Terri Summers  Clinic Number: [id number]    Therapy Diagnosis:   Encounter Diagnoses   Name Primary?    Decreased functional mobility and endurance Yes    Quadriceps weakness      Physician: Shan Crystal MD    Physician Orders: PT Eval and Treat      Medical Diagnosis from Referral:   S83.004D (ICD-10-CM) - Traumatic dislocation of patella, right, subsequent encounter   S83.511A (ICD-10-CM) - Chronic rupture of ACL of right knee      Evaluation Date: 11/22/2022      Total Visits Received: 15    Current Certification Period:  11/22/22 to 1/28/23  Precautions:  Precautions: standard, post-op ACL with quad tendon autograph protocol; NO LOADED FLEXION PAST 90 x 4 months; No brace  Visits from Evaluation Date:  14      Subjective     Update: Dr. Crystal drained her knee on Friday. She said he's happy with her range of motion and told her she didn't think she needed the brace.     Objective     Update:   Right quad: 17.1 kg  Right knee flexion AROM: 125 degree   Right knee flexion PROM: 134 degree   30" sit <> stand: 9x (mild left weight shift)  Squat: nearly even weight shift, 25% depth  Single leg stance: > 10 seconds without upper extremity   Step ups: Poor hamstring eccentric control on ascent and poor quad eccentric control on descent  Gait: without cues, circumducts with poor knee flexion during swing phase and limited stance time   After cues, excessive hip flexion during swing phase, poor eccentric control during swing phase    Assessment     Update: Terri arrives status post ACL repair with quad tendon autograph and MQTFL reconstruction with semitendinosis allograph. She arrives without a device or knee brace with circumducted gait, decreased knee flexion and decreased stance on right leg. Pattern improved with cueing, but poor eccentric control during swing phase and excessive hip flexion present. Quad strength has improved per " microFET testing and knee range of motion is good. Pain has relatively stayed under control. She is progressing as expected following surgery and is appropriate for continued skilled PT to help her reach her goals.    Previous Short Term Goals Status:   MET 2/3  1.  Patient will demonstrate right knee ROM at least 5-0-120 degree. MET  2.  Patient will demonstrate right quad strength by performing 3x10 of active straight leg raise without lag.  MET  3.  Patient will report worst right knee pain < 4/10 to improve ADL performance. PROGRESSING; NOT MET  New Short Term Goals Status:   none  Long Term Goal Status:   continue per initial plan of care.  1.  Patient will demonstrate ability to perform > 12 sit <> stand transfers w/o UE use for improved ability to stand from low surfaces. PROGRESSING; NOT MET  2.  Patient will demonstrate right quad strength via MicroFET of > 27 kg to improve transfers, gait and ADL performance. PROGRESSING; NOT MET  3.  Patient will demonstrate to PT comprehensive understanding of each HEP component without verbal cueing. PROGRESSING; NOT MET  4.  Patient will improve FOTO to < 43% to improve ADL performance. PROGRESSING; NOT MET  5.  Patient will demonstrate ability to negotiate a flight of stairs with reciprocal pattern, no handrail and no compensation or symptoms. PROGRESSING; NOT MET  Reasons for Recertification of Therapy:   UPOC    Plan     Updated Certification Period: 2/6/2023 to 4/7/23  Recommended Treatment Plan: 2 times per week for 8 weeks: Electrical Stimulation TENS, IFC, NMES, Gait Training, Manual Therapy, Moist Heat/ Ice, Neuromuscular Re-ed, Patient Education, Self Care, Therapeutic Activities, Therapeutic Exercise, and dry needling and blood flow restriction therapy  Other Recommendations: BFR; progress per typical quad tendon repair protocol    Shan Dolan, PT  2/6/2023      I CERTIFY THE NEED FOR THESE SERVICES FURNISHED UNDER THIS PLAN OF TREATMENT AND WHILE UNDER MY  CARE    Physician's comments:        Physician's Signature: ___________________________________________________     I certify the need for these services furnished under this plan of treatment and while under my care.        Shan Crystal MD, FAAOS  , Orthopaedic Sports Medicine  Residency   [facility] Department of Orthopaedic Surgery  Assistant Orthopaedic Surgeon, [facility]  Head Team Physician, [facility]

## 2023-02-06 NOTE — PROGRESS NOTES
"[facility]   Physical Therapy Treatment Note     Name: Terri Summers  Clinic Number: [id number]    Therapy Diagnosis:   Encounter Diagnoses   Name Primary?    Decreased functional mobility and endurance Yes    Quadriceps weakness        Physician: Shan Crystal MD    Visit Date: 2/6/2023    Physician Orders: PT Eval and Treat      Medical Diagnosis from Referral:   S83.004D (ICD-10-CM) - Traumatic dislocation of patella, right, subsequent encounter   S83.511A (ICD-10-CM) - Chronic rupture of ACL of right knee      Evaluation Date: 11/22/2022  Plan of Care Expiration: 12/31/22   Progress Note Due: 1/28/22  Visit # / Visits authorized: 9/20 (+6 from 2022),   FOTO: 1/27/2023  PTA Visit: 0/5    Precautions: standard, post-op ACL with quad tendon autograph protocol; NO LOADED FLEXION PAST 90 x 4 months; May unlock hinged knee brace and WBAT.    *PRECAUTION ? Graft at weakest point during this period.  No impact loading activities such as  jumping, running, pivoting, or cutting    Time In: 9:00 AM  Time Out: 10:00 AM  Total Billable Time: 58 minutes (TE x4 )    SUBJECTIVE     Pt reports:  Dr. Crystal drained her knee on Friday. She said he's happy with her range of motion and told her she didn't think she needed the brace.       She was compliant with her home exercise program.    Response to previous treatment: no problems.  Functional change: ambulating with 1 crutch around home    Pain: 3/10  Location: right knee      OBJECTIVE     See re-assessment on 12/16/22    Date of surgery: 12/15/22 by Dr. Crystal  Procedure:  Right ACL reconstruction with quadriceps autograft   Right all-inside lateral meniscus repair  Right medial quadriceps tendon reconstruction with semitendinosis allograft       2/6/23:  Right quad: 17.1 kg  Right knee AROM: 125 degree   Right knee PROM: 134 degree   30" sit <> stand: 9x (mild left weight shift)  Single leg stance: > 10 seconds without upper extremity   Step " "ups: Poor hamstring eccentric control on ascent and poor quad eccentric control on descent    Treatment     Terri received the treatments listed below      therapeutic exercises to develop strength, endurance, ROM, flexibility, posture, and core stabilization for  minutes 58 including:    NUSTEP: 5 minutes level 1 for improved cardiovascular endurance    SLR flexion : 30x  SLR abduction  30x   Right single leg Bridges 2x10 right   Heel slides with straps: 20x5" holds  Long arc quad: right 30x    Mini squats 4x10 VC and demo for hip hinge.       Step ups: 4" step: 3x10   Cone taps: 3x10 each (work on not circumducting and WB stability/balance)  Backwards walkin ft x 2  4 hurdles: red 3 laps step to with each ; 3 laps reciprocal; 3 laps side stepping  Right leg press: seat 4, 3 plates 30x  Right heel raise on leg press: seat 2, 2 plates    TKE: Green theraband 20x5" holds  Single leg stance: 10x10" holds (NO upper extremity use)  BOSU stance (round side down): 2x1  min      Not today:  Blood flow restriction therapy cell swellin% LOP (UOP: 210 mmHg; 80% = 168 mmHg) with quad sets 5 minutes on: 3 minutes rest x 3; 10:3 contract:relax ratio     Patient Education and Home Exercises     Home Exercises Provided and Patient Education Provided     Education provided:   - use of and fitting of hinge brace  - benefits of NMES and TENS - units to purchase  - blood flow restriction therapy  - sitting with brace unlocked for a little at home    Written Home Exercises Provided: yes. Exercises were reviewed and Terri was able to demonstrate them prior to the end of the session.  Terri demonstrated good  understanding of the education provided. See EMR under Patient Instructions for exercises provided during therapy sessions    ASSESSMENT     Terri arrives status post ACL repair with quad tendon autograph and MQTFL reconstruction with semitendinosis allograph. She arrives without a device or knee brace with circumducted " gait, decreased knee flexion and decreased stance on right leg. Quad strength has improved per microFET testing and knee range of motion is good. See UPOC.    Pt prognosis is Good.     Pt will continue to benefit from skilled outpatient physical therapy to address the deficits listed in the problem list box on initial evaluation, provide pt/family education and to maximize pt's level of independence in the home and community environment.     Pt's spiritual, cultural and educational needs considered and pt agreeable to plan of care and goals.     Anticipated barriers to physical therapy: chronic nature of symptoms    Goals:   Short Term Goals: 6 weeks   1.  Patient will demonstrate right knee ROM at least 5-0-120 degree. MET  2.  Patient will demonstrate right quad strength by performing 3x10 of active straight leg raise without lag.  MET  3.  Patient will report worst right knee pain < 4/10 to improve ADL performance. PROGRESSING; NOT MET     Long Term Goals: 16 weeks   1.  Patient will demonstrate ability to perform > 12 sit <> stand transfers w/o UE use for improved ability to stand from low surfaces. PROGRESSING; NOT MET  2.  Patient will demonstrate right quad strength via MicroFET of > 27 kg to improve transfers, gait and ADL performance. PROGRESSING; NOT MET  3.  Patient will demonstrate to PT comprehensive understanding of each HEP component without verbal cueing. PROGRESSING; NOT MET  4.  Patient will improve FOTO to < 43% to improve ADL performance. PROGRESSING; NOT MET  5.  Patient will demonstrate ability to negotiate a flight of stairs with reciprocal pattern, no handrail and no compensation or symptoms. PROGRESSING; NOT MET    PLAN     Cont with POC per ACL quad tendon protocol  Currently phase IV (squats/leg press 0-60 degree, single leg stance/stability exercises)  BFR cell swelling PRN    Shan Dolan, PT

## 2023-02-06 NOTE — PROCEDURES
Large Joint Aspiration/Injection: R knee    Date/Time: 2/3/2023 10:30 AM  Performed by: Shan Crystal MD  Authorized by: Shan Crystal MD     Consent Done?:  Yes (Verbal)  Indications:  Joint swelling  Site marked: the procedure site was marked    Timeout: prior to procedure the correct patient, procedure, and site was verified    Prep: patient was prepped and draped in usual sterile fashion      Local anesthesia used?: Yes    Anesthesia:  Local infiltration  Local anesthetic:  Lidocaine 1% without epinephrine  Anesthetic total (ml):  8      Details:  Needle Size:  18 G  Ultrasonic Guidance for needle placement?: No    Approach:  Lateral  Location:  Knee  Site:  R knee  Aspirate:  Blood-tinged  Patient tolerance:  Patient tolerated the procedure well with no immediate complications

## 2023-02-08 ENCOUNTER — CLINICAL SUPPORT (OUTPATIENT)
Dept: REHABILITATION | Facility: HOSPITAL | Age: 38
End: 2023-02-08
Payer: MEDICAID

## 2023-02-08 DIAGNOSIS — M62.81 QUADRICEPS WEAKNESS: ICD-10-CM

## 2023-02-08 DIAGNOSIS — Z74.09 DECREASED FUNCTIONAL MOBILITY AND ENDURANCE: Primary | ICD-10-CM

## 2023-02-08 PROCEDURE — 97110 THERAPEUTIC EXERCISES: CPT | Mod: PN,CQ

## 2023-02-08 NOTE — PROGRESS NOTES
"[facility]   Physical Therapy Treatment Note     Name: Terri Summers  Clinic Number: [id number]    Therapy Diagnosis:   Encounter Diagnoses   Name Primary?    Decreased functional mobility and endurance Yes    Quadriceps weakness        Physician: Shan Crystal MD    Visit Date: 2/8/2023    Physician Orders: PT Eval and Treat      Medical Diagnosis from Referral:   S83.004D (ICD-10-CM) - Traumatic dislocation of patella, right, subsequent encounter   S83.511A (ICD-10-CM) - Chronic rupture of ACL of right knee      Evaluation Date: 11/22/2022  Plan of Care Expiration: 12/31/22   Progress Note Due: 1/28/22  Visit # / Visits authorized: 10/20 (+6 from 2022),   FOTO: 1/27/2023  PTA Visit: 1/5    Precautions: standard, post-op ACL with quad tendon autograph protocol; NO LOADED FLEXION PAST 90 x 4 months; May unlock hinged knee brace and WBAT.    *PRECAUTION ? Graft at weakest point during this period.  No impact loading activities such as  jumping, running, pivoting, or cutting    Time In: 9:02 AM  Time Out: 0956 AM  Total Billable Time: 5 minutes (TE x4 )    SUBJECTIVE     Pt reports:  Patient reports 2/10 pain in knee this visit. Patient states she thinks her knee if back to pre drainage size.       She was compliant with her home exercise program.    Response to previous treatment: no problems.  Functional change: ambulating with 1 crutch around home    Pain: 3/10  Location: right knee      OBJECTIVE     See re-assessment on 12/16/22    Date of surgery: 12/15/22 by Dr. Crystal  Procedure:  Right ACL reconstruction with quadriceps autograft   Right all-inside lateral meniscus repair  Right medial quadriceps tendon reconstruction with semitendinosis allograft       2/6/23:  Right quad: 17.1 kg  Right knee AROM: 125 degree   Right knee PROM: 134 degree   30" sit <> stand: 9x (mild left weight shift)  Single leg stance: > 10 seconds without upper extremity   Step ups: Poor hamstring " "eccentric control on ascent and poor quad eccentric control on descent    Treatment     Terri received the treatments listed below      therapeutic exercises to develop strength, endurance, ROM, flexibility, posture, and core stabilization for  minutes 58 including:    NUSTEP: 5 minutes level 1 for improved cardiovascular endurance    SLR flexion : 30x  SLR abduction  30x   Right single leg Bridges 2x10 right   Heel slides with straps: 20x5" holds  Long arc quad: right 30x    Mini squats 4x10 VC and demo for hip hinge.       Step ups: 4" step: 3x10   Cone taps: 3x10 each (work on not circumducting and WB stability/balance)  Backwards walkin ft x 2  4 hurdles: red 3 laps step to with each ; 3 laps reciprocal; 3 laps side stepping  Right leg press: seat 4, 3 plates 30x  Right heel raise on leg press: seat 2, 2 plates    TKE: Green theraband 20x5" holds  Single leg stance: 10x10" holds (NO upper extremity use)  Fitter First Balance Board:Static Balance (A/P) (S/S) 2x1' ea   High knees Laps x2 around clinic (150 ft ea)       Not today:  Blood flow restriction therapy cell swellin% LOP (UOP: 210 mmHg; 80% = 168 mmHg) with quad sets 5 minutes on: 3 minutes rest x 3; 10:3 contract:relax ratio     BOSU stance (round side down): 2x1  min  Patient Education and Home Exercises     Home Exercises Provided and Patient Education Provided     Education provided:   - use of and fitting of hinge brace  - benefits of NMES and TENS - units to purchase  - blood flow restriction therapy  - sitting with brace unlocked for a little at home    Written Home Exercises Provided: yes. Exercises were reviewed and Terri was able to demonstrate them prior to the end of the session.  Terri demonstrated good  understanding of the education provided. See EMR under Patient Instructions for exercises provided during therapy sessions    ASSESSMENT     Terri arrives status post ACL repair with quad tendon autograph and MQTFL reconstruction " with semitendinosis allograph. She arrives without a device or knee brace with circumducted gait, decreased knee flexion and decreased stance on right leg. Patient tolerates tx well today with out provocation of pain. Initiated High knee stepping for improved hip flexion in gait reducing circumduction.     Pt prognosis is Good.     Pt will continue to benefit from skilled outpatient physical therapy to address the deficits listed in the problem list box on initial evaluation, provide pt/family education and to maximize pt's level of independence in the home and community environment.     Pt's spiritual, cultural and educational needs considered and pt agreeable to plan of care and goals.     Anticipated barriers to physical therapy: chronic nature of symptoms    Goals:   Short Term Goals: 6 weeks   1.  Patient will demonstrate right knee ROM at least 5-0-120 degree. MET  2.  Patient will demonstrate right quad strength by performing 3x10 of active straight leg raise without lag.  MET  3.  Patient will report worst right knee pain < 4/10 to improve ADL performance. PROGRESSING; NOT MET     Long Term Goals: 16 weeks   1.  Patient will demonstrate ability to perform > 12 sit <> stand transfers w/o UE use for improved ability to stand from low surfaces. PROGRESSING; NOT MET  2.  Patient will demonstrate right quad strength via MicroFET of > 27 kg to improve transfers, gait and ADL performance. PROGRESSING; NOT MET  3.  Patient will demonstrate to PT comprehensive understanding of each HEP component without verbal cueing. PROGRESSING; NOT MET  4.  Patient will improve FOTO to < 43% to improve ADL performance. PROGRESSING; NOT MET  5.  Patient will demonstrate ability to negotiate a flight of stairs with reciprocal pattern, no handrail and no compensation or symptoms. PROGRESSING; NOT MET    PLAN     Cont with POC per ACL quad tendon protocol  Currently phase IV (squats/leg press 0-60 degree, single leg stance/stability  exercises)  BFR cell swelling PRN    Derrell Sulaiman, PTA

## 2023-02-13 ENCOUNTER — CLINICAL SUPPORT (OUTPATIENT)
Dept: REHABILITATION | Facility: HOSPITAL | Age: 38
End: 2023-02-13
Payer: MEDICAID

## 2023-02-13 DIAGNOSIS — Z74.09 DECREASED FUNCTIONAL MOBILITY AND ENDURANCE: Primary | ICD-10-CM

## 2023-02-13 DIAGNOSIS — M62.81 QUADRICEPS WEAKNESS: ICD-10-CM

## 2023-02-13 PROCEDURE — 97110 THERAPEUTIC EXERCISES: CPT | Mod: PN | Performed by: PHYSICAL THERAPIST

## 2023-02-13 NOTE — PROGRESS NOTES
"[facility]   Physical Therapy Treatment Note     Name: Terri Summers  Clinic Number: [id number]    Therapy Diagnosis:   Encounter Diagnoses   Name Primary?    Decreased functional mobility and endurance Yes    Quadriceps weakness        Physician: Shan Crystal MD    Visit Date: 2/13/2023    Physician Orders: PT Eval and Treat      Medical Diagnosis from Referral:   S83.004D (ICD-10-CM) - Traumatic dislocation of patella, right, subsequent encounter   S83.511A (ICD-10-CM) - Chronic rupture of ACL of right knee      Evaluation Date: 11/22/2022  Plan of Care Expiration: 4/7/23   Progress Note Due: 4/7/23  Visit # / Visits authorized: 11/20 (+6 from 2022),   FOTO: 1/27/2023  PTA Visit: 0/5    Precautions: standard, post-op ACL with quad tendon autograph protocol; NO LOADED FLEXION PAST 90 x 4 months; May unlock hinged knee brace and WBAT.    *PRECAUTION ? Graft at weakest point during this period.  No impact loading activities such as  jumping, running, pivoting, or cutting    Time In: 8:07 AM  Time Out: 0901 AM  Total Billable Time: 54 minutes (TE x4 )    SUBJECTIVE     Pt reports:  Patient reports 2/10 pain in knee this visit. She's most uncomfortable sleeping at night    She was compliant with her home exercise program.    Response to previous treatment: no problems.  Functional change: ambulating with 1 crutch around home    Pain: 3/10  Location: right knee      OBJECTIVE     See re-assessment on 12/16/22    Date of surgery: 12/15/22 by Dr. Crystal  Procedure:  Right ACL reconstruction with quadriceps autograft   Right all-inside lateral meniscus repair  Right medial quadriceps tendon reconstruction with semitendinosis allograft       2/6/23:  Right quad: 17.1 kg  Right knee AROM: 125 degree   Right knee PROM: 134 degree   30" sit <> stand: 9x (mild left weight shift)  Single leg stance: > 10 seconds without upper extremity   Step ups: Poor hamstring eccentric control on ascent and " "poor quad eccentric control on descent    Treatment     Terri received the treatments listed below      therapeutic exercises to develop strength, endurance, ROM, flexibility, posture, and core stabilization for  minutes 54 including:    NUSTEP: 8 minutes level 4 for improved cardiovascular endurance    SLR flexion : 30x  SLR abduction  30x   Right single leg Bridges 2x10 right   Long arc quad: right 30x    Mini squats 4x10 (no cues needed today)    Step ups: 4" step: 3x10   Cone taps: 3x10 each (work on not circumducting and WB stability/balance)  Backwards walkin ft x 4  4 hurdles: red 3 laps step to with each ; 3 laps reciprocal; 3 laps side stepping  Right leg press: seat 4, 3 plates 30x  Right heel raise on leg press: seat 2, 2 plates    Lateral walks: yellow theraband in // bars 3 laps  TKE: 3 plates at multi hip 30x5" holds  Single leg stance: 10x10" holds (NO upper extremity use)  Fitter First Balance Board:Static Balance (A/P) (S/S) 2x1' ea    Not today:   High knees Laps x2 around clinic (150 ft ea)       Not today:  Blood flow restriction therapy cell swellin% LOP (UOP: 210 mmHg; 80% = 168 mmHg) with quad sets 5 minutes on: 3 minutes rest x 3; 10:3 contract:relax ratio     BOSU stance (round side down): 2x1  min  Patient Education and Home Exercises     Home Exercises Provided and Patient Education Provided     Education provided:   - use of and fitting of hinge brace  - benefits of NMES and TENS - units to purchase  - blood flow restriction therapy  - sitting with brace unlocked for a little at home    Written Home Exercises Provided: yes. Exercises were reviewed and Terri was able to demonstrate them prior to the end of the session.  Terri demonstrated good  understanding of the education provided. See EMR under Patient Instructions for exercises provided during therapy sessions    ASSESSMENT     Terri arrives status post ACL repair with quad tendon autograph and MQTFL reconstruction with " semitendinosis allograph. Cues needed to avoid lag with straight leg raise today, but 5 degree hyperextension present at rest. Flexion AROM 132 degree today as well. Balance deficits seen with fitter board in both directions today. Good form with squats today without cues needed.    Pt prognosis is Good.     Pt will continue to benefit from skilled outpatient physical therapy to address the deficits listed in the problem list box on initial evaluation, provide pt/family education and to maximize pt's level of independence in the home and community environment.     Pt's spiritual, cultural and educational needs considered and pt agreeable to plan of care and goals.     Anticipated barriers to physical therapy: chronic nature of symptoms    Goals:   Short Term Goals: 6 weeks   1.  Patient will demonstrate right knee ROM at least 5-0-120 degree. MET  2.  Patient will demonstrate right quad strength by performing 3x10 of active straight leg raise without lag.  MET  3.  Patient will report worst right knee pain < 4/10 to improve ADL performance. MET     Long Term Goals: 16 weeks   1.  Patient will demonstrate ability to perform > 12 sit <> stand transfers w/o UE use for improved ability to stand from low surfaces. PROGRESSING; NOT MET  2.  Patient will demonstrate right quad strength via MicroFET of > 27 kg to improve transfers, gait and ADL performance. PROGRESSING; NOT MET  3.  Patient will demonstrate to PT comprehensive understanding of each HEP component without verbal cueing. PROGRESSING; NOT MET  4.  Patient will improve FOTO to < 43% to improve ADL performance. PROGRESSING; NOT MET  5.  Patient will demonstrate ability to negotiate a flight of stairs with reciprocal pattern, no handrail and no compensation or symptoms. PROGRESSING; NOT MET    PLAN     Cont with POC per ACL quad tendon protocol  Currently phase IV (squats/leg press 0-60 degree, single leg stance/stability exercises)  BFR PRN    Shan Dolan,  PT

## 2023-02-13 NOTE — PROGRESS NOTES
"[facility]   Physical Therapy Treatment Note     Name: Terri Summers  Clinic Number: [id number]    Therapy Diagnosis:   No diagnosis found.      Physician: No ref. provider found    Visit Date: 2/13/2023    Physician Orders: PT Eval and Treat      Medical Diagnosis from Referral:   S83.004D (ICD-10-CM) - Traumatic dislocation of patella, right, subsequent encounter   S83.511A (ICD-10-CM) - Chronic rupture of ACL of right knee      Evaluation Date: 11/22/2022  Plan of Care Expiration: 4/7/23   Progress Note Due: 4/7/23  Visit # / Visits authorized: 11/20 (+6 from 2022),   FOTO: 1/27/2023  PTA Visit: 0/5    Precautions: standard, post-op ACL with quad tendon autograph protocol; NO LOADED FLEXION PAST 90 x 4 months; May unlock hinged knee brace and WBAT.    *PRECAUTION ? Graft at weakest point during this period.  No impact loading activities such as  jumping, running, pivoting, or cutting    Time In: 8:08 AM  Time Out: 0956 AM  Total Billable Time: 5 minutes (TE x4 )    SUBJECTIVE     Pt reports:  Patient reports 2/10 pain in knee this visit. Patient states she thinks her knee if back to pre drainage size.       She was compliant with her home exercise program.    Response to previous treatment: no problems.  Functional change: ambulating with 1 crutch around home    Pain: 3/10  Location: right knee      OBJECTIVE     See re-assessment on 12/16/22    Date of surgery: 12/15/22 by Dr. Crystal  Procedure:  Right ACL reconstruction with quadriceps autograft   Right all-inside lateral meniscus repair  Right medial quadriceps tendon reconstruction with semitendinosis allograft       2/6/23:  Right quad: 17.1 kg  Right knee AROM: 125 degree   Right knee PROM: 134 degree   30" sit <> stand: 9x (mild left weight shift)  Single leg stance: > 10 seconds without upper extremity   Step ups: Poor hamstring eccentric control on ascent and poor quad eccentric control on descent    Treatment     Terri" "received the treatments listed below      therapeutic exercises to develop strength, endurance, ROM, flexibility, posture, and core stabilization for  minutes 58 including:    NUSTEP: 5 minutes level 1 for improved cardiovascular endurance    SLR flexion : 30x  SLR abduction  30x   Right single leg Bridges 2x10 right   Long arc quad: right 30x    Mini squats 4x10 VC and demo for hip hinge.     Step ups: 4" step: 3x10   Cone taps: 3x10 each (work on not circumducting and WB stability/balance)  Backwards walkin ft x 2  4 hurdles: red 3 laps step to with each ; 3 laps reciprocal; 3 laps side stepping  Right leg press: seat 4, 3 plates 30x  Right heel raise on leg press: seat 2, 2 plates    TKE: Green theraband 20x5" holds  Single leg stance: 10x10" holds (NO upper extremity use)  Fitter First Balance Board:Static Balance (A/P) (S/S) 2x1' ea   High knees Laps x2 around clinic (150 ft ea)       Not today:  Blood flow restriction therapy cell swellin% LOP (UOP: 210 mmHg; 80% = 168 mmHg) with quad sets 5 minutes on: 3 minutes rest x 3; 10:3 contract:relax ratio     BOSU stance (round side down): 2x1  min  Patient Education and Home Exercises     Home Exercises Provided and Patient Education Provided     Education provided:   - use of and fitting of hinge brace  - benefits of NMES and TENS - units to purchase  - blood flow restriction therapy  - sitting with brace unlocked for a little at home    Written Home Exercises Provided: yes. Exercises were reviewed and Terri was able to demonstrate them prior to the end of the session.  Terri demonstrated good  understanding of the education provided. See EMR under Patient Instructions for exercises provided during therapy sessions    ASSESSMENT     Terri arrives status post ACL repair with quad tendon autograph and MQTFL reconstruction with semitendinosis allograph. She arrives without a device or knee brace with circumducted gait, decreased knee flexion and decreased " stance on right leg. Patient tolerates tx well today with out provocation of pain. Initiated High knee stepping for improved hip flexion in gait reducing circumduction.     Pt prognosis is Good.     Pt will continue to benefit from skilled outpatient physical therapy to address the deficits listed in the problem list box on initial evaluation, provide pt/family education and to maximize pt's level of independence in the home and community environment.     Pt's spiritual, cultural and educational needs considered and pt agreeable to plan of care and goals.     Anticipated barriers to physical therapy: chronic nature of symptoms    Goals:   Short Term Goals: 6 weeks   1.  Patient will demonstrate right knee ROM at least 5-0-120 degree. MET  2.  Patient will demonstrate right quad strength by performing 3x10 of active straight leg raise without lag.  MET  3.  Patient will report worst right knee pain < 4/10 to improve ADL performance. PROGRESSING; NOT MET     Long Term Goals: 16 weeks   1.  Patient will demonstrate ability to perform > 12 sit <> stand transfers w/o UE use for improved ability to stand from low surfaces. PROGRESSING; NOT MET  2.  Patient will demonstrate right quad strength via MicroFET of > 27 kg to improve transfers, gait and ADL performance. PROGRESSING; NOT MET  3.  Patient will demonstrate to PT comprehensive understanding of each HEP component without verbal cueing. PROGRESSING; NOT MET  4.  Patient will improve FOTO to < 43% to improve ADL performance. PROGRESSING; NOT MET  5.  Patient will demonstrate ability to negotiate a flight of stairs with reciprocal pattern, no handrail and no compensation or symptoms. PROGRESSING; NOT MET    PLAN     Cont with POC per ACL quad tendon protocol  Currently phase IV (squats/leg press 0-60 degree, single leg stance/stability exercises)  BFR cell swelling PRN    Shan Dolan, PT

## 2023-02-15 ENCOUNTER — CLINICAL SUPPORT (OUTPATIENT)
Dept: REHABILITATION | Facility: HOSPITAL | Age: 38
End: 2023-02-15
Payer: MEDICAID

## 2023-02-15 DIAGNOSIS — M62.81 QUADRICEPS WEAKNESS: ICD-10-CM

## 2023-02-15 DIAGNOSIS — Z74.09 DECREASED FUNCTIONAL MOBILITY AND ENDURANCE: Primary | ICD-10-CM

## 2023-02-15 PROCEDURE — 97110 THERAPEUTIC EXERCISES: CPT | Mod: PN

## 2023-02-15 NOTE — PROGRESS NOTES
"[facility]   Physical Therapy Treatment Note     Name: Terri Summers  Clinic Number: [id number]    Therapy Diagnosis:   Encounter Diagnoses   Name Primary?    Decreased functional mobility and endurance Yes    Quadriceps weakness      Physician: Shan Crystal MD    Visit Date: 2/15/2023    Physician Orders: PT Eval and Treat      Medical Diagnosis from Referral:   S83.004D (ICD-10-CM) - Traumatic dislocation of patella, right, subsequent encounter   S83.511A (ICD-10-CM) - Chronic rupture of ACL of right knee      Evaluation Date: 11/22/2022  Plan of Care Expiration: 4/7/23   Progress Note Due: 4/7/23  Visit # / Visits authorized: 12/20 (+6 from 2022),   FOTO: 1/27/2023  PTA Visit: 0/5    Precautions: standard, post-op ACL with quad tendon autograph protocol; NO LOADED FLEXION PAST 90 x 4 months; May unlock hinged knee brace and WBAT.    *PRECAUTION ? Graft at weakest point during this period.  No impact loading activities such as  jumping, running, pivoting, or cutting    Time In: 8:15 AM  Time Out: 09:20 AM  Total Billable Time: 65 minutes (TE x4 )    SUBJECTIVE     Pt reports:  knee pain is more anterior along her front facing scar that is burning in nature.     She was compliant with her home exercise program.    Response to previous treatment: no problems.  Functional change: ambulating with 1 crutch around home    Pain: 2/10  Location: right knee      OBJECTIVE     See re-assessment on 12/16/22    Date of surgery: 12/15/22 by Dr. Crystal  Procedure:  Right ACL reconstruction with quadriceps autograft   Right all-inside lateral meniscus repair  Right medial quadriceps tendon reconstruction with semitendinosis allograft     2/6/23:  Right quad: 17.1 kg  Right knee AROM: 125 degree   Right knee PROM: 134 degree   30" sit <> stand: 9x (mild left weight shift)  Single leg stance: > 10 seconds without upper extremity   Step ups: Poor hamstring eccentric control on ascent and poor quad " "eccentric control on descent    Treatment     Terri received the treatments listed below      therapeutic exercises to develop strength, endurance, ROM, flexibility, posture, and core stabilization for minutes 65 including:    NUSTEP: 5 minutes level 4 for improved cardiovascular endurance    Blood flow resistance trainin% LOP (UOP: 210; 60% = 126 mmHg) with following exercises performing 30, 15, 15, 15 with 30 second rest breaks between sets   SLR flexion : 30x  SLR abduction  30x   Long arc quad: right 30x    Right single leg Bridges 3x10 right  Mini squats 4x10 (no cues needed today)    Step ups: 4" step: 2x15 (cues for decreased speed)  Cone taps: 3x10 each (work on not circumducting and WB stability/balance) - not today  Backwards walkin ft x 3  7 hurdles: small yellow 3 laps step through ; 3 laps reciprocal; 3 laps side stepping; 3 laps backwards  Right leg press: seat 4, 4 plates 3x15  Right heel raise on leg press: seat 2, 4 plates, 2x15 right    Not today  Lateral walks: yellow theraband in // bars 3 laps  TKE: 3 plates at multi hip 30x5" holds  Single leg stance: 10x10" holds (NO upper extremity use)  Fitter First Balance Board:Static Balance (A/P) (S/S) 2x1' ea  High knees Laps x2 around clinic (150 ft ea)     Not today:  Blood flow restriction therapy cell swellin% LOP (UOP: 210 mmHg; 80% = 168 mmHg) with quad sets 5 minutes on: 3 minutes rest x 3; 10:3 contract:relax ratio     BOSU stance (round side down): 2x1  minutes    Patient Education and Home Exercises     Home Exercises Provided and Patient Education Provided     Education provided:   - use of and fitting of hinge brace  - benefits of NMES and TENS - units to purchase  - blood flow restriction therapy  - sitting with brace unlocked for a little at home    Written Home Exercises Provided: yes. Exercises were reviewed and Terri was able to demonstrate them prior to the end of the session.  Terri demonstrated good  understanding of " the education provided. See EMR under Patient Instructions for exercises provided during therapy sessions    ASSESSMENT     Terri arrives status post ACL repair with quad tendon autograph and MQTFL reconstruction with semitendinosis allograph. No straight leg raise lag today, and slight deficits in knee hyperextension. She is tolerating BFR better and good muscle fatigue after session. She is two weeks removed from brace use, and some lingering gait deviations that improve with cueing. Avoiding pain over 4/10 with all activities.     Pt prognosis is Good.     Pt will continue to benefit from skilled outpatient physical therapy to address the deficits listed in the problem list box on initial evaluation, provide pt/family education and to maximize pt's level of independence in the home and community environment.     Pt's spiritual, cultural and educational needs considered and pt agreeable to plan of care and goals.     Anticipated barriers to physical therapy: chronic nature of symptoms    Goals:   Short Term Goals: 6 weeks   1.  Patient will demonstrate right knee ROM at least 5-0-120 degree. MET  2.  Patient will demonstrate right quad strength by performing 3x10 of active straight leg raise without lag.  MET  3.  Patient will report worst right knee pain < 4/10 to improve ADL performance. MET     Long Term Goals: 16 weeks   1.  Patient will demonstrate ability to perform > 12 sit <> stand transfers w/o UE use for improved ability to stand from low surfaces. PROGRESSING; NOT MET  2.  Patient will demonstrate right quad strength via MicroFET of > 27 kg to improve transfers, gait and ADL performance. PROGRESSING; NOT MET  3.  Patient will demonstrate to PT comprehensive understanding of each HEP component without verbal cueing. PROGRESSING; NOT MET  4.  Patient will improve FOTO to < 43% to improve ADL performance. PROGRESSING; NOT MET  5.  Patient will demonstrate ability to negotiate a flight of stairs with  reciprocal pattern, no handrail and no compensation or symptoms. PROGRESSING; NOT MET    PLAN     Cont with POC per ACL quad tendon protocol  Currently phase IV (squats/leg press 0-60 degree, single leg stance/stability exercises)  BFR PRN    Darius Moore, PT

## 2023-02-20 ENCOUNTER — OFFICE VISIT (OUTPATIENT)
Dept: PSYCHIATRY | Facility: CLINIC | Age: 38
End: 2023-02-20
Payer: MEDICAID

## 2023-02-20 ENCOUNTER — PATIENT MESSAGE (OUTPATIENT)
Dept: PSYCHIATRY | Facility: CLINIC | Age: 38
End: 2023-02-20
Payer: MEDICAID

## 2023-02-20 DIAGNOSIS — F90.2 ADHD (ATTENTION DEFICIT HYPERACTIVITY DISORDER), COMBINED TYPE: ICD-10-CM

## 2023-02-20 DIAGNOSIS — F41.1 GENERALIZED ANXIETY DISORDER WITH PANIC ATTACKS: ICD-10-CM

## 2023-02-20 DIAGNOSIS — F41.0 GENERALIZED ANXIETY DISORDER WITH PANIC ATTACKS: ICD-10-CM

## 2023-02-20 PROCEDURE — 99214 OFFICE O/P EST MOD 30 MIN: CPT | Mod: SA,HB,95, | Performed by: NURSE PRACTITIONER

## 2023-02-20 PROCEDURE — 1160F PR REVIEW ALL MEDS BY PRESCRIBER/CLIN PHARMACIST DOCUMENTED: ICD-10-PCS | Mod: SA,HB,CPTII,95 | Performed by: NURSE PRACTITIONER

## 2023-02-20 PROCEDURE — 1159F MED LIST DOCD IN RCRD: CPT | Mod: SA,HB,CPTII,95 | Performed by: NURSE PRACTITIONER

## 2023-02-20 PROCEDURE — 1160F RVW MEDS BY RX/DR IN RCRD: CPT | Mod: SA,HB,CPTII,95 | Performed by: NURSE PRACTITIONER

## 2023-02-20 PROCEDURE — 1159F PR MEDICATION LIST DOCUMENTED IN MEDICAL RECORD: ICD-10-PCS | Mod: SA,HB,CPTII,95 | Performed by: NURSE PRACTITIONER

## 2023-02-20 PROCEDURE — 99214 PR OFFICE/OUTPT VISIT, EST, LEVL IV, 30-39 MIN: ICD-10-PCS | Mod: SA,HB,95, | Performed by: NURSE PRACTITIONER

## 2023-02-20 RX ORDER — DEXTROAMPHETAMINE SACCHARATE, AMPHETAMINE ASPARTATE, DEXTROAMPHETAMINE SULFATE AND AMPHETAMINE SULFATE 7.5; 7.5; 7.5; 7.5 MG/1; MG/1; MG/1; MG/1
30 TABLET ORAL 2 TIMES DAILY
Qty: 60 TABLET | Refills: 0 | Status: SHIPPED | OUTPATIENT
Start: 2023-03-19 | End: 2023-02-24 | Stop reason: SDUPTHER

## 2023-02-20 RX ORDER — ALPRAZOLAM 0.5 MG/1
0.5 TABLET ORAL DAILY PRN
Qty: 30 TABLET | Refills: 5 | Status: SHIPPED | OUTPATIENT
Start: 2023-02-20 | End: 2023-08-08 | Stop reason: SDUPTHER

## 2023-02-20 RX ORDER — DEXTROAMPHETAMINE SACCHARATE, AMPHETAMINE ASPARTATE, DEXTROAMPHETAMINE SULFATE AND AMPHETAMINE SULFATE 7.5; 7.5; 7.5; 7.5 MG/1; MG/1; MG/1; MG/1
30 TABLET ORAL 2 TIMES DAILY
Qty: 60 TABLET | Refills: 0 | Status: SHIPPED | OUTPATIENT
Start: 2023-04-18 | End: 2023-02-24 | Stop reason: SDUPTHER

## 2023-02-20 RX ORDER — DEXTROAMPHETAMINE SACCHARATE, AMPHETAMINE ASPARTATE, DEXTROAMPHETAMINE SULFATE AND AMPHETAMINE SULFATE 7.5; 7.5; 7.5; 7.5 MG/1; MG/1; MG/1; MG/1
30 TABLET ORAL 2 TIMES DAILY
Qty: 60 TABLET | Refills: 0 | Status: SHIPPED | OUTPATIENT
Start: 2023-02-20 | End: 2023-02-24 | Stop reason: SDUPTHER

## 2023-02-20 NOTE — PROGRESS NOTES
Outpatient Psychiatry Follow-Up Visit (MD/NP)    2/20/2023    Clinical Status of Patient:  Outpatient (Ambulatory)    Chief Complaint:  Terri Summers is a 37 y.o. female who presents today for follow-up of attention problems.  Met with patient.      Last visit was: 11/23//22  Audio Only Telehealth Visit     The patient location is: home  The chief complaint leading to consultation is: attention problems  Visit type: Virtual visit with audio only (telephone)  Total time spent with patient: 30 minutes     The reason for the audio only service rather than synchronous audio and video virtual visit was related to technical difficulties or patient preference/necessity.     Each patient to whom I provide medical services by telemedicine is:  (1) informed of the relationship between the physician and patient and the respective role of any other health care provider with respect to management of the patient; and (2) notified that they may decline to receive medical services by telemedicine and may withdraw from such care at any time. Patient verbally consented to receive this service via voice-only telephone call.    This service was not originating from a related E/M service provided within the previous 7 days nor will  to an E/M service or procedure within the next 24 hours or my soonest available appointment.  Prevailing standard of care was able to be met in this audio-only visit.       Interval History and Content of Current Session:  Current Psychiatric Medications/changes  Continue Adderall 30 mg po BID  Continue Xanax 0.5 mg po daily PRN panic attacks    Audio Only: due to tech failure. : Pt presents bright affect and euthymic mood. Reports improvement in anxiety and ADH symptoms with current medications. Reports that she is doing well. Denies SI/HI/AVH. Will continue meds    Psychotherapy:  Target symptoms: distractability, lack of focus  Why chosen therapy is appropriate versus another modality: relevant to  diagnosis  Outcome monitoring methods: self-report  Therapeutic intervention type: insight oriented psychotherapy  Topics discussed/themes: building skills sets for symptom management, symptom recognition  The patient's response to the intervention is accepting. The patient's progress toward treatment goals is good.   Duration of intervention: 14 minutes.    Review of Systems   PSYCHIATRIC: Pertinant items are noted in the narrative.  CONSTITUTIONAL: No weight gain or loss.   MUSCULOSKELETAL: No pain or stiffness of the joints.  NEUROLOGIC: No weakness, sensory changes, seizures, confusion, memory loss, tremor or other abnormal movements.  ENDOCRINE: No polydipsia or polyuria.  INTEGUMENTARY: No rashes or lacerations.  EYES: No exophthalmos, jaundice or blindness.  ENT: No dizziness, tinnitus or hearing loss.  RESPIRATORY: No shortness of breath.  CARDIOVASCULAR: No tachycardia or chest pain.  GASTROINTESTINAL: No nausea, vomiting, pain, constipation or diarrhea.  GENITOURINARY: No frequency, dysuria or sexual dysfunction.  HEMATOLOGIC/LYMPHATIC: No excessive bleeding, prolonged or excessive bleeding after dental extraction/injury.  ALLERGIC/IMMUNOLOGIC: No allergic response to materials, foods or animals at this time.    Past Medical, Family and Social History: The patient's past medical, family and social history have been reviewed and updated as appropriate within the electronic medical record - see encounter notes.    Compliance: no    Side effects: None    Risk Parameters:  Patient reports no suicidal ideation  Patient reports no homicidal ideation  Patient reports no self-injurious behavior  Patient reports no violent behavior    Exam (detailed: at least 9 elements; comprehensive: all 15 elements)   Constitutional  Vitals:  Most recent vital signs, dated greater than 90 days prior to this appointment, were not reviewed.   There were no vitals filed for this visit.     General:  unremarkable, age appropriate      Musculoskeletal  Muscle Strength/Tone:  no tremor   Gait & Station:  non-ataxic     Psychiatric  Speech:  no latency; no press   Mood & Affect:  steady  congruent and appropriate   Thought Process:  normal and logical   Associations:  intact   Thought Content:  normal, no suicidality, no homicidality, delusions, or paranoia   Insight:  intact   Judgement: behavior is adequate to circumstances   Orientation:  grossly intact   Memory: intact for content of interview   Language: grossly intact   Attention Span & Concentration:  able to focus   Fund of Knowledge:  intact and appropriate to age and level of education     Assessment and Diagnosis   Status/Progress: Based on the examination today, the patient's problem(s) is/are improved.  New problems have not been presented today.   Co-morbidities and Lack of compliance are not complicating management of the primary condition.  There are no active rule-out diagnoses for this patient at this time.     General Impression:       ICD-10-CM ICD-9-CM   1. ADHD (attention deficit hyperactivity disorder), combined type  F90.2 314.01   2. Generalized anxiety disorder with panic attacks  F41.1 300.02    F41.0 300.01       Intervention/Counseling/Treatment Plan   Medication Management: The risks and benefits of medication were discussed with the patient.  Continue Adderall 30 mg po BID  Continue Xanax 0.5 mg po daily PRN panic attacks    Return to Clinic: 3 months    Risks, benefits, side effects and alternative treatments discussed with patient. Patient agrees with the current plan as documented.  Encouraged Patient to keep future appointments.  Take medications as prescribed and abstain from substance abuse.  Pt to present to ED for thoughts to harm herself or others

## 2023-02-22 ENCOUNTER — CLINICAL SUPPORT (OUTPATIENT)
Dept: REHABILITATION | Facility: HOSPITAL | Age: 38
End: 2023-02-22
Attending: ORTHOPAEDIC SURGERY
Payer: MEDICAID

## 2023-02-22 DIAGNOSIS — M62.81 QUADRICEPS WEAKNESS: ICD-10-CM

## 2023-02-22 DIAGNOSIS — Z74.09 DECREASED FUNCTIONAL MOBILITY AND ENDURANCE: Primary | ICD-10-CM

## 2023-02-22 PROCEDURE — 97110 THERAPEUTIC EXERCISES: CPT | Mod: PN | Performed by: PHYSICAL THERAPIST

## 2023-02-22 NOTE — PROGRESS NOTES
"[facility]   Physical Therapy Treatment Note     Name: Terri Summers  Clinic Number: [id number]    Therapy Diagnosis:   Encounter Diagnoses   Name Primary?    Decreased functional mobility and endurance Yes    Quadriceps weakness      Physician: Shan Crystal MD    Visit Date: 2/22/2023    Physician Orders: PT Eval and Treat      Medical Diagnosis from Referral:   S83.004D (ICD-10-CM) - Traumatic dislocation of patella, right, subsequent encounter   S83.511A (ICD-10-CM) - Chronic rupture of ACL of right knee      Evaluation Date: 11/22/2022  Plan of Care Expiration: 4/7/23   Progress Note Due: 4/7/23  Visit # / Visits authorized: 13/20 (+6 from 2022),   FOTO: 1/27/2023  PTA Visit: 0/5    Precautions: standard, post-op ACL with quad tendon autograph protocol; NO LOADED FLEXION PAST 90 x 4 months; May unlock hinged knee brace and WBAT.    *PRECAUTION ? Graft at weakest point during this period.  No impact loading activities such as  jumping, running, pivoting, or cutting    Time In: 8:08 AM  Time Out: 09:02 AM  Total Billable Time: 54 minutes (TE x4 )    SUBJECTIVE     Pt reports:  Friday-Sunday her knee was a 6/10 at night, but more painful throughout the days. She was walking around the house more.    She was compliant with her home exercise program.    Response to previous treatment: no problems.  Functional change: ambulating with 1 crutch around home    Pain: 3/10  Location: right knee      OBJECTIVE     See re-assessment on 12/16/22    Date of surgery: 12/15/22 by Dr. Crystal  Procedure:  Right ACL reconstruction with quadriceps autograft   Right all-inside lateral meniscus repair  Right medial quadriceps tendon reconstruction with semitendinosis allograft     2/6/23:  Right quad: 17.1 kg  Right knee AROM: 125 degree   Right knee PROM: 134 degree   30" sit <> stand: 9x (mild left weight shift)  Single leg stance: > 10 seconds without upper extremity   Step ups: Poor hamstring " "eccentric control on ascent and poor quad eccentric control on descent    Treatment     Terri received the treatments listed below      therapeutic exercises to develop strength, endurance, ROM, flexibility, posture, and core stabilization for minutes 65 including:    Blood flow resistance trainin% LOP (UOP: 210; 60% = 126 mmHg) with following exercises performing 30, 15, 15, 15 with 30 second rest breaks between sets   SLR flexion    Long arc quad: right    Right single leg Bridges 3x10 right  Mini squats 4x10 (no cues needed today)    Step ups: 4" step: 1x15 (normal); 1x15 (slight contralateral march)  Right heel raise: 2x10  Backwards walkin ft x 3 GCB resist  7 hurdles: small yellow 3 laps step-to; 3 laps reciprocal; 3 laps side stepping; 3 laps backwards  Right leg press: seat 4, 4 plates 3x15  Right eccentric hamstring curl machine (down 2, up 1): 2x10  Lateral step downs: 4" step (about 2" performance not to floor) 2x5      Not today  NUSTEP: 5 minutes level 4 for improved cardiovascular endurance  Lateral walks: yellow theraband in // bars 3 laps  Single leg stance: 10x10" holds (NO upper extremity use)  Fitter First Balance Board:Static Balance (A/P) (S/S) 2x1' ea  BOSU stance (round side down): 2x1  minutes    Patient Education and Home Exercises     Home Exercises Provided and Patient Education Provided     Education provided:   - use of and fitting of hinge brace  - benefits of NMES and TENS - units to purchase  - blood flow restriction therapy  - sitting with brace unlocked for a little at home    Written Home Exercises Provided: yes. Exercises were reviewed and Terri was able to demonstrate them prior to the end of the session.  Terri demonstrated good  understanding of the education provided. See EMR under Patient Instructions for exercises provided during therapy sessions    ASSESSMENT     Terri arrives status post ACL repair with quad tendon autograph and MQTFL reconstruction with " "semitendinosis allograph. Main complaint of superior/medial knee pain during gait that appears to be associated with correct patellar tracking post op. She tends to occasionally circumduct with dec'd stance time on right to compensate. Also, significant right trendelenberg. Only able to perform lateral step down about 2" due to lateral hip weakness.    Pt prognosis is Good.     Pt will continue to benefit from skilled outpatient physical therapy to address the deficits listed in the problem list box on initial evaluation, provide pt/family education and to maximize pt's level of independence in the home and community environment.     Pt's spiritual, cultural and educational needs considered and pt agreeable to plan of care and goals.     Anticipated barriers to physical therapy: chronic nature of symptoms    Goals:   Short Term Goals: 6 weeks   1.  Patient will demonstrate right knee ROM at least 5-0-120 degree. MET  2.  Patient will demonstrate right quad strength by performing 3x10 of active straight leg raise without lag.  MET  3.  Patient will report worst right knee pain < 4/10 to improve ADL performance. MET     Long Term Goals: 16 weeks   1.  Patient will demonstrate ability to perform > 12 sit <> stand transfers w/o UE use for improved ability to stand from low surfaces. PROGRESSING; NOT MET  2.  Patient will demonstrate right quad strength via MicroFET of > 27 kg to improve transfers, gait and ADL performance. PROGRESSING; NOT MET  3.  Patient will demonstrate to PT comprehensive understanding of each HEP component without verbal cueing. PROGRESSING; NOT MET  4.  Patient will improve FOTO to < 43% to improve ADL performance. PROGRESSING; NOT MET  5.  Patient will demonstrate ability to negotiate a flight of stairs with reciprocal pattern, no handrail and no compensation or symptoms. PROGRESSING; NOT MET    PLAN     Cont with POC per ACL quad tendon protocol  Currently phase IV (squats/leg press 0-60 degree, " single leg stance/stability exercises)  BFR PRN    Shan Dolan, PT

## 2023-02-24 ENCOUNTER — PATIENT MESSAGE (OUTPATIENT)
Dept: PSYCHIATRY | Facility: CLINIC | Age: 38
End: 2023-02-24
Payer: MEDICAID

## 2023-02-24 DIAGNOSIS — F90.2 ADHD (ATTENTION DEFICIT HYPERACTIVITY DISORDER), COMBINED TYPE: ICD-10-CM

## 2023-02-24 RX ORDER — DEXTROAMPHETAMINE SACCHARATE, AMPHETAMINE ASPARTATE, DEXTROAMPHETAMINE SULFATE AND AMPHETAMINE SULFATE 7.5; 7.5; 7.5; 7.5 MG/1; MG/1; MG/1; MG/1
30 TABLET ORAL 2 TIMES DAILY
Qty: 60 TABLET | Refills: 0 | Status: SHIPPED | OUTPATIENT
Start: 2023-04-22 | End: 2023-05-15 | Stop reason: SDUPTHER

## 2023-02-24 RX ORDER — DEXTROAMPHETAMINE SACCHARATE, AMPHETAMINE ASPARTATE, DEXTROAMPHETAMINE SULFATE AND AMPHETAMINE SULFATE 7.5; 7.5; 7.5; 7.5 MG/1; MG/1; MG/1; MG/1
30 TABLET ORAL 2 TIMES DAILY
Qty: 60 TABLET | Refills: 0 | Status: SHIPPED | OUTPATIENT
Start: 2023-03-23 | End: 2023-05-15 | Stop reason: SDUPTHER

## 2023-02-24 RX ORDER — DEXTROAMPHETAMINE SACCHARATE, AMPHETAMINE ASPARTATE, DEXTROAMPHETAMINE SULFATE AND AMPHETAMINE SULFATE 7.5; 7.5; 7.5; 7.5 MG/1; MG/1; MG/1; MG/1
30 TABLET ORAL 2 TIMES DAILY
Qty: 60 TABLET | Refills: 0 | Status: SHIPPED | OUTPATIENT
Start: 2023-02-24 | End: 2023-05-15 | Stop reason: SDUPTHER

## 2023-02-27 ENCOUNTER — CLINICAL SUPPORT (OUTPATIENT)
Dept: REHABILITATION | Facility: HOSPITAL | Age: 38
End: 2023-02-27
Attending: ORTHOPAEDIC SURGERY
Payer: MEDICAID

## 2023-02-27 DIAGNOSIS — M62.81 QUADRICEPS WEAKNESS: ICD-10-CM

## 2023-02-27 DIAGNOSIS — Z74.09 DECREASED FUNCTIONAL MOBILITY AND ENDURANCE: Primary | ICD-10-CM

## 2023-02-27 PROCEDURE — 97110 THERAPEUTIC EXERCISES: CPT | Mod: PN | Performed by: PHYSICAL THERAPIST

## 2023-02-27 NOTE — PROGRESS NOTES
"[facility]   Physical Therapy Treatment Note     Name: Terri Summers  Clinic Number: [id number]    Therapy Diagnosis:   Encounter Diagnoses   Name Primary?    Decreased functional mobility and endurance Yes    Quadriceps weakness      Physician: Shan Crystal MD    Visit Date: 2/27/2023    Physician Orders: PT Eval and Treat      Medical Diagnosis from Referral:   S83.004D (ICD-10-CM) - Traumatic dislocation of patella, right, subsequent encounter   S83.511A (ICD-10-CM) - Chronic rupture of ACL of right knee      Evaluation Date: 11/22/2022  Plan of Care Expiration: 4/7/23   Progress Note Due: 4/7/23  Visit # / Visits authorized: 14/20 (+6 from 2022),   FOTO: 1/27/2023  PTA Visit: 0/5    Precautions: standard, post-op ACL with quad tendon autograph protocol; NO LOADED FLEXION PAST 90 x 4 months; May unlock hinged knee brace and WBAT.    *PRECAUTION ? Graft at weakest point during this period.  No impact loading activities such as  jumping, running, pivoting, or cutting    Time In: 8:08 AM  Time Out: 09:02 AM  Total Billable Time: 54 minutes (TE x4 )    SUBJECTIVE     Pt reports:  She felt better this weekend. At work, she's elevating her leg higher, which she thinks has helped. She feels better when she's able to move more.    She was compliant with her home exercise program.    Response to previous treatment: no problems.  Functional change: ambulating with 1 crutch around home    Pain: 2/10  Location: right knee      OBJECTIVE     See re-assessment on 12/16/22    Date of surgery: 12/15/22 by Dr. Crystal  Procedure:  Right ACL reconstruction with quadriceps autograft   Right all-inside lateral meniscus repair  Right medial quadriceps tendon reconstruction with semitendinosis allograft     2/27/23:  Right quad: 23.8 kg  30" sit <> stand: 10x (mild left weight shift)  Single leg stance: 1 minutes without upper extremity       Treatment     Terri received the treatments listed below  " "    therapeutic exercises to develop strength, endurance, ROM, flexibility, posture, and core stabilization for minutes 65 including:    Blood flow resistance trainin% LOP (UOP: 210; 65% = 136 mmHg) with following exercises performing 30, 15, 15, 15 with 30 second rest breaks between sets   SLR flexion    Long arc quad: right    Right single leg Bridges 3x10 right  Mini squats 4x10 (no cues needed today)    Step ups: 4" step:  3x10 (slight contralateral march)  Right heel raise: 2x10  Right leg press: seat 4, 5 plates 3x10  Right eccentric hamstring curl machine (down 2, up 1): 3x10 4 plates  Lateral step downs: 4" step 2x7  Single leg stance with trampoline ball toss: 1 minutes   C sweeps: 2x10 right   Split squats: 2x8 each  BOSU stepovers (round side up): 10x each      Not today  NUSTEP: 5 minutes level 4 for improved cardiovascular endurance  7 hurdles: small yellow 3 laps step-to; 3 laps reciprocal; 3 laps side stepping; 3 laps backwards  Lateral walks: yellow theraband in // bars 3 laps  F[employer] First Balance Board:Static Balance (A/P) (S/S) 2x1' ea  BOSU stance (round side down): 2x1  minutes    Patient Education and Home Exercises     Home Exercises Provided and Patient Education Provided     Education provided:   - use of and fitting of hinge brace  - benefits of NMES and TENS - units to purchase  - blood flow restriction therapy  - sitting with brace unlocked for a little at home    Written Home Exercises Provided: yes. Exercises were reviewed and Terri was able to demonstrate them prior to the end of the session.  Terri demonstrated good  understanding of the education provided. See EMR under Patient Instructions for exercises provided during therapy sessions    ASSESSMENT     Terri arrives status post ACL repair with quad tendon autograph and MQTFL reconstruction with semitendinosis allograph. Quad strength improving per microFET testing. Still decreased stance on right lower extremity during normal " gait, but single leg stance improved to 1 minute.     Pt prognosis is Good.     Pt will continue to benefit from skilled outpatient physical therapy to address the deficits listed in the problem list box on initial evaluation, provide pt/family education and to maximize pt's level of independence in the home and community environment.     Pt's spiritual, cultural and educational needs considered and pt agreeable to plan of care and goals.     Anticipated barriers to physical therapy: chronic nature of symptoms    Goals:   Short Term Goals: 6 weeks   1.  Patient will demonstrate right knee ROM at least 5-0-120 degree. MET  2.  Patient will demonstrate right quad strength by performing 3x10 of active straight leg raise without lag.  MET  3.  Patient will report worst right knee pain < 4/10 to improve ADL performance. MET     Long Term Goals: 16 weeks   1.  Patient will demonstrate ability to perform > 12 sit <> stand transfers w/o UE use for improved ability to stand from low surfaces. PROGRESSING; NOT MET  2.  Patient will demonstrate right quad strength via MicroFET of > 27 kg to improve transfers, gait and ADL performance. PROGRESSING; NOT MET  3.  Patient will demonstrate to PT comprehensive understanding of each HEP component without verbal cueing. PROGRESSING; NOT MET  4.  Patient will improve FOTO to < 43% to improve ADL performance. PROGRESSING; NOT MET  5.  Patient will demonstrate ability to negotiate a flight of stairs with reciprocal pattern, no handrail and no compensation or symptoms. PROGRESSING; NOT MET    PLAN     Cont with POC per ACL quad tendon protocol  Currently phase IV (squats/leg press 0-60 degree, single leg stance/stability exercises)  BFR PRSTEVEN Dolan, PT

## 2023-03-01 ENCOUNTER — PATIENT MESSAGE (OUTPATIENT)
Dept: REHABILITATION | Facility: HOSPITAL | Age: 38
End: 2023-03-01

## 2023-03-06 ENCOUNTER — CLINICAL SUPPORT (OUTPATIENT)
Dept: REHABILITATION | Facility: HOSPITAL | Age: 38
End: 2023-03-06
Payer: MEDICAID

## 2023-03-06 DIAGNOSIS — Z74.09 DECREASED FUNCTIONAL MOBILITY AND ENDURANCE: Primary | ICD-10-CM

## 2023-03-06 DIAGNOSIS — M62.81 QUADRICEPS WEAKNESS: ICD-10-CM

## 2023-03-06 PROCEDURE — 97110 THERAPEUTIC EXERCISES: CPT | Mod: PN | Performed by: PHYSICAL THERAPIST

## 2023-03-06 NOTE — PROGRESS NOTES
"[facility]   Physical Therapy Treatment Note     Name: Terri Summers  Clinic Number: [id number]    Therapy Diagnosis:   Encounter Diagnoses   Name Primary?    Decreased functional mobility and endurance Yes    Quadriceps weakness      Physician: Shan Crystal MD    Visit Date: 3/6/2023    Physician Orders: PT Eval and Treat      Medical Diagnosis from Referral:   S83.004D (ICD-10-CM) - Traumatic dislocation of patella, right, subsequent encounter   S83.511A (ICD-10-CM) - Chronic rupture of ACL of right knee      Evaluation Date: 11/22/2022  Plan of Care Expiration: 4/7/23   Progress Note Due: 4/7/23  Visit # / Visits authorized: 15/20 (+6 from 2022),   FOTO: 1/27/2023  PTA Visit: 0/5    Precautions: standard, post-op ACL with quad tendon autograph protocol; NO LOADED FLEXION PAST 90 x 4 months; May unlock hinged knee brace and WBAT.    *PRECAUTION ? Graft at weakest point during this period.  No impact loading activities such as  jumping, running, pivoting, or cutting    Time In: 9:00 AM  Time Out: 9:55 AM  Total Billable Time: 54 minutes (TE x4 )    SUBJECTIVE     Pt reports: no new changes over the weekend. She had to miss last appointment due to work    She was compliant with her home exercise program.    Response to previous treatment: no problems.  Functional change: ambulating with 1 crutch around home    Pain: 3/10  Location: right knee      OBJECTIVE     See re-assessment on 12/16/22    Date of surgery: 12/15/22 by Dr. Crystal  Procedure:  Right ACL reconstruction with quadriceps autograft   Right all-inside lateral meniscus repair  Right medial quadriceps tendon reconstruction with semitendinosis allograft     2/27/23:  Right quad: 23.8 kg  30" sit <> stand: 10x (mild left weight shift)  Single leg stance: 1 minutes without upper extremity       Treatment     Terri received the treatments listed below      therapeutic exercises to develop strength, endurance, ROM, " "flexibility, posture, and core stabilization for minutes 55 including:    Blood flow resistance trainin% LOP (UOP: 150; 65% = 98 mmHg) with following exercises performing 30, 15, 15, 15 with 30 second rest breaks between sets (medium cuff)  SLR flexion    Long arc quad: right    Right single leg Bridges 2x15 right  Mini squats 4x10 (no cues needed today)    Step ups: 4" step:  3x10 (slight contralateral march)  Right heel raise: 2x10  Right leg press: seat 4, 5 plates 3x10  Right eccentric hamstring curl machine (down 2, up 1): 3x10 4 plates  Lateral step downs: 4" step 2x7  Toe tap behind, heel tap in front: 20x each (simulating taking a step)  Single leg stance with trampoline ball toss: 1 minutes   High knee marches on foam: 1 minutes   C sweeps: 2x10 right   Split squats: 2x8 each  BOSU stepovers (round side up): 20x each      Not today  NUSTEP: 5 minutes level 4 for improved cardiovascular endurance  7 hurdles: small yellow 3 laps step-to; 3 laps reciprocal; 3 laps side stepping; 3 laps backwards  Lateral walks: yellow theraband in // bars 3 laps  F[employer] Balance Board:Static Balance (A/P) (S/S) 2x1' ea  BOSU stance (round side down): 2x1  minutes    Patient Education and Home Exercises     Home Exercises Provided and Patient Education Provided     Education provided:   - use of and fitting of hinge brace  - benefits of NMES and TENS - units to purchase  - blood flow restriction therapy  - sitting with brace unlocked for a little at home    Written Home Exercises Provided: yes. Exercises were reviewed and Terri was able to demonstrate them prior to the end of the session.  Terri demonstrated good  understanding of the education provided. See EMR under Patient Instructions for exercises provided during therapy sessions    ASSESSMENT     Terri arrives status post ACL repair with quad tendon autograph and MQTFL reconstruction with semitendinosis allograph. Still limp present with decreased stance on " "right. No trendelenberg with single leg stance, but present during gait. Less trendelenberg with lateral step down from 4" step, but immediate tactile cue needed.     Pt prognosis is Good.     Pt will continue to benefit from skilled outpatient physical therapy to address the deficits listed in the problem list box on initial evaluation, provide pt/family education and to maximize pt's level of independence in the home and community environment.     Pt's spiritual, cultural and educational needs considered and pt agreeable to plan of care and goals.     Anticipated barriers to physical therapy: chronic nature of symptoms    Goals:   Short Term Goals: 6 weeks   1.  Patient will demonstrate right knee ROM at least 5-0-120 degree. MET  2.  Patient will demonstrate right quad strength by performing 3x10 of active straight leg raise without lag.  MET  3.  Patient will report worst right knee pain < 4/10 to improve ADL performance. MET     Long Term Goals: 16 weeks   1.  Patient will demonstrate ability to perform > 12 sit <> stand transfers w/o UE use for improved ability to stand from low surfaces. PROGRESSING; NOT MET  2.  Patient will demonstrate right quad strength via MicroFET of > 27 kg to improve transfers, gait and ADL performance. PROGRESSING; NOT MET  3.  Patient will demonstrate to PT comprehensive understanding of each HEP component without verbal cueing. PROGRESSING; NOT MET  4.  Patient will improve FOTO to < 43% to improve ADL performance. PROGRESSING; NOT MET  5.  Patient will demonstrate ability to negotiate a flight of stairs with reciprocal pattern, no handrail and no compensation or symptoms. PROGRESSING; NOT MET    PLAN     Cont with POC per ACL quad tendon protocol  Currently phase IV (squats/leg press 0-60 degree, single leg stance/stability exercises)  BFR PRN    Shan Dolan, PT                          "

## 2023-03-09 ENCOUNTER — PATIENT MESSAGE (OUTPATIENT)
Dept: ORTHOPEDICS | Facility: CLINIC | Age: 38
End: 2023-03-09
Payer: MEDICAID

## 2023-03-13 ENCOUNTER — CLINICAL SUPPORT (OUTPATIENT)
Dept: REHABILITATION | Facility: HOSPITAL | Age: 38
End: 2023-03-13
Attending: ORTHOPAEDIC SURGERY
Payer: MEDICAID

## 2023-03-13 DIAGNOSIS — Z74.09 DECREASED FUNCTIONAL MOBILITY AND ENDURANCE: Primary | ICD-10-CM

## 2023-03-13 DIAGNOSIS — M62.81 QUADRICEPS WEAKNESS: ICD-10-CM

## 2023-03-13 PROCEDURE — 97110 THERAPEUTIC EXERCISES: CPT | Mod: PN | Performed by: PHYSICAL THERAPIST

## 2023-03-13 NOTE — PROGRESS NOTES
"[facility]   Physical Therapy Treatment Note     Name: Terri Summers  Clinic Number: [id number]    Therapy Diagnosis:   Encounter Diagnoses   Name Primary?    Decreased functional mobility and endurance Yes    Quadriceps weakness        Physician: Shan Crystal MD    Visit Date: 3/13/2023    Physician Orders: PT Eval and Treat      Medical Diagnosis from Referral:   S83.004D (ICD-10-CM) - Traumatic dislocation of patella, right, subsequent encounter   S83.511A (ICD-10-CM) - Chronic rupture of ACL of right knee      Evaluation Date: 11/22/2022  Plan of Care Expiration: 4/7/23   Progress Note Due: 4/7/23  Visit # / Visits authorized: 16/20 (+6 from 2022),   FOTO: 1/27/2023  PTA Visit: 0/5    Precautions: standard, post-op ACL with quad tendon autograph protocol; NO LOADED FLEXION PAST 90 x 4 months; May unlock hinged knee brace and WBAT.    *PRECAUTION ? Graft at weakest point during this period.  No impact loading activities such as  jumping, running, pivoting, or cutting    Time In: 8:49 AM  Time Out: 9:42 AM  Total Billable Time: 53 minutes (TE x4 )    SUBJECTIVE     Pt reports: increased pain most likely due to increase in swelling last week. She called to make an appointment to get it drained this week, but couldn't get in until next week. Swelling has gone down, though.    She was compliant with her home exercise program.    Response to previous treatment: no problems.  Functional change: ambulating with 1 crutch around home    Pain: 3/10  Location: right knee      OBJECTIVE     See re-assessment on 12/16/22    Date of surgery: 12/15/22 by Dr. Crystal  Procedure:  Right ACL reconstruction with quadriceps autograft   Right all-inside lateral meniscus repair  Right medial quadriceps tendon reconstruction with semitendinosis allograft     2/27/23:  Right quad: 23.8 kg  30" sit <> stand: 10x (mild left weight shift)  Single leg stance: 1 minutes without upper extremity       Treatment " "    Terri received the treatments listed below      therapeutic exercises to develop strength, endurance, ROM, flexibility, posture, and core stabilization for minutes 53 including:    NUSTEP: 6 minutes level 4 for improved cardiovascular endurance    Right single leg Bridges 2x15 right  Long arc quad: 3 pounds 30x  Mini squats 4x10 (no cues needed today)    4 medium hurdles: small yellow 3 laps step-to; 3 laps reciprocal; 3 laps side stepping; 3 laps backwards  Step ups: 6" step:  3x10 (slight contralateral march)  Right heel raise: 2x10  Right leg press: seat 4, 5 plates 3x10  Right eccentric hamstring curl machine (down 2, up 1): 3x10 4 plates  Lateral step downs: 4" step 2x8  Toe tap behind, heel tap in front: 20x each (simulating taking a step)  Single leg stance with trampoline ball toss: 1 minutes   High knee marches on foam: 1 minutes   C sweeps: 2x10 right   Split squats: 2x10 each  BOSU stepovers (round side up): 20x each  Lateral step to pause on BOSU (round side up): 20x each      Not today  Blood flow resistance trainin% LOP (UOP: 150; 65% = 98 mmHg) with following exercises performing 30, 15, 15, 15 with 30 second rest breaks between sets (medium cuff)  SLR flexion    Long arc quad: right  Squats  (NOT PERFORMED due to patient wearing pants instead of shorts)      Lateral walks: yellow theraband in // bars 3 laps  [employer] Balance Board:Static Balance (A/P) (S/S) 2x1' ea  BOSU stance (round side down): 2x1  minutes    Patient Education and Home Exercises     Home Exercises Provided and Patient Education Provided     Education provided:   - use of and fitting of hinge brace  - benefits of NMES and TENS - units to purchase  - blood flow restriction therapy  - sitting with brace unlocked for a little at home    Written Home Exercises Provided: yes. Exercises were reviewed and Terri was able to demonstrate them prior to the end of the session.  Terri demonstrated good  understanding of the " education provided. See EMR under Patient Instructions for exercises provided during therapy sessions    ASSESSMENT     Terri arrives status post ACL repair with quad tendon autograph and MQTFL reconstruction with semitendinosis allograph. Unable to perform BFR due to patient wearing long pants today. Improving weight bearing on right seen by better stability with single leg stance exercises today. Still mild limp present with gait, though.    Pt prognosis is Good.     Pt will continue to benefit from skilled outpatient physical therapy to address the deficits listed in the problem list box on initial evaluation, provide pt/family education and to maximize pt's level of independence in the home and community environment.     Pt's spiritual, cultural and educational needs considered and pt agreeable to plan of care and goals.     Anticipated barriers to physical therapy: chronic nature of symptoms    Goals:   Short Term Goals: 6 weeks   1.  Patient will demonstrate right knee ROM at least 5-0-120 degree. MET  2.  Patient will demonstrate right quad strength by performing 3x10 of active straight leg raise without lag.  MET  3.  Patient will report worst right knee pain < 4/10 to improve ADL performance. MET     Long Term Goals: 16 weeks   1.  Patient will demonstrate ability to perform > 12 sit <> stand transfers w/o UE use for improved ability to stand from low surfaces. PROGRESSING; NOT MET  2.  Patient will demonstrate right quad strength via MicroFET of > 27 kg to improve transfers, gait and ADL performance. PROGRESSING; NOT MET  3.  Patient will demonstrate to PT comprehensive understanding of each HEP component without verbal cueing. PROGRESSING; NOT MET  4.  Patient will improve FOTO to < 43% to improve ADL performance. PROGRESSING; NOT MET  5.  Patient will demonstrate ability to negotiate a flight of stairs with reciprocal pattern, no handrail and no compensation or symptoms. PROGRESSING; NOT MET    PLAN      Cont with POC per ACL quad tendon protocol  Currently phase IV (squats/leg press 0-60 degree, single leg stance/stability exercises)  BFR PRN    Shan Dolan, PT

## 2023-03-15 ENCOUNTER — CLINICAL SUPPORT (OUTPATIENT)
Dept: REHABILITATION | Facility: HOSPITAL | Age: 38
End: 2023-03-15
Attending: ORTHOPAEDIC SURGERY
Payer: MEDICAID

## 2023-03-15 DIAGNOSIS — Z74.09 DECREASED FUNCTIONAL MOBILITY AND ENDURANCE: Primary | ICD-10-CM

## 2023-03-15 DIAGNOSIS — M62.81 QUADRICEPS WEAKNESS: ICD-10-CM

## 2023-03-15 PROCEDURE — 97110 THERAPEUTIC EXERCISES: CPT | Mod: PN

## 2023-03-15 NOTE — PROGRESS NOTES
"[facility]   Physical Therapy Treatment Note     Name: Terri Summers  Clinic Number: [id number]    Therapy Diagnosis:   Encounter Diagnoses   Name Primary?    Decreased functional mobility and endurance Yes    Quadriceps weakness      Physician: Shan Crystal MD    Visit Date: 3/15/2023    Physician Orders: PT Eval and Treat      Medical Diagnosis from Referral:   S83.004D (ICD-10-CM) - Traumatic dislocation of patella, right, subsequent encounter   S83.511A (ICD-10-CM) - Chronic rupture of ACL of right knee      Evaluation Date: 11/22/2022  Plan of Care Expiration: 4/7/23   Progress Note Due: 4/7/23  Visit # / Visits authorized: 16/20 (+6 from 2022),   FOTO: 1/27/2023  PTA Visit: 0/5    Precautions: standard, post-op ACL with quad tendon autograph protocol; NO LOADED FLEXION PAST 90 x 4 months; May unlock hinged knee brace and WBAT.    *PRECAUTION ? Graft at weakest point during this period.  No impact loading activities such as  jumping, running, pivoting, or cutting    Time In: 8:05 AM  Time Out: 9:00 AM  Total Billable Time: 55 minutes (TE x4 )    SUBJECTIVE     Pt reports: not able to see MD yet about swelling, but pain is ok. Mild pain at anterior knee area.    She was compliant with her home exercise program.    Response to previous treatment: no problems.  Functional change: ambulating with 1 crutch around home    Pain: 3/10  Location: right knee      OBJECTIVE     See re-assessment on 12/16/22    Date of surgery: 12/15/22 by Dr. Crystal  Procedure:  Right ACL reconstruction with quadriceps autograft   Right all-inside lateral meniscus repair  Right medial quadriceps tendon reconstruction with semitendinosis allograft     2/27/23:  Right quad: 23.8 kg  30" sit <> stand: 10x (mild left weight shift)  Single leg stance: 1 minutes without upper extremity     Treatment     Terri received the treatments listed below      therapeutic exercises to develop strength, endurance, ROM, " "flexibility, posture, and core stabilization for minutes 55 including:    NUSTEP: 6 minutes level 4 for improved cardiovascular endurance    Right single leg Bridges 2x15 right  Long arc quad: 4 pounds: 15x10'' holds    4 medium hurdles: small yellow 3 laps step-to; 3 laps reciprocal; 3 laps side stepping; 3 laps backwards  Step ups: 6" step:  3x10 (slight contralateral march)  Right heel raise: 2x10  Right leg press: seat 4, 5 plates 2x20  Right eccentric hamstring curl machine (down 2, up 1): 3x10 4 plates  Lateral step downs: 4" step 3x10  Forward step downs: 2x10 right, 4 inch step  Toe tap behind, heel tap in front: 20x each (simulating taking a step)    Lateral boundinx Bilateral (small range)  Forward boundinx right (small range)    Not today:  Blood flow resistance trainin% LOP (UOP: 150; 65% = 98 mmHg) with following exercises performing 30, 15, 15, 15 with 30 second rest breaks between sets (medium cuff)  SLR flexion    Long arc quad  Squats    Not today:  Single leg stance with trampoline ball toss: 1 minutes   High knee marches on foam: 1 minutes   C sweeps: 2x10 right, green theraband foam   Split squats: 3x8 right  BOSU stepovers (round side up): 20x each  Lateral step to pause on BOSU (round side up): 20x each  Lateral walks: yellow theraband in // bars 3 laps  [employer] Balance Board:Static Balance (A/P) (S/S) 2x1' ea  BOSU stance (round side down): 2x1  minutes    Patient Education and Home Exercises     Home Exercises Provided and Patient Education Provided   Education provided:   - use of and fitting of hinge brace  - benefits of NMES and TENS - units to purchase  - blood flow restriction therapy  - sitting with brace unlocked for a little at home    Written Home Exercises Provided: yes. Exercises were reviewed and Terri was able to demonstrate them prior to the end of the session.  Terri demonstrated good  understanding of the education provided. See EMR under Patient " Instructions for exercises provided during therapy sessions    ASSESSMENT     Terri arrives status post ACL repair with quad tendon autograph and MQTFL reconstruction with semitendinosis allograph. Will perform BFR nexr visit. Initiated gentle bounding today, moderate amount of anxiety, and mild instability still seen. pt reports that it does feel like her left knee may give out sometimes of the day. Mild swelling present and caution with plyometric and single leg activities. Gentle loading program.     Pt prognosis is Good.     Pt will continue to benefit from skilled outpatient physical therapy to address the deficits listed in the problem list box on initial evaluation, provide pt/family education and to maximize pt's level of independence in the home and community environment.     Pt's spiritual, cultural and educational needs considered and pt agreeable to plan of care and goals.     Anticipated barriers to physical therapy: chronic nature of symptoms    Goals:   Short Term Goals: 6 weeks   1.  Patient will demonstrate right knee ROM at least 5-0-120 degree. MET  2.  Patient will demonstrate right quad strength by performing 3x10 of active straight leg raise without lag.  MET  3.  Patient will report worst right knee pain < 4/10 to improve ADL performance. MET     Long Term Goals: 16 weeks   1.  Patient will demonstrate ability to perform > 12 sit <> stand transfers w/o UE use for improved ability to stand from low surfaces. PROGRESSING; NOT MET  2.  Patient will demonstrate right quad strength via MicroFET of > 27 kg to improve transfers, gait and ADL performance. PROGRESSING; NOT MET  3.  Patient will demonstrate to PT comprehensive understanding of each HEP component without verbal cueing. PROGRESSING; NOT MET  4.  Patient will improve FOTO to < 43% to improve ADL performance. PROGRESSING; NOT MET  5.  Patient will demonstrate ability to negotiate a flight of stairs with reciprocal pattern, no handrail and  no compensation or symptoms. PROGRESSING; NOT MET    PLAN     Cont with POC per ACL quad tendon protocol  Currently phase IV (squats/leg press 0-60 degree, single leg stance/stability exercises)  BFR PRN    Darius Moore, PT

## 2023-03-20 ENCOUNTER — CLINICAL SUPPORT (OUTPATIENT)
Dept: REHABILITATION | Facility: HOSPITAL | Age: 38
End: 2023-03-20
Attending: ORTHOPAEDIC SURGERY
Payer: MEDICAID

## 2023-03-20 DIAGNOSIS — Z74.09 DECREASED FUNCTIONAL MOBILITY AND ENDURANCE: Primary | ICD-10-CM

## 2023-03-20 DIAGNOSIS — M62.81 QUADRICEPS WEAKNESS: ICD-10-CM

## 2023-03-20 PROCEDURE — 97110 THERAPEUTIC EXERCISES: CPT | Mod: PN

## 2023-03-20 NOTE — PROGRESS NOTES
"[facility]   Physical Therapy Treatment Note     Name: Terri Summers  Clinic Number: [id number]    Therapy Diagnosis:   Encounter Diagnoses   Name Primary?    Decreased functional mobility and endurance Yes    Quadriceps weakness      Physician: Shan Crystal MD    Visit Date: 3/20/2023    Physician Orders: PT Eval and Treat   Medical Diagnosis from Referral:   S83.004D (ICD-10-CM) - Traumatic dislocation of patella, right, subsequent encounter   S83.511A (ICD-10-CM) - Chronic rupture of ACL of right knee   Evaluation Date: 11/22/2022  Plan of Care Expiration: 4/7/23   Progress Note Due: 4/7/23  Visit # / Visits authorized: 18/20 (+6 from 2022),   FOTO: 1/27/2023 - Next  PTA Visit: 0/5    Precautions: standard, post-op ACL with quad tendon autograph protocol; NO LOADED FLEXION PAST 90 x 4 months; May unlock hinged knee brace and WBAT.    *PRECAUTION ? Graft at weakest point during this period.  No impact loading activities such as jumping, running, pivoting, or cutting    Time In: 8:05 AM  Time Out: 9:00 AM  Total Billable Time: 55 minutes (TEx4)    SUBJECTIVE     Pt reports: still feels like she needs to move her right leg at night so some disturbed sleep. Has some clicking in her right knee.    She was compliant with her home exercise program.    Response to previous treatment: no problems.  Functional change: ambulating with 1 crutch around home    Pain: 2-3/10  Location: right knee      OBJECTIVE     See re-assessment on 12/16/22    Date of surgery: 12/15/22 by Dr. Crystal  Procedure:  Right ACL reconstruction with quadriceps autograft   Right all-inside lateral meniscus repair  Right medial quadriceps tendon reconstruction with semitendinosis allograft     2/27/23:  Right quad: 23.8 kg  30" sit <> stand: 10x (mild left weight shift)  Single leg stance: 1 minutes without upper extremity     Treatment     Terri received the treatments listed below      therapeutic exercises to " "develop strength, endurance, ROM, flexibility, posture, and core stabilization for minutes 55 including:    Bike: 6 minutes level 2 for improved cardiovascular endurance    Blood flow resistance trainin% LOP (UOP: 150; 65% = 98 mmHg) with following exercises performing 30, 15, 15, 15 with 30 second rest breaks between sets (medium cuff)  SLR flexion    Long arc quad: right  Squats: 30x, and 2 rounds of 15x    Right single leg Bridges 2x15 right with 10# dumbbell hold  4 medium hurdles: small yellow 3 laps step-to; 3 laps reciprocal; 3 laps side stepping; 3 laps backwards  Step ups: 6" step:  3x10 (slight contralateral march)  Right heel raise: 2x10  Right leg press: seat 4, 5 plates 2x20  Lateral step downs: 4" step 2x8, right - pain present    Standing side step bounding (no jumping): 2x10 Bilateral     Not today  Right eccentric hamstring curl machine (down 2, up 1): 3x10 4 plates  Forward step downs: 2x10 right, 4 inch step   Toe tap behind, heel tap in front: 20x each (simulating taking a step)  Single leg stance with trampoline ball toss: 1 minutes   High knee marches on foam: 1 minutes   C sweeps: 2x10 right, green theraband foam   Split squats: 3x8 right  BOSU stepovers (round side up): 20x each  Lateral step to pause on BOSU (round side up): 20x each  Lateral walks: yellow theraband in // bars 3 laps  Fitter First Balance Board:Static Balance (A/P) (S/S) 2x1' ea  BOSU stance (round side down): 2x1  minutes    Patient Education and Home Exercises     Home Exercises Provided and Patient Education Provided   Education provided:   - use of and fitting of hinge brace  - benefits of NMES and TENS - units to purchase  - blood flow restriction therapy  - sitting with brace unlocked for a little at home    Written Home Exercises Provided: yes. Exercises were reviewed and Terri was able to demonstrate them prior to the end of the session.  Terri demonstrated good  understanding of the education provided. See EMR " under Patient Instructions for exercises provided during therapy sessions    ASSESSMENT     Terri arrives status post ACL repair with quad tendon autograph and MQTFL reconstruction with semitendinosis allograph. Unable to perform BFR due to patient wearing long pants today. Good tolerance and fatigue after addition of squats to blood flow restriction training. Add weight to Long arc quad next and progress closed chain activities. Avoiding plyometrics, pt denied any pain but reported weakness with single leg side stepping. Pt requires continued quad strengthening; precaution with current mild knee irritability level.     Pt prognosis is Good.     Pt will continue to benefit from skilled outpatient physical therapy to address the deficits listed in the problem list box on initial evaluation, provide pt/family education and to maximize pt's level of independence in the home and community environment.     Pt's spiritual, cultural and educational needs considered and pt agreeable to plan of care and goals.     Anticipated barriers to physical therapy: chronic nature of symptoms    Goals:   Short Term Goals: 6 weeks   1.  Patient will demonstrate right knee ROM at least 5-0-120 degree. MET  2.  Patient will demonstrate right quad strength by performing 3x10 of active straight leg raise without lag.  MET  3.  Patient will report worst right knee pain < 4/10 to improve ADL performance. MET     Long Term Goals: 16 weeks   1.  Patient will demonstrate ability to perform > 12 sit <> stand transfers w/o UE use for improved ability to stand from low surfaces. PROGRESSING; NOT MET  2.  Patient will demonstrate right quad strength via MicroFET of > 27 kg to improve transfers, gait and ADL performance. PROGRESSING; NOT MET  3.  Patient will demonstrate to PT comprehensive understanding of each HEP component without verbal cueing. PROGRESSING; NOT MET  4.  Patient will improve FOTO to < 43% to improve ADL performance. PROGRESSING;  NOT MET  5.  Patient will demonstrate ability to negotiate a flight of stairs with reciprocal pattern, no handrail and no compensation or symptoms. PROGRESSING; NOT MET    PLAN     Cont with POC per ACL quad tendon protocol  Currently phase IV (squats/leg press 0-60 degree, single leg stance/stability exercises)  BFR PRN    Darius Moore, PT

## 2023-03-21 ENCOUNTER — OFFICE VISIT (OUTPATIENT)
Dept: ORTHOPEDICS | Facility: CLINIC | Age: 38
End: 2023-03-21
Payer: MEDICAID

## 2023-03-21 VITALS
DIASTOLIC BLOOD PRESSURE: 107 MMHG | SYSTOLIC BLOOD PRESSURE: 154 MMHG | BODY MASS INDEX: 29.23 KG/M2 | HEIGHT: 66 IN | WEIGHT: 181.88 LBS | HEART RATE: 108 BPM

## 2023-03-21 DIAGNOSIS — Z98.890 S/P ACL RECONSTRUCTION: Primary | ICD-10-CM

## 2023-03-21 PROCEDURE — 3080F DIAST BP >= 90 MM HG: CPT | Mod: CPTII,,, | Performed by: ORTHOPAEDIC SURGERY

## 2023-03-21 PROCEDURE — 1159F PR MEDICATION LIST DOCUMENTED IN MEDICAL RECORD: ICD-10-PCS | Mod: CPTII,,, | Performed by: ORTHOPAEDIC SURGERY

## 2023-03-21 PROCEDURE — 1160F RVW MEDS BY RX/DR IN RCRD: CPT | Mod: CPTII,,, | Performed by: ORTHOPAEDIC SURGERY

## 2023-03-21 PROCEDURE — 1160F PR REVIEW ALL MEDS BY PRESCRIBER/CLIN PHARMACIST DOCUMENTED: ICD-10-PCS | Mod: CPTII,,, | Performed by: ORTHOPAEDIC SURGERY

## 2023-03-21 PROCEDURE — 3080F PR MOST RECENT DIASTOLIC BLOOD PRESSURE >= 90 MM HG: ICD-10-PCS | Mod: CPTII,,, | Performed by: ORTHOPAEDIC SURGERY

## 2023-03-21 PROCEDURE — 99213 OFFICE O/P EST LOW 20 MIN: CPT | Mod: PBBFAC,PN | Performed by: ORTHOPAEDIC SURGERY

## 2023-03-21 PROCEDURE — 3008F PR BODY MASS INDEX (BMI) DOCUMENTED: ICD-10-PCS | Mod: CPTII,,, | Performed by: ORTHOPAEDIC SURGERY

## 2023-03-21 PROCEDURE — 99213 PR OFFICE/OUTPT VISIT, EST, LEVL III, 20-29 MIN: ICD-10-PCS | Mod: S$PBB,,, | Performed by: ORTHOPAEDIC SURGERY

## 2023-03-21 PROCEDURE — 99999 PR PBB SHADOW E&M-EST. PATIENT-LVL III: ICD-10-PCS | Mod: PBBFAC,,, | Performed by: ORTHOPAEDIC SURGERY

## 2023-03-21 PROCEDURE — 99999 PR PBB SHADOW E&M-EST. PATIENT-LVL III: CPT | Mod: PBBFAC,,, | Performed by: ORTHOPAEDIC SURGERY

## 2023-03-21 PROCEDURE — 3077F PR MOST RECENT SYSTOLIC BLOOD PRESSURE >= 140 MM HG: ICD-10-PCS | Mod: CPTII,,, | Performed by: ORTHOPAEDIC SURGERY

## 2023-03-21 PROCEDURE — 3077F SYST BP >= 140 MM HG: CPT | Mod: CPTII,,, | Performed by: ORTHOPAEDIC SURGERY

## 2023-03-21 PROCEDURE — 1159F MED LIST DOCD IN RCRD: CPT | Mod: CPTII,,, | Performed by: ORTHOPAEDIC SURGERY

## 2023-03-21 PROCEDURE — 99213 OFFICE O/P EST LOW 20 MIN: CPT | Mod: S$PBB,,, | Performed by: ORTHOPAEDIC SURGERY

## 2023-03-21 PROCEDURE — 3008F BODY MASS INDEX DOCD: CPT | Mod: CPTII,,, | Performed by: ORTHOPAEDIC SURGERY

## 2023-03-22 ENCOUNTER — CLINICAL SUPPORT (OUTPATIENT)
Dept: REHABILITATION | Facility: HOSPITAL | Age: 38
End: 2023-03-22
Attending: ORTHOPAEDIC SURGERY
Payer: MEDICAID

## 2023-03-22 DIAGNOSIS — M62.81 QUADRICEPS WEAKNESS: ICD-10-CM

## 2023-03-22 DIAGNOSIS — Z74.09 DECREASED FUNCTIONAL MOBILITY AND ENDURANCE: Primary | ICD-10-CM

## 2023-03-22 PROCEDURE — 97110 THERAPEUTIC EXERCISES: CPT | Mod: PN

## 2023-03-22 NOTE — PROGRESS NOTES
"[facility]   Physical Therapy Treatment Note     Name: Terri Summers  Clinic Number: [id number]    Therapy Diagnosis:   Encounter Diagnoses   Name Primary?    Decreased functional mobility and endurance Yes    Quadriceps weakness      Physician: Shan Crystal MD    Visit Date: 3/22/2023  Physician Orders: PT Eval and Treat   Medical Diagnosis from Referral:   S83.004D (ICD-10-CM) - Traumatic dislocation of patella, right, subsequent encounter   S83.511A (ICD-10-CM) - Chronic rupture of ACL of right knee    Evaluation Date: 11/22/2022  Plan of Care Expiration: 4/7/23   Progress Note Due: 4/7/23  Visit # / Visits authorized: 19/20 (+6 from 2022),   FOTO: 1/27/2023  PTA Visit: 0/5    Precautions: standard, post-op ACL with quad tendon autograph protocol; NO LOADED FLEXION PAST 90 x 4 months; May unlock hinged knee brace and WBAT.    *PRECAUTION ? Graft at weakest point during this period.  No impact loading activities such as  jumping, running, pivoting, or cutting    Time In: 8:05 AM  Time Out: 9:00 AM  Total Billable Time: 55 minutes (TE x4)    SUBJECTIVE     Pt reports: right knee soreness, but no sharp pain overall. Usually keeps her leg propped up at work while sitting.     She was compliant with her home exercise program.    Response to previous treatment: no problems.  Functional change: ambulating with 1 crutch around home    Pain: 2-3/10  Location: right knee      OBJECTIVE     See re-assessment on 12/16/22    Date of surgery: 12/15/22 by Dr. Crystal  Procedure:  Right ACL reconstruction with quadriceps autograft   Right all-inside lateral meniscus repair  Right medial quadriceps tendon reconstruction with semitendinosis allograft     2/27/23:  Right quad: 23.8 kg  30" sit <> stand: 10x (mild left weight shift)  Single leg stance: 1 minutes without upper extremity     Treatment     Terri received the treatments listed below      therapeutic exercises to develop strength, " "endurance, ROM, flexibility, posture, and core stabilization for minutes 55 including:    Bike: 6 minutes level 2 for improved cardiovascular endurance    Blood flow resistance trainin% LOP (UOP: 150; 70% = 112 mmHg) with following exercises performing 30, 15, 15, 15 with 30 second rest breaks between sets (medium cuff)  SLR flexion    Long arc quad: right, 2#  Squats: 30x, and 2 rounds of 15x      Right single leg Bridges 2x15 right with 10# dumbbell hold  4 medium hurdles: small yellow 3 laps step-to; 3 laps reciprocal; 3 laps side stepping; 3 laps backwards  Step ups: 6" step:  3x10 (slight contralateral march)  Right heel raise: 2x10  Right leg press: seat 4, 5 plates 2x20  Right eccentric hamstring curl machine (down 2, up 1): 3x10 4 plates  Lateral step downs: 4" step 3x8, right (knee discomfort)    Not today  Forward step downs: 2x10 right, 4 inch step - not today  Toe tap behind, heel tap in front: 20x each (simulating taking a step)  Single leg stance with trampoline ball toss: 1 minutes   High knee marches on foam: 1 minutes   C sweeps: 2x10 right, green theraband foam   Split squats: 3x8 right  BOSU stepovers (round side up): 20x each  Lateral step to pause on BOSU (round side up): 20x each  Lateral walks: yellow theraband in // bars 3 laps  F[employer] First Balance Board:Static Balance (A/P) (S/S) 2x1' ea  BOSU stance (round side down): 2x1  minutes    Gait training for 10 minutes:   Ladder stepping for step rhythm (no jumping or hopping): 4 rounds  Ladder side-steppin rounds  Frank (small yellowstep overs: 4 laps in // bars    Patient Education and Home Exercises     Home Exercises Provided and Patient Education Provided   Education provided:   - use of and fitting of hinge brace  - benefits of NMES and TENS - units to purchase  - blood flow restriction therapy  - sitting with brace unlocked for a little at home    Written Home Exercises Provided: yes. Exercises were reviewed and Terri was able to " demonstrate them prior to the end of the session.  Terri demonstrated good  understanding of the education provided. See EMR under Patient Instructions for exercises provided during therapy sessions    ASSESSMENT     Terri arrives status post ACL repair with quad tendon autograph and MQTFL reconstruction with semitendinosis allograph. Pt did well today and quad fatigue present after BFR particularly with squat exercises. Progress to standing weight bearing activities soon and may discharge BFR for functional activity progressions. Full knee range of motion present and improvement in limp after gait training. Lacking good high level quad strength, no straight leg raise lag present with straight leg raise test.     Pt prognosis is Good.     Pt will continue to benefit from skilled outpatient physical therapy to address the deficits listed in the problem list box on initial evaluation, provide pt/family education and to maximize pt's level of independence in the home and community environment.     Pt's spiritual, cultural and educational needs considered and pt agreeable to plan of care and goals.     Anticipated barriers to physical therapy: chronic nature of symptoms    Goals:   Short Term Goals: 6 weeks   1.  Patient will demonstrate right knee ROM at least 5-0-120 degree. MET  2.  Patient will demonstrate right quad strength by performing 3x10 of active straight leg raise without lag.  MET  3.  Patient will report worst right knee pain < 4/10 to improve ADL performance. MET     Long Term Goals: 16 weeks   1.  Patient will demonstrate ability to perform > 12 sit <> stand transfers w/o UE use for improved ability to stand from low surfaces. PROGRESSING; NOT MET  2.  Patient will demonstrate right quad strength via MicroFET of > 27 kg to improve transfers, gait and ADL performance. PROGRESSING; NOT MET  3.  Patient will demonstrate to PT comprehensive understanding of each HEP component without verbal cueing.  PROGRESSING; NOT MET  4.  Patient will improve FOTO to < 43% to improve ADL performance. PROGRESSING; NOT MET  5.  Patient will demonstrate ability to negotiate a flight of stairs with reciprocal pattern, no handrail and no compensation or symptoms. PROGRESSING; NOT MET    PLAN     Cont with POC per ACL quad tendon protocol  Currently phase IV (squats/leg press 0-60 degree, single leg stance/stability exercises)  BFR PRN    Darius Moore, PT

## 2023-03-27 ENCOUNTER — CLINICAL SUPPORT (OUTPATIENT)
Dept: REHABILITATION | Facility: HOSPITAL | Age: 38
End: 2023-03-27
Attending: ORTHOPAEDIC SURGERY
Payer: MEDICAID

## 2023-03-27 DIAGNOSIS — M62.81 QUADRICEPS WEAKNESS: ICD-10-CM

## 2023-03-27 DIAGNOSIS — Z74.09 DECREASED FUNCTIONAL MOBILITY AND ENDURANCE: Primary | ICD-10-CM

## 2023-03-27 PROCEDURE — 97140 MANUAL THERAPY 1/> REGIONS: CPT | Mod: PN

## 2023-03-27 PROCEDURE — 97110 THERAPEUTIC EXERCISES: CPT | Mod: PN

## 2023-03-27 NOTE — PROGRESS NOTES
[facility]   Physical Therapy Treatment Note     Name: Terri Summers  Clinic Number: [id number]    Therapy Diagnosis:   Encounter Diagnoses   Name Primary?    Decreased functional mobility and endurance Yes    Quadriceps weakness      Physician: Shan Crystal MD    Visit Date: 3/27/2023    Physician Orders: PT Eval and Treat      Medical Diagnosis from Referral:   S83.004D (ICD-10-CM) - Traumatic dislocation of patella, right, subsequent encounter   S83.511A (ICD-10-CM) - Chronic rupture of ACL of right knee      Evaluation Date: 11/22/2022  Plan of Care Expiration: 4/7/23   Progress Note Due: 4/7/23  Visit # / Visits authorized: 20/20 (+6 from 2022),   FOTO: 1/27/2023  PTA Visit: 0/5    Precautions: standard, post-op ACL with quad tendon autograph protocol; NO LOADED FLEXION PAST 90 x 4 months; May unlock hinged knee brace and WBAT.    *PRECAUTION ? Graft at weakest point during this period.  No impact loading activities such as  jumping, running, pivoting, or cutting    Time In: 8:05 AM  Time Out: 8:45 AM  Total Billable Time: 30 minutes (TEx1, MTx1)    SUBJECTIVE     Pt reports: slipped on some water last Thursday where she fell backwards on her buttocks on the ground. Both of her knees were flexed back, denies hearing a pop, and was able to stand up and leave the site. She did feel immediate pain and there was an increase in swelling at the anterior lateral aspect of her right knee but most of her pain was at the anterior medial aspect of her right knee. Swelling is better since the fall, but pain is about the same. She is having more trouble at night due to pain, and her knee has buckled slightly at times throughout the day.    She was compliant with her home exercise program.    Response to previous treatment: no problems.  Functional change: no longer using any AD or braces    Pain: 2-3/10  Location: right knee      OBJECTIVE     See re-assessment on 12/16/22    Date of  "surgery: 12/15/22 by Dr. Crystal  Procedure:  Right ACL reconstruction with quadriceps autograft   Right all-inside lateral meniscus repair  Right medial quadriceps tendon reconstruction with semitendinosis allograft     23:  Right quad: 23.8 kg  30" sit <> stand: 10x (mild left weight shift)  Single leg stance: 1 minutes without upper extremity     Treatment     Terri received the treatments listed below      Manual therapy for 10 minutes including:  E[place]age to right knee  STM to quad, distal adductors, calf, and IT band    therapeutic exercises to develop strength, endurance, ROM, flexibility, posture, and core stabilization for minutes 20 including:    Assessment of left knee  Quad sets: 20x5'' holds with double towel under knee    Not today:  Bike: 6 minutes level 2 for improved cardiovascular endurance  Blood flow resistance trainin% LOP (UOP: 150; 70% = 112 mmHg) with following exercises performing 30, 15, 15, 15 with 30 second rest breaks between sets (medium cuff)  SLR flexion    Long arc quad: right, 2#  Squats: 30x, and 2 rounds of 15x    Right single leg Bridges 2x15 right with 10# dumbbell hold  4 medium hurdles: small yellow 3 laps step-to; 3 laps reciprocal; 3 laps side stepping; 3 laps backwards  Step ups: 6" step:  3x10 (slight contralateral march)  Right heel raise: 2x10  Right leg press: seat 4, 5 plates 2x20  Right eccentric hamstring curl machine (down 2, up 1): 3x10 4 plates  Lateral step downs: 4" step 3x8, right  Forward step downs: 2x10 right, 4 inch step - not today  Toe tap behind, heel tap in front: 20x each (simulating taking a step)  Single leg stance with trampoline ball toss: 1 minutes   High knee marches on foam: 1 minutes   C sweeps: 2x10 right, green theraband foam   Split squats: 3x8 right  BOSU stepovers (round side up): 20x each  Lateral step to pause on BOSU (round side up): 20x each  Lateral walks: yellow theraband in // bars 3 laps  F[employer] First Balance " Board:Static Balance (A/P) (S/S) 2x1' ea  BOSU stance (round side down): 2x1  minutes    Ice applied to right knee for 10 minutes in supine for pain relief and decrease in swelling.    Patient Education and Home Exercises     Home Exercises Provided and Patient Education Provided     Education provided:   - use of and fitting of hinge brace  - benefits of NMES and TENS - units to purchase  - blood flow restriction therapy  - sitting with brace unlocked for a little at home    Written Home Exercises Provided: yes. Exercises were reviewed and Terri was able to demonstrate them prior to the end of the session.  Terri demonstrated good  understanding of the education provided. See EMR under Patient Instructions for exercises provided during therapy sessions    ASSESSMENT     Terri arrives status post ACL repair with quad tendon autograph and MQTFL reconstruction with semitendinosis allograph. No exercises today and gently assessed status of right knee after injury last Thursday. Reports of instability with knee buckling and walking with a mild limp compared to her normal limp. Did not test anterior drawer or ACL integrity due to pain and mild guarding. May test next visit, she will continue quad sets at home and she was instructed to wear her T-scope brace (locked to 90 degrees) for the next couple of days along with decreasing her weight bearing activities for now. PT will message MD about occurrence.     Pt prognosis is Good.     Pt will continue to benefit from skilled outpatient physical therapy to address the deficits listed in the problem list box on initial evaluation, provide pt/family education and to maximize pt's level of independence in the home and community environment.     Pt's spiritual, cultural and educational needs considered and pt agreeable to plan of care and goals.     Anticipated barriers to physical therapy: chronic nature of symptoms    Goals:   Short Term Goals: 6 weeks   1.  Patient will  demonstrate right knee ROM at least 5-0-120 degree. MET  2.  Patient will demonstrate right quad strength by performing 3x10 of active straight leg raise without lag.  MET  3.  Patient will report worst right knee pain < 4/10 to improve ADL performance. MET     Long Term Goals: 16 weeks   1.  Patient will demonstrate ability to perform > 12 sit <> stand transfers w/o UE use for improved ability to stand from low surfaces. PROGRESSING; NOT MET  2.  Patient will demonstrate right quad strength via MicroFET of > 27 kg to improve transfers, gait and ADL performance. PROGRESSING; NOT MET  3.  Patient will demonstrate to PT comprehensive understanding of each HEP component without verbal cueing. PROGRESSING; NOT MET  4.  Patient will improve FOTO to < 43% to improve ADL performance. PROGRESSING; NOT MET  5.  Patient will demonstrate ability to negotiate a flight of stairs with reciprocal pattern, no handrail and no compensation or symptoms. PROGRESSING; NOT MET    PLAN     Cont with POC per ACL quad tendon protocol  Currently phase IV (squats/leg press 0-60 degree, single leg stance/stability exercises)  BFR PRN    Darius Moore, PT

## 2023-03-28 ENCOUNTER — TELEPHONE (OUTPATIENT)
Dept: ORTHOPEDICS | Facility: CLINIC | Age: 38
End: 2023-03-28
Payer: MEDICAID

## 2023-03-28 DIAGNOSIS — R52 PAIN: Primary | ICD-10-CM

## 2023-03-28 NOTE — TELEPHONE ENCOUNTER
----- Message from Shan Crystal MD sent at 3/27/2023  8:42 PM CDT -----  Regarding: RE: Patient update  Thanks for the notification Darius. We will get her back in to re-evaluate the knee this week.  ----- Message -----  From: Maggie Dillard MA  Sent: 3/27/2023   4:36 PM CDT  To: Shan Crystal MD  Subject: FW: Patient update                                 ----- Message -----  From: Darius Moore PT  Sent: 3/27/2023   4:33 PM CDT  To: [facility] Torrez Staff  Subject: Patient update                                   [facility] Dr. Crystal,    We are seeing Ms. Summers at [place] after her ACL and MPFL repair; you saw her last Tuesday in clinic. She slipped on some water last Thursday where she fell backwards on her buttocks on the ground. Both of her knees were flexed back, denies hearing a pop, and was able to stand up and leave the site. She did feel immediate pain and there was an increase in swelling at the anterior lateral aspect of her right knee; most of her pain was at anterior medial aspect. Swelling is better since the fall, but pain is about the same.     I wanted to give her some more time before I assessed her knee myself due to high irritability today, but I wanted to let you know in case you wanted to see her again. Quad weakness has been a big issue with her, but was stable in mobility overall before the fall. I told her to wear her brace locked out at 90 degrees for now and take it easy with weight bearing activities.    Thank you,  Darius Moore, PT, DPT

## 2023-03-29 ENCOUNTER — HOSPITAL ENCOUNTER (OUTPATIENT)
Dept: RADIOLOGY | Facility: HOSPITAL | Age: 38
Discharge: HOME | End: 2023-03-29
Attending: ORTHOPAEDIC SURGERY
Payer: MEDICAID

## 2023-03-29 ENCOUNTER — OFFICE VISIT (OUTPATIENT)
Dept: ORTHOPEDICS | Facility: CLINIC | Age: 38
End: 2023-03-29
Payer: MEDICAID

## 2023-03-29 VITALS
BODY MASS INDEX: 29.23 KG/M2 | WEIGHT: 181.88 LBS | HEIGHT: 66 IN | SYSTOLIC BLOOD PRESSURE: 145 MMHG | DIASTOLIC BLOOD PRESSURE: 104 MMHG | HEART RATE: 118 BPM

## 2023-03-29 DIAGNOSIS — S83.004D: ICD-10-CM

## 2023-03-29 DIAGNOSIS — Z98.890 S/P ACL RECONSTRUCTION: Primary | ICD-10-CM

## 2023-03-29 DIAGNOSIS — R52 PAIN: ICD-10-CM

## 2023-03-29 PROCEDURE — 3080F DIAST BP >= 90 MM HG: CPT | Mod: CPTII,,, | Performed by: ORTHOPAEDIC SURGERY

## 2023-03-29 PROCEDURE — 99999 PR PBB SHADOW E&M-EST. PATIENT-LVL III: ICD-10-PCS | Mod: PBBFAC,,, | Performed by: ORTHOPAEDIC SURGERY

## 2023-03-29 PROCEDURE — 1159F PR MEDICATION LIST DOCUMENTED IN MEDICAL RECORD: ICD-10-PCS | Mod: CPTII,,, | Performed by: ORTHOPAEDIC SURGERY

## 2023-03-29 PROCEDURE — 3008F PR BODY MASS INDEX (BMI) DOCUMENTED: ICD-10-PCS | Mod: CPTII,,, | Performed by: ORTHOPAEDIC SURGERY

## 2023-03-29 PROCEDURE — 1159F MED LIST DOCD IN RCRD: CPT | Mod: CPTII,,, | Performed by: ORTHOPAEDIC SURGERY

## 2023-03-29 PROCEDURE — 99214 OFFICE O/P EST MOD 30 MIN: CPT | Mod: S$PBB,,, | Performed by: ORTHOPAEDIC SURGERY

## 2023-03-29 PROCEDURE — 3080F PR MOST RECENT DIASTOLIC BLOOD PRESSURE >= 90 MM HG: ICD-10-PCS | Mod: CPTII,,, | Performed by: ORTHOPAEDIC SURGERY

## 2023-03-29 PROCEDURE — 3008F BODY MASS INDEX DOCD: CPT | Mod: CPTII,,, | Performed by: ORTHOPAEDIC SURGERY

## 2023-03-29 PROCEDURE — 73564 X-RAY EXAM KNEE 4 OR MORE: CPT | Mod: TC,PN,RT

## 2023-03-29 PROCEDURE — 99214 PR OFFICE/OUTPT VISIT, EST, LEVL IV, 30-39 MIN: ICD-10-PCS | Mod: S$PBB,,, | Performed by: ORTHOPAEDIC SURGERY

## 2023-03-29 PROCEDURE — 1160F RVW MEDS BY RX/DR IN RCRD: CPT | Mod: CPTII,,, | Performed by: ORTHOPAEDIC SURGERY

## 2023-03-29 PROCEDURE — 73564 XR KNEE COMP 4 OR MORE VIEWS RIGHT: ICD-10-PCS | Mod: 26,RT,, | Performed by: RADIOLOGY

## 2023-03-29 PROCEDURE — 73564 X-RAY EXAM KNEE 4 OR MORE: CPT | Mod: 26,RT,, | Performed by: RADIOLOGY

## 2023-03-29 PROCEDURE — 99213 OFFICE O/P EST LOW 20 MIN: CPT | Mod: PBBFAC,PN | Performed by: ORTHOPAEDIC SURGERY

## 2023-03-29 PROCEDURE — 3077F PR MOST RECENT SYSTOLIC BLOOD PRESSURE >= 140 MM HG: ICD-10-PCS | Mod: CPTII,,, | Performed by: ORTHOPAEDIC SURGERY

## 2023-03-29 PROCEDURE — 1160F PR REVIEW ALL MEDS BY PRESCRIBER/CLIN PHARMACIST DOCUMENTED: ICD-10-PCS | Mod: CPTII,,, | Performed by: ORTHOPAEDIC SURGERY

## 2023-03-29 PROCEDURE — 99999 PR PBB SHADOW E&M-EST. PATIENT-LVL III: CPT | Mod: PBBFAC,,, | Performed by: ORTHOPAEDIC SURGERY

## 2023-03-29 PROCEDURE — 3077F SYST BP >= 140 MM HG: CPT | Mod: CPTII,,, | Performed by: ORTHOPAEDIC SURGERY

## 2023-03-29 NOTE — PROGRESS NOTES
"Patient ID:   Terri Summers is a 37 y.o. female.    Chief Complaint:   Right knee re-injury    HPI:   The patient is 3m 14d s/p R ACL reconstruction with quadriceps autograft, MQTFL reconstruction with allograft. She re-injured the knee last week when she slipped on water and fell backwards on the buttocks. She is reporting pain over the medial joint line. She reports increased pain since the fall.     Medications:    Current Outpatient Medications:     acetaminophen (TYLENOL) 325 MG tablet, Take 325 mg by mouth every 6 (six) hours as needed for Pain., Disp: , Rfl:     ALPRAZolam (XANAX) 0.5 MG tablet, Take 1 tablet (0.5 mg total) by mouth daily as needed for Anxiety., Disp: 30 tablet, Rfl: 5    dextroamphetamine-amphetamine (ADDERALL) 30 mg Tab, Take 1 tablet (30 mg total) by mouth 2 (two) times daily., Disp: 60 tablet, Rfl: 0    [START ON 4/22/2023] dextroamphetamine-amphetamine 30 mg Tab, Take 1 tablet (30 mg total) by mouth 2 (two) times daily., Disp: 60 tablet, Rfl: 0    dextroamphetamine-amphetamine 30 mg Tab, Take 1 tablet (30 mg total) by mouth 2 (two) times daily., Disp: 60 tablet, Rfl: 0    ergocalciferol (ERGOCALCIFEROL) 50,000 unit Cap, Take 50,000 Units by mouth every 7 days., Disp: , Rfl:     HYDROcodone-acetaminophen (NORCO) 5-325 mg per tablet, Take 1 tablet by mouth every 6 (six) hours as needed for Pain., Disp: 28 tablet, Rfl: 0    ibuprofen (ADVIL,MOTRIN) 800 MG tablet, TK 1 T PO BID WF PRF PAIN, Disp: , Rfl: 0    meloxicam (MOBIC) 15 MG tablet, Take 15 mg by mouth., Disp: , Rfl:     Allergies:  Review of patient's allergies indicates:  No Known Allergies    Vitals:  BP (!) 145/104   Pulse (!) 118   Ht 5' 6" (1.676 m)   Wt 82.5 kg (181 lb 14.1 oz)   BMI 29.36 kg/m²     Physical Examination:  Ortho Exam   Right knee exam:  1+ effusion.  ROM 0-135  Lachmans 1A  Patellar mobility    Imaging Studies:  I have ordered and independently reviewed the following imaging studies performed at [facility] " today    X-Ray Knee Complete 4 Or More Views Right  Narrative: EXAMINATION:  XR KNEE COMP 4 OR MORE VIEWS RIGHT    CLINICAL HISTORY:  Pain, unspecified    TECHNIQUE:  XR KNEE COMP 4 OR MORE VIEWS RIGHT    COMPARISON:  MRI 10/26/2022 and x-ray 10/18/2022    FINDINGS:  Interval ACL reconstruction.  Soft tissue swelling at the level of the medial patellofemoral compartment on the sunrise view with questionable cortical irregularity of the medial patellar facet at the retinacular insertion.  Large joint effusion and periarticular soft tissue swelling suggests sequela of transient lateral patellar dislocation and possible small osteochondral fracture of the patella.  Impression: See above    This report was flagged in Epic as abnormal.    Electronically signed by: Burak Vieira Jr  Date:    03/29/2023  Time:    10:47        Assessment:  1. S/P ACL reconstruction    2. Traumatic dislocation of patella, right, subsequent encounter      Plan:  The patient's knee feels stable. I do not believe she retore her grafts. She is painful over the medial aspect of the knee. She is going top observe her symptoms over the next two weeks. She return at that time and if not improving, I will get a MRI. I have advised continuing physical therapy.       No follow-ups on file.

## 2023-03-30 RX ORDER — TRAMADOL HYDROCHLORIDE 50 MG/1
50 TABLET ORAL EVERY 6 HOURS PRN
Qty: 20 TABLET | Refills: 0 | Status: SHIPPED | OUTPATIENT
Start: 2023-03-30

## 2023-04-03 ENCOUNTER — CLINICAL SUPPORT (OUTPATIENT)
Dept: REHABILITATION | Facility: HOSPITAL | Age: 38
End: 2023-04-03
Payer: MEDICAID

## 2023-04-03 DIAGNOSIS — M62.81 QUADRICEPS WEAKNESS: ICD-10-CM

## 2023-04-03 DIAGNOSIS — Z74.09 DECREASED FUNCTIONAL MOBILITY AND ENDURANCE: Primary | ICD-10-CM

## 2023-04-03 PROCEDURE — 97110 THERAPEUTIC EXERCISES: CPT | Mod: PN

## 2023-04-03 NOTE — PROGRESS NOTES
"[facility]   Physical Therapy Treatment Note     Name: Terri Summers  Clinic Number: [id number]    Therapy Diagnosis:   Encounter Diagnoses   Name Primary?    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    *PRECAUTION ? Graft at weakest point during this period.  No impact loading activities such as  jumping, running, pivoting, or cutting    Time In: 8:05 AM  Time Out: 9:00 AM  Total Billable Time: 55 minutes (TEx4)    SUBJECTIVE     Pt reports: knee pain is about the same and still feels very sore on the inside of her knee. Went to see MD last Tuesday and received x-ray. Was cleared for now and can resume PT.    She was compliant with her home exercise program.    Response to previous treatment: no problems.  Functional change: no longer using any AD or braces    Pain: 7/10  Location: right knee      OBJECTIVE     See re-assessment on 12/16/22    Date of surgery: 12/15/22 by Dr. Crystal  Procedure:  Right ACL reconstruction with quadriceps autograft   Right all-inside lateral meniscus repair  Right medial quadriceps tendon reconstruction with semitendinosis allograft     2/27/23:  Right quad: 23.8 kg  30" sit <> stand: 10x (mild left weight shift)  Single leg stance: 1 minutes without upper extremity     Treatment     Terri received the treatments listed " "below      Manual therapy for 10 minutes including:  Effleurage to right knee  STM to quad, distal adductors, calf, and IT band    therapeutic exercises to develop strength, endurance, ROM, flexibility, posture, and core stabilization for minutes 20 including:    Quad sets: 20x5'' holds with double towel under knee  Straight leg raise: 5x10, 1.5#, right  Prone hip extension + abduction: 2x15, right  Bike: 5 minutes level 2 for improved cardiovascular endurance  Leg press: 3x15, 4.5 plates (0-80 degrees) double leg    Not today:  Blood flow resistance trainin% LOP (UOP: 150; 70% = 112 mmHg) with following exercises performing 30, 15, 15, 15 with 30 second rest breaks between sets (medium cuff)  SLR flexion    Long arc quad: right, 2#  Squats: 30x, and 2 rounds of 15x    Right single leg Bridges 2x15 right with 10# dumbbell hold  4 medium hurdles: small yellow 3 laps step-to; 3 laps reciprocal; 3 laps side stepping; 3 laps backwards  Step ups: 6" step:  3x10 (slight contralateral march)  Right heel raise: 2x10  Right leg press: seat 4, 5 plates 2x20  Right eccentric hamstring curl machine (down 2, up 1): 3x10 4 plates  Lateral step downs: 4" step 3x8, right  Forward step downs: 2x10 right, 4 inch step - not today  Toe tap behind, heel tap in front: 20x each (simulating taking a step)  Single leg stance with trampoline ball toss: 1 minutes   High knee marches on foam: 1 minutes   C sweeps: 2x10 right, green theraband foam   Split squats: 3x8 right  BOSU stepovers (round side up): 20x each  Lateral step to pause on BOSU (round side up): 20x each  Lateral walks: yellow theraband in // bars 3 laps  Fitter First Balance Board:Static Balance (A/P) (S/S) 2x1' ea  BOSU stance (round side down): 2x1  minutes    Ice applied to right knee for 00 minutes in supine for pain relief and decrease in swelling.    Patient Education and Home Exercises     Home Exercises Provided and Patient Education Provided     Education " provided:   - use of and fitting of hinge brace  - benefits of NMES and TENS - units to purchase  - blood flow restriction therapy  - sitting with brace unlocked for a little at home    Written Home Exercises Provided: yes. Exercises were reviewed and Terri was able to demonstrate them prior to the end of the session.  Terri demonstrated good  understanding of the education provided. See EMR under Patient Instructions for exercises provided during therapy sessions    ASSESSMENT     Terri arrives status post ACL repair with quad tendon autograph and MQTFL reconstruction with semitendinosis allograph. pt reported pain since injury about the same, but knee irritability appeared better overall since injury. Focusing on closed chain activities, isometrics, pain control, and safe mobility. She was able to tolerate more exercises, less tenderness to palpation ( at MCL and medial meniscus), and slight improved limp. Continue Phase II exercises and pain management until knee irritability improves.     Pt prognosis is Good.     Pt will continue to benefit from skilled outpatient physical therapy to address the deficits listed in the problem list box on initial evaluation, provide pt/family education and to maximize pt's level of independence in the home and community environment.     Pt's spiritual, cultural and educational needs considered and pt agreeable to plan of care and goals.     Anticipated barriers to physical therapy: chronic nature of symptoms    Goals:   Short Term Goals: 6 weeks   1.  Patient will demonstrate right knee ROM at least 5-0-120 degree. MET  2.  Patient will demonstrate right quad strength by performing 3x10 of active straight leg raise without lag.  MET  3.  Patient will report worst right knee pain < 4/10 to improve ADL performance. MET     Long Term Goals: 16 weeks   1.  Patient will demonstrate ability to perform > 12 sit <> stand transfers w/o UE use for improved ability to stand  from low surfaces. PROGRESSING; NOT MET  2.  Patient will demonstrate right quad strength via MicroFET of > 27 kg to improve transfers, gait and ADL performance. PROGRESSING; NOT MET  3.  Patient will demonstrate to PT comprehensive understanding of each HEP component without verbal cueing. PROGRESSING; NOT MET  4.  Patient will improve FOTO to < 43% to improve ADL performance. PROGRESSING; NOT MET  5.  Patient will demonstrate ability to negotiate a flight of stairs with reciprocal pattern, no handrail and no compensation or symptoms. PROGRESSING; NOT MET    PLAN     Cont with POC per ACL quad tendon protocol  Currently phase IV (squats/leg press 0-60 degree, single leg stance/stability exercises)  BFR PRN    Darius Moore, PT

## 2023-04-05 ENCOUNTER — PATIENT MESSAGE (OUTPATIENT)
Dept: REHABILITATION | Facility: HOSPITAL | Age: 38
End: 2023-04-05

## 2023-04-12 ENCOUNTER — CLINICAL SUPPORT (OUTPATIENT)
Dept: REHABILITATION | Facility: HOSPITAL | Age: 38
End: 2023-04-12
Payer: MEDICAID

## 2023-04-12 ENCOUNTER — OFFICE VISIT (OUTPATIENT)
Dept: ORTHOPEDICS | Facility: CLINIC | Age: 38
End: 2023-04-12
Payer: MEDICAID

## 2023-04-12 VITALS
HEIGHT: 66 IN | SYSTOLIC BLOOD PRESSURE: 154 MMHG | BODY MASS INDEX: 29.23 KG/M2 | WEIGHT: 181.88 LBS | HEART RATE: 116 BPM | DIASTOLIC BLOOD PRESSURE: 106 MMHG

## 2023-04-12 DIAGNOSIS — M62.81 QUADRICEPS WEAKNESS: ICD-10-CM

## 2023-04-12 DIAGNOSIS — S83.004D: ICD-10-CM

## 2023-04-12 DIAGNOSIS — Z98.890 S/P LATERAL MENISCUS REPAIR OF RIGHT KNEE: ICD-10-CM

## 2023-04-12 DIAGNOSIS — Z98.890 S/P ACL RECONSTRUCTION: Primary | ICD-10-CM

## 2023-04-12 DIAGNOSIS — Z74.09 DECREASED FUNCTIONAL MOBILITY AND ENDURANCE: Primary | ICD-10-CM

## 2023-04-12 PROCEDURE — 1159F MED LIST DOCD IN RCRD: CPT | Mod: CPTII,,, | Performed by: ORTHOPAEDIC SURGERY

## 2023-04-12 PROCEDURE — 99214 OFFICE O/P EST MOD 30 MIN: CPT | Mod: S$PBB,,, | Performed by: ORTHOPAEDIC SURGERY

## 2023-04-12 PROCEDURE — 99214 PR OFFICE/OUTPT VISIT, EST, LEVL IV, 30-39 MIN: ICD-10-PCS | Mod: S$PBB,,, | Performed by: ORTHOPAEDIC SURGERY

## 2023-04-12 PROCEDURE — 1160F PR REVIEW ALL MEDS BY PRESCRIBER/CLIN PHARMACIST DOCUMENTED: ICD-10-PCS | Mod: CPTII,,, | Performed by: ORTHOPAEDIC SURGERY

## 2023-04-12 PROCEDURE — 97110 THERAPEUTIC EXERCISES: CPT | Mod: PN | Performed by: PHYSICAL THERAPIST

## 2023-04-12 PROCEDURE — 3008F PR BODY MASS INDEX (BMI) DOCUMENTED: ICD-10-PCS | Mod: CPTII,,, | Performed by: ORTHOPAEDIC SURGERY

## 2023-04-12 PROCEDURE — 99999 PR PBB SHADOW E&M-EST. PATIENT-LVL III: CPT | Mod: PBBFAC,,, | Performed by: ORTHOPAEDIC SURGERY

## 2023-04-12 PROCEDURE — 99999 PR PBB SHADOW E&M-EST. PATIENT-LVL III: ICD-10-PCS | Mod: PBBFAC,,, | Performed by: ORTHOPAEDIC SURGERY

## 2023-04-12 PROCEDURE — 3077F SYST BP >= 140 MM HG: CPT | Mod: CPTII,,, | Performed by: ORTHOPAEDIC SURGERY

## 2023-04-12 PROCEDURE — 3080F DIAST BP >= 90 MM HG: CPT | Mod: CPTII,,, | Performed by: ORTHOPAEDIC SURGERY

## 2023-04-12 PROCEDURE — 99213 OFFICE O/P EST LOW 20 MIN: CPT | Mod: PBBFAC,PN | Performed by: ORTHOPAEDIC SURGERY

## 2023-04-12 PROCEDURE — 1160F RVW MEDS BY RX/DR IN RCRD: CPT | Mod: CPTII,,, | Performed by: ORTHOPAEDIC SURGERY

## 2023-04-12 PROCEDURE — 3080F PR MOST RECENT DIASTOLIC BLOOD PRESSURE >= 90 MM HG: ICD-10-PCS | Mod: CPTII,,, | Performed by: ORTHOPAEDIC SURGERY

## 2023-04-12 PROCEDURE — 1159F PR MEDICATION LIST DOCUMENTED IN MEDICAL RECORD: ICD-10-PCS | Mod: CPTII,,, | Performed by: ORTHOPAEDIC SURGERY

## 2023-04-12 PROCEDURE — 3077F PR MOST RECENT SYSTOLIC BLOOD PRESSURE >= 140 MM HG: ICD-10-PCS | Mod: CPTII,,, | Performed by: ORTHOPAEDIC SURGERY

## 2023-04-12 PROCEDURE — 3008F BODY MASS INDEX DOCD: CPT | Mod: CPTII,,, | Performed by: ORTHOPAEDIC SURGERY

## 2023-04-12 NOTE — PROGRESS NOTES
Patient ID:   Terri Summers is a 37 y.o. female.    Chief Complaint:   3m 28d s/p R ACLR with quadriceps autograft, MQTFL reconstruction with semitendinosis allograft, lateral meniscus repair    HPI:   The patient is returning today for evaluation of her right knee.  She is nearly 4 months out from undergoing right ACL reconstruction with quadriceps autograft, and QT FL reconstruction with semitendinosis allograft, and lateral meniscus repair.  Approximately 3 weeks ago she slipped [place] local car lot.  I evaluated her shortly thereafter.  She was having quite a bit of pain in the knee joint.  I felt like the knee was stable on my exam.  I told her to observe her symptoms.  She is returning today reporting that the pain is not any better.  Pain level is 8/10.  Today most of her pain is along the medial aspect of the knee.  She reports some buckling feelings within the knee.  She reports swelling within the knee.    Medications:    Current Outpatient Medications:     acetaminophen (TYLENOL) 325 MG tablet, Take 325 mg by mouth every 6 (six) hours as needed for Pain., Disp: , Rfl:     ALPRAZolam (XANAX) 0.5 MG tablet, Take 1 tablet (0.5 mg total) by mouth daily as needed for Anxiety., Disp: 30 tablet, Rfl: 5    dextroamphetamine-amphetamine (ADDERALL) 30 mg Tab, Take 1 tablet (30 mg total) by mouth 2 (two) times daily., Disp: 60 tablet, Rfl: 0    [START ON 4/22/2023] dextroamphetamine-amphetamine 30 mg Tab, Take 1 tablet (30 mg total) by mouth 2 (two) times daily., Disp: 60 tablet, Rfl: 0    dextroamphetamine-amphetamine 30 mg Tab, Take 1 tablet (30 mg total) by mouth 2 (two) times daily., Disp: 60 tablet, Rfl: 0    ergocalciferol (ERGOCALCIFEROL) 50,000 unit Cap, Take 50,000 Units by mouth every 7 days., Disp: , Rfl:     ibuprofen (ADVIL,MOTRIN) 800 MG tablet, TK 1 T PO BID WF PRF PAIN, Disp: , Rfl: 0    meloxicam (MOBIC) 15 MG tablet, Take 15 mg by mouth., Disp: , Rfl:     traMADoL (ULTRAM) 50 mg tablet, Take 1 tablet  "(50 mg total) by mouth every 6 (six) hours as needed for Pain., Disp: 20 tablet, Rfl: 0    Allergies:  Review of patient's allergies indicates:  No Known Allergies     Vitals:  BP (!) 154/106   Pulse (!) 116   Ht 5' 6" (1.676 m)   Wt 82.5 kg (181 lb 14.1 oz)   BMI 29.36 kg/m²     Physical Examination:  Ortho Exam   Right knee exam:  Positive effusion.  Tenderness along the medial joint line.   There is some pain with valgus stress of the knee.    Knee range of motion is from 0-130.    Lachman's 1A.  Posterior drawer negative.  No medial or lateral gapping although some pain on the medial side with valgus stress.  Pain with Edison's.  Negative pivot shift.    Assessment:  1. S/P ACL reconstruction    2. Traumatic dislocation of patella, right, subsequent encounter    3. S/P lateral meniscus repair of right knee      Plan:  I reviewed the findings with the patient in detail.  Given her continued pain, I would recommend that she undergo MRI scan to further evaluate the knee.  First, I would like to check the integrity of her graft.  Second I would like to rule out an MCL and/or medial meniscus injury.    Orders Placed This Encounter    MRI Knee Without Contrast Right     No follow-ups on file.          "

## 2023-04-12 NOTE — PLAN OF CARE
"  Outpatient Therapy Updated Plan of Care     Visit Date: 4/12/2023  Name: Terri Summers  Clinic Number: [id number]    Therapy Diagnosis:   Encounter Diagnoses   Name Primary?    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 Crystal later today again. Her pain now is almost unbearable. She tears up when sitting at her desk. Her pain is sharp and her medial knee feels very bruised. She's been sleeping on the couch with a pillow on the outside of her leg to help bring a little comfort. Her knee is buckling more since her fall.     Objective     Update:     All below testing performed in seated position. Gait belt used for quadriceps testing.    Lower extremity strength with DLC handheld dynamometer Right Left Pain/dysfunction with movement   (approx 4 sec hold w/ max contraction)   Hip flexion 17.9 kg  19.3 kg     Hip abduction  4+/5 5/5    Quadriceps 18.4 kg  30.1 kg     Hamstrings 12.9 kg  19 kg        Right knee flexion AROM: 5-0-135 degree     30" sit <> stand: 11x (mild left weight shift)  Squat: nearly even weight shift, 50% depth  Single leg stance: 43 seconds without upper extremity   Step ups: difficulty ascending with right due to pain. Poor/fair eccentric quad control descending with left  Gait: significant right trendelenberg    Assessment     Update: Terri arrives status post ACL repair with quad " tendon autograph and MQTFL reconstruction with semitendinosis allograph. She had been progressing well, but had a recent regression due to a fall on 3/23/23. Since then, pain has increased both at rest and with activity with increased reports of right knee buckling. Despite this, strength has mildly improved since last UPOC and range of motion remains WNL. We discussed possibility of dry needling next visit to help reduce pain and she is agreeable. MRI appears to be appropriate at this time to rule out any further damage prior to further progression in PT.     Previous Short Term Goals Status:   MET 2/3  1.  Patient will demonstrate right knee ROM at least 5-0-120 degree. MET  2.  Patient will demonstrate right quad strength by performing 3x10 of active straight leg raise without lag.  MET  3.  Patient will report worst right knee pain < 4/10 to improve ADL performance. PROGRESSING; NOT MET  New Short Term Goals Status:   none  Long Term Goal Status:   continue per initial plan of care.  1.  Patient will demonstrate ability to perform > 12 sit <> stand transfers w/o UE use for improved ability to stand from low surfaces. PROGRESSING; NOT MET  2.  Patient will demonstrate right quad strength via MicroFET of > 27 kg to improve transfers, gait and ADL performance. PROGRESSING; NOT MET  3.  Patient will demonstrate to PT comprehensive understanding of each HEP component without verbal cueing. MET  4.  Patient will improve FOTO to < 43% to improve ADL performance. PROGRESSING; NOT MET  5.  Patient will demonstrate ability to negotiate a flight of stairs with reciprocal pattern, no handrail and no compensation or symptoms. PROGRESSING; NOT MET  Reasons for Recertification of Therapy:   UPOC    Plan     Updated Certification Period: 4/12/2023 to 6/9/23  Recommended Treatment Plan: 2 times per week for 8 weeks: Electrical Stimulation TENS, IFC, NMES, Gait Training, Manual Therapy, Moist Heat/ Ice, Neuromuscular Re-ed,  Patient Education, Self Care, Therapeutic Activities, Therapeutic Exercise, and dry needling and blood flow restriction therapy  Other Recommendations: BFR; progress per typical quad tendon repair protocol    Shan Dolan, PT  4/12/2023      I CERTIFY THE NEED FOR THESE SERVICES FURNISHED UNDER THIS PLAN OF TREATMENT AND WHILE UNDER MY CARE    Physician's comments:        Physician's Signature: ___________________________________________________     I certify the need for these services furnished under this plan of treatment and while under my care.    I certify the need for these services furnished under this plan of treatment and while under my care.        Shan Crystal MD, FAAOS  , Orthopaedic Sports Medicine  Residency   [facility] Department of Orthopaedic Surgery  Assistant Orthopaedic Surgeon, [facility]  Head Team Physician, [facility]

## 2023-04-12 NOTE — PROGRESS NOTES
[facility]   Physical Therapy Treatment Note     Name: Terri Summers  Clinic Number: [id number]    Therapy Diagnosis:   Encounter Diagnoses   Name Primary?    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    *PRECAUTION ? Graft at weakest point during this period.  No impact loading activities such as  jumping, running, pivoting, or cutting    Time In: 8:05 AM  Time Out: 9:13 AM  Total Billable Time: 56 minutes (TEx4)    SUBJECTIVE     Pt reports: she sees Dr. Crystal later today again. Her pain now is almost unbearable. She tears up when sitting at her desk. Her pain is sharp and her medial knee feels very bruised. She's been sleeping on the couch with a pillow on the outside of her leg to help bring a little comfort. Her knee is buckling more since her fall.     She was compliant with her home exercise program.    Response to previous treatment: no problems.  Functional change: no longer using any AD or braces    Pain: 7/10  Location: right knee      OBJECTIVE     See re-assessment on 12/16/22    Date of surgery: 12/15/22 by Dr. Crystal  Procedure:  Right ACL reconstruction with quadriceps autograft   Right all-inside lateral meniscus repair  Right medial quadriceps tendon reconstruction with semitendinosis allograft         Treatment     Terri received the  "treatments listed below      Manual therapy for 00 minutes including:  Effleurage to right knee  STM to quad, distal adductors, calf, and IT band    therapeutic exercises to develop strength, endurance, ROM, flexibility, posture, and core stabilization for minutes 56 including testing:    Quad sets: 20x5'' holds with double towel under knee  Straight leg raise: 5x10, 1.5#, right  Single leg bridges: 2x10 each  Bridge walkouts: 10x  Side lying hip abduction: 3x15 (gluteus medius concentration)  NUSTEP: 6 minutes level 4 for improved cardiovascular endurance  Leg press: 3x15, 4.5 plates (0-80 degrees) double leg  Right leg press: 2x10 2 plates (0-80 degree)    Not today:  Prone hip extension + abduction: 2x15, right  Blood flow resistance trainin% LOP (UOP: 150; 70% = 112 mmHg) with following exercises performing 30, 15, 15, 15 with 30 second rest breaks between sets (medium cuff)  SLR flexion    Long arc quad: right, 2#  Squats: 30x, and 2 rounds of 15x    Right single leg Bridges 2x15 right with 10# dumbbell hold  4 medium hurdles: small yellow 3 laps step-to; 3 laps reciprocal; 3 laps side stepping; 3 laps backwards  Step ups: 6" step:  3x10 (slight contralateral march)  Right heel raise: 2x10  Right leg press: seat 4, 5 plates 2x20  Right eccentric hamstring curl machine (down 2, up 1): 3x10 4 plates  Lateral step downs: 4" step 3x8, right  Forward step downs: 2x10 right, 4 inch step - not today  Toe tap behind, heel tap in front: 20x each (simulating taking a step)  Single leg stance with trampoline ball toss: 1 minutes   High knee marches on foam: 1 minutes   C sweeps: 2x10 right, green theraband foam   Split squats: 3x8 right  BOSU stepovers (round side up): 20x each  Lateral step to pause on BOSU (round side up): 20x each  Lateral walks: yellow theraband in // bars 3 laps  F[employer] First Balance Board:Static Balance (A/P) (S/S) 2x1' ea  BOSU stance (round side down): 2x1  minutes    Ice applied to right " knee for 00 minutes in supine for pain relief and decrease in swelling.    Patient Education and Home Exercises     Home Exercises Provided and Patient Education Provided     Education provided:   - use of and fitting of hinge brace  - benefits of NMES and TENS - units to purchase  - blood flow restriction therapy  - sitting with brace unlocked for a little at home  - risks and benefits of dry needling    Written Home Exercises Provided: yes. Exercises were reviewed and Terri was able to demonstrate them prior to the end of the session.  Terri demonstrated good  understanding of the education provided. See EMR under Patient Instructions for exercises provided during therapy sessions    ASSESSMENT     Terri arrives status post ACL repair with quad tendon autograph and MQTFL reconstruction with semitendinosis allograph. See UPOC    Pt prognosis is Good.     Pt will continue to benefit from skilled outpatient physical therapy to address the deficits listed in the problem list box on initial evaluation, provide pt/family education and to maximize pt's level of independence in the home and community environment.     Pt's spiritual, cultural and educational needs considered and pt agreeable to plan of care and goals.     Anticipated barriers to physical therapy: chronic nature of symptoms    Goals:   Short Term Goals: 6 weeks   1.  Patient will demonstrate right knee ROM at least 5-0-120 degree. MET  2.  Patient will demonstrate right quad strength by performing 3x10 of active straight leg raise without lag.  MET  3.  Patient will report worst right knee pain < 4/10 to improve ADL performance. MET     Long Term Goals: 16 weeks   1.  Patient will demonstrate ability to perform > 12 sit <> stand transfers w/o UE use for improved ability to stand from low surfaces. PROGRESSING; NOT MET  2.  Patient will demonstrate right quad strength via MicroFET of > 27 kg to improve transfers, gait and ADL performance. PROGRESSING; NOT  MET  3.  Patient will demonstrate to PT comprehensive understanding of each HEP component without verbal cueing. PROGRESSING; NOT MET  4.  Patient will improve FOTO to < 43% to improve ADL performance. PROGRESSING; NOT MET  5.  Patient will demonstrate ability to negotiate a flight of stairs with reciprocal pattern, no handrail and no compensation or symptoms. PROGRESSING; NOT MET    PLAN     Cont with POC per ACL quad tendon protocol  Currently phase IV (squats/leg press 0-60 degree, single leg stance/stability exercises)  BFR PRN  May initiate dry needling    Shan Dolan, PT

## 2023-04-17 ENCOUNTER — CLINICAL SUPPORT (OUTPATIENT)
Dept: REHABILITATION | Facility: HOSPITAL | Age: 38
End: 2023-04-17
Payer: MEDICAID

## 2023-04-17 DIAGNOSIS — M62.81 QUADRICEPS WEAKNESS: ICD-10-CM

## 2023-04-17 DIAGNOSIS — Z74.09 DECREASED FUNCTIONAL MOBILITY AND ENDURANCE: Primary | ICD-10-CM

## 2023-04-17 PROCEDURE — 97110 THERAPEUTIC EXERCISES: CPT | Mod: PN | Performed by: PHYSICAL THERAPIST

## 2023-04-17 NOTE — PROGRESS NOTES
[facility]   Physical Therapy Treatment Note     Name: Terri Summers  Clinic Number: [id number]    Therapy Diagnosis:   Encounter Diagnoses   Name Primary?    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    *PRECAUTION ? Graft at weakest point during this period.  No impact loading activities such as  jumping, running, pivoting, or cutting    Time In: 8:03 AM  Time Out: 8:58 AM  Total Billable Time: 44 + 10 unattended/unbilled e-stim minutes (TEx3)    SUBJECTIVE     Pt reports: she's getting an MRI on Saturday morning. She didn't get it drained.     She was compliant with her home exercise program.    Response to previous treatment: no problems.  Functional change: no longer using any AD or braces    Pain: 7/10  Location: right knee      OBJECTIVE     See re-assessment on 12/16/22    Date of surgery: 12/15/22 by Dr. Crystal  Procedure:  Right ACL reconstruction with quadriceps autograft   Right all-inside lateral meniscus repair  Right medial quadriceps tendon reconstruction with semitendinosis allograft         Treatment     Terri received the treatments listed below      Manual therapy for 00 minutes including:  Effleurage to right knee  STM to quad, distal adductors, calf, and IT band    therapeutic exercises to develop strength, endurance, ROM, flexibility,  "posture, and core stabilization for minutes 44 including testing:    NUSTEP: 6 minutes sprints level 3 for improved cardiovascular endurance    Straight leg raise: 1.5 pounds 3x10 right   Single leg bridges: 2x10 each  Bridge walkouts: 10x  Side lying hip abduction: 3x15 (gluteus medius concentration)    Leg press: 3x15, 4.5 plates (0-80 degrees) double leg  Right leg press: 2x10 2 plates (0-80 degree)    Dry needling to right knee with e-stim to pain reduction    Not today:  Prone hip extension + abduction: 2x15, right  Blood flow resistance trainin% LOP (UOP: 150; 70% = 112 mmHg) with following exercises performing 30, 15, 15, 15 with 30 second rest breaks between sets (medium cuff)  SLR flexion    Long arc quad: right, 2#  Squats: 30x, and 2 rounds of 15x    Right single leg Bridges 2x15 right with 10# dumbbell hold  4 medium hurdles: small yellow 3 laps step-to; 3 laps reciprocal; 3 laps side stepping; 3 laps backwards  Step ups: 6" step:  3x10 (slight contralateral march)  Right heel raise: 2x10  Right leg press: seat 4, 5 plates 2x20  Right eccentric hamstring curl machine (down 2, up 1): 3x10 4 plates  Lateral step downs: 4" step 3x8, right  Forward step downs: 2x10 right, 4 inch step - not today  Toe tap behind, heel tap in front: 20x each (simulating taking a step)  Single leg stance with trampoline ball toss: 1 minutes   High knee marches on foam: 1 minutes   C sweeps: 2x10 right, green theraband foam   Split squats: 3x8 right  BOSU stepovers (round side up): 20x each  Lateral step to pause on BOSU (round side up): 20x each  Lateral walks: yellow theraband in // bars 3 laps  [employer] Balance Board:Static Balance (A/P) (S/S) 2x1' ea  BOSU stance (round side down): 2x1  minutes    Ice applied to right knee for 00 minutes in supine for pain relief and decrease in swelling.    Patient Education and Home Exercises     Home Exercises Provided and Patient Education Provided     Education provided:   - " "use of and fitting of hinge brace  - benefits of NMES and TENS - units to purchase  - blood flow restriction therapy  - sitting with brace unlocked for a little at home  - risks and benefits of dry needling - signed consent on 4/17/23    Written Home Exercises Provided: yes. Exercises were reviewed and Terri was able to demonstrate them prior to the end of the session.  Terri demonstrated good  understanding of the education provided. See EMR under Patient Instructions for exercises provided during therapy sessions    ASSESSMENT     Terri arrives status post ACL repair with quad tendon autograph and MQTFL reconstruction with semitendinosis allograph. Unable to use BFR today due to needing to be charged. Plan to resume next visit. Continued with endurance strengthening in non-painful positions until MRI results are found.     Terri was agreeable to dry needling by a certified dry needling PT and signed a consent prior to treatment after being made aware of risks. 2x60 mm needles were inserted into the vastus medialis, vastus lateralis; 2x25 mm needles in the medial and lateral "eye" of the patella; 2x30 mm needles in the tibialis anterior and peroneus longus. Periosteal pecking and winding for grasp were performed. Electrical stimulation was applied to these needles at 4 Hz and a pulse of 250 µseconds for 10 minutes with a long term check. Afterwards, needles were removed and placed into a sharps container. Terri reported a good response to treatment.     Pt prognosis is Good.     Pt will continue to benefit from skilled outpatient physical therapy to address the deficits listed in the problem list box on initial evaluation, provide pt/family education and to maximize pt's level of independence in the home and community environment.     Pt's spiritual, cultural and educational needs considered and pt agreeable to plan of care and goals.     Anticipated barriers to physical therapy: chronic nature of symptoms    Goals: "   Short Term Goals: 6 weeks   1.  Patient will demonstrate right knee ROM at least 5-0-120 degree. MET  2.  Patient will demonstrate right quad strength by performing 3x10 of active straight leg raise without lag.  MET  3.  Patient will report worst right knee pain < 4/10 to improve ADL performance. MET     Long Term Goals: 16 weeks   1.  Patient will demonstrate ability to perform > 12 sit <> stand transfers w/o UE use for improved ability to stand from low surfaces. PROGRESSING; NOT MET  2.  Patient will demonstrate right quad strength via MicroFET of > 27 kg to improve transfers, gait and ADL performance. PROGRESSING; NOT MET  3.  Patient will demonstrate to PT comprehensive understanding of each HEP component without verbal cueing. PROGRESSING; NOT MET  4.  Patient will improve FOTO to < 43% to improve ADL performance. PROGRESSING; NOT MET  5.  Patient will demonstrate ability to negotiate a flight of stairs with reciprocal pattern, no handrail and no compensation or symptoms. PROGRESSING; NOT MET    PLAN     Cont with POC per ACL quad tendon protocol  Currently phase IV (squats/leg press 0-60 degree, single leg stance/stability exercises)  BFR PRN  Assess response to dry needling    Shan Dolan, PT

## 2023-04-19 ENCOUNTER — CLINICAL SUPPORT (OUTPATIENT)
Dept: REHABILITATION | Facility: HOSPITAL | Age: 38
End: 2023-04-19
Payer: MEDICAID

## 2023-04-19 DIAGNOSIS — M62.81 QUADRICEPS WEAKNESS: ICD-10-CM

## 2023-04-19 DIAGNOSIS — Z74.09 DECREASED FUNCTIONAL MOBILITY AND ENDURANCE: Primary | ICD-10-CM

## 2023-04-19 PROCEDURE — 97110 THERAPEUTIC EXERCISES: CPT | Mod: PN | Performed by: PHYSICAL THERAPIST

## 2023-04-19 NOTE — PROGRESS NOTES
[facility]   Physical Therapy Treatment Note     Name: Terri Summers  Clinic Number: [id number]    Therapy Diagnosis:   Encounter Diagnoses   Name Primary?    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    *PRECAUTION ? Graft at weakest point during this period.  No impact loading activities such as  jumping, running, pivoting, or cutting    Time In: 8:11 AM  Time Out: 9:02 AM  Total Billable Time: 48 minutes (TEx3)    SUBJECTIVE     Pt reports: she felt 100% better after dry needling last visit. She still feels a lot better.    She was compliant with her home exercise program.    Response to previous treatment: no problems.  Functional change: no longer using any AD or braces    Pain: 3/10  Location: right knee      OBJECTIVE     See re-assessment on 12/16/22    Date of surgery: 12/15/22 by Dr. Crystal  Procedure:  Right ACL reconstruction with quadriceps autograft   Right all-inside lateral meniscus repair  Right medial quadriceps tendon reconstruction with semitendinosis allograft         Treatment     Terri received the treatments listed below      Manual therapy for 00 minutes including:  Effleurage to right knee  STM to quad, distal adductors, calf, and IT band    therapeutic exercises to develop strength, endurance, ROM, flexibility, posture, and core  "stabilization for minutes 44 including testing:    NUSTEP: 6 minutes sprints level 3 for improved cardiovascular endurance    Blood flow resistance trainin% LOP (UOP: 220 mm Hg; 70% = 154mmHg) with autoregulate setting and large cuff with following exercises performing 30, 15, 15, 15 with 30 second rest breaks between sets   Straight leg raise  Long arc quad: 1 lb      Single leg bridges: 2x10 each  Bridge walkouts: 10x  Side lying hip abduction: 3x15 (gluteus medius concentration)    Leg press: 3x15, 5 plates (0-80 degrees) double leg [inc to 7 plates next]  Right leg press: 2x15 2.5 plates (0-80 degree) [inc to 3.5 plates next]    Lateral step downs: 4" step 3x8, right  Step ups: @1st step:  3x10 (slight contralateral march)    Not today:  Dry needling to right knee with e-stim to pain reduction  Prone hip extension + abduction: 2x15, right    Squats: 30x, and 2 rounds of 15x    Right single leg Bridges 2x15 right with 10# dumbbell hold  4 medium hurdles: small yellow 3 laps step-to; 3 laps reciprocal; 3 laps side stepping; 3 laps backwards  Right heel raise: 2x10  Right eccentric hamstring curl machine (down 2, up 1): 3x10 4 plates  Forward step downs: 2x10 right, 4 inch step - not today  Toe tap behind, heel tap in front: 20x each (simulating taking a step)  Single leg stance with trampoline ball toss: 1 minutes   High knee marches on foam: 1 minutes   C sweeps: 2x10 right, green theraband foam   Split squats: 3x8 right  BOSU stepovers (round side up): 20x each  Lateral step to pause on BOSU (round side up): 20x each  Lateral walks: yellow theraband in // bars 3 laps  F[employer] First Balance Board:Static Balance (A/P) (S/S) 2x1' ea  BOSU stance (round side down): 2x1  minutes    Ice applied to right knee for 00 minutes in supine for pain relief and decrease in swelling.    Patient Education and Home Exercises     Home Exercises Provided and Patient Education Provided     Education provided:   - use of and " fitting of hinge brace  - benefits of NMES and TENS - units to purchase  - blood flow restriction therapy  - sitting with brace unlocked for a little at home  - risks and benefits of dry needling - signed consent on 4/17/23    Written Home Exercises Provided: yes. Exercises were reviewed and Terri was able to demonstrate them prior to the end of the session.  Terri demonstrated good  understanding of the education provided. See EMR under Patient Instructions for exercises provided during therapy sessions    ASSESSMENT     Terri arrives status post ACL repair with quad tendon autograph and MQTFL reconstruction with semitendinosis allograph. Significant reduction in pain reported immediately after dry needling with continued improvements upon arrival. Performed BFR resistance training to build strength under lower loads. She reports no symptoms with leg press for first time, even compared to prior to her fall. Plan to continue progressive loading.      Pt prognosis is Good.     Pt will continue to benefit from skilled outpatient physical therapy to address the deficits listed in the problem list box on initial evaluation, provide pt/family education and to maximize pt's level of independence in the home and community environment.     Pt's spiritual, cultural and educational needs considered and pt agreeable to plan of care and goals.     Anticipated barriers to physical therapy: chronic nature of symptoms    Goals:   Short Term Goals: 6 weeks   1.  Patient will demonstrate right knee ROM at least 5-0-120 degree. MET  2.  Patient will demonstrate right quad strength by performing 3x10 of active straight leg raise without lag.  MET  3.  Patient will report worst right knee pain < 4/10 to improve ADL performance. MET     Long Term Goals: 16 weeks   1.  Patient will demonstrate ability to perform > 12 sit <> stand transfers w/o UE use for improved ability to stand from low surfaces. PROGRESSING; NOT MET  2.  Patient will  demonstrate right quad strength via MicroFET of > 27 kg to improve transfers, gait and ADL performance. PROGRESSING; NOT MET  3.  Patient will demonstrate to PT comprehensive understanding of each HEP component without verbal cueing. PROGRESSING; NOT MET  4.  Patient will improve FOTO to < 43% to improve ADL performance. PROGRESSING; NOT MET  5.  Patient will demonstrate ability to negotiate a flight of stairs with reciprocal pattern, no handrail and no compensation or symptoms. PROGRESSING; NOT MET    PLAN     Cont with POC per ACL quad tendon protocol  Currently phase IV (squats/leg press 0-60 degree, single leg stance/stability exercises)  BFR PRN  dry needling PRN    Shan Dolan, PT

## 2023-04-22 ENCOUNTER — HOSPITAL ENCOUNTER (OUTPATIENT)
Dept: RADIOLOGY | Facility: HOSPITAL | Age: 38
Discharge: HOME | End: 2023-04-22
Attending: ORTHOPAEDIC SURGERY
Payer: MEDICAID

## 2023-04-22 DIAGNOSIS — Z98.890 S/P LATERAL MENISCUS REPAIR OF RIGHT KNEE: ICD-10-CM

## 2023-04-22 DIAGNOSIS — S83.004D: ICD-10-CM

## 2023-04-22 DIAGNOSIS — Z98.890 S/P ACL RECONSTRUCTION: ICD-10-CM

## 2023-04-22 PROCEDURE — 73721 MRI JNT OF LWR EXTRE W/O DYE: CPT | Mod: 26,RT,, | Performed by: RADIOLOGY

## 2023-04-22 PROCEDURE — 73721 MRI KNEE WITHOUT CONTRAST RIGHT: ICD-10-PCS | Mod: 26,RT,, | Performed by: RADIOLOGY

## 2023-04-22 PROCEDURE — 73721 MRI JNT OF LWR EXTRE W/O DYE: CPT | Mod: TC,RT

## 2023-04-24 ENCOUNTER — CLINICAL SUPPORT (OUTPATIENT)
Dept: REHABILITATION | Facility: HOSPITAL | Age: 38
End: 2023-04-24
Payer: MEDICAID

## 2023-04-24 DIAGNOSIS — Z74.09 DECREASED FUNCTIONAL MOBILITY AND ENDURANCE: Primary | ICD-10-CM

## 2023-04-24 DIAGNOSIS — M62.81 QUADRICEPS WEAKNESS: ICD-10-CM

## 2023-04-24 PROCEDURE — 97110 THERAPEUTIC EXERCISES: CPT | Mod: PN | Performed by: PHYSICAL THERAPIST

## 2023-04-24 NOTE — PROGRESS NOTES
[facility]   Physical Therapy Treatment Note     Name: Terri Summers  Clinic Number: [id number]    Therapy Diagnosis:   Encounter Diagnoses   Name Primary?    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    *PRECAUTION ? Graft at weakest point during this period.  No impact loading activities such as  jumping, running, pivoting, or cutting    Time In: 8:00 AM  Time Out: 8:55 AM  Total Billable Time: 45 +10 unbilled e-stim minutes (TEx3)    SUBJECTIVE     Pt reports: pain started returning Thursday. The weekend was bad. She woke up today with only 4/10 pain.    She was compliant with her home exercise program.    Response to previous treatment: no problems.  Functional change: no longer using any AD or braces    Pain: 4/10  Location: right knee      OBJECTIVE     See re-assessment on 12/16/22    Date of surgery: 12/15/22 by Dr. Crystal  Procedure:  Right ACL reconstruction with quadriceps autograft   Right all-inside lateral meniscus repair  Right medial quadriceps tendon reconstruction with semitendinosis allograft         Treatment     Terri received the treatments listed below      Manual therapy for 10 minutes including:    Dry needling to right knee with e-stim to pain reduction    therapeutic exercises to develop strength, endurance, ROM, flexibility, posture,  "and core stabilization for minutes 35 including testing:    NUSTEP: 6 minutes sprints level 3 for improved cardiovascular endurance    Blood flow resistance trainin% LOP (UOP: 220 mm Hg; 70% = 154mmHg) with autoregulate setting and large cuff with following exercises performing 30, 15, 15, 15 with 30 second rest breaks between sets   Straight leg raise  Long arc quad: 1 lb    Single leg bridges: 2x10 each  Bridge walkouts: 10x    Leg press: 3x15, 7 plates (0-80 degrees) double leg   Right leg press: 2x15 3.5 plates (0-80 degree)    Lateral step downs: 4" step 3x8, right  Step ups: @1st step:  3x10 (slight contralateral march)    Not today:  Side lying hip abduction: 3x15 (gluteus medius concentration)  Prone hip extension + abduction: 2x15, right    Squats: 30x, and 2 rounds of 15x    Right single leg Bridges 2x15 right with 10# dumbbell hold  4 medium hurdles: small yellow 3 laps step-to; 3 laps reciprocal; 3 laps side stepping; 3 laps backwards  Right heel raise: 2x10  Right eccentric hamstring curl machine (down 2, up 1): 3x10 4 plates  Forward step downs: 2x10 right, 4 inch step - not today  Toe tap behind, heel tap in front: 20x each (simulating taking a step)  Single leg stance with trampoline ball toss: 1 minutes   High knee marches on foam: 1 minutes   C sweeps: 2x10 right, green theraband foam   Split squats: 3x8 right  BOSU stepovers (round side up): 20x each  Lateral step to pause on BOSU (round side up): 20x each  Lateral walks: yellow theraband in // bars 3 laps  Fitter First Balance Board:Static Balance (A/P) (S/S) 2x1' ea  BOSU stance (round side down): 2x1  minutes    Ice applied to right knee for 00 minutes in supine for pain relief and decrease in swelling.    Patient Education and Home Exercises     Home Exercises Provided and Patient Education Provided     Education provided:   - use of and fitting of hinge brace  - benefits of NMES and TENS - units to purchase  - blood flow restriction " "therapy  - sitting with brace unlocked for a little at home  - risks and benefits of dry needling - signed consent on 4/17/23    Written Home Exercises Provided: yes. Exercises were reviewed and Terri was able to demonstrate them prior to the end of the session.  Terri demonstrated good  understanding of the education provided. See EMR under Patient Instructions for exercises provided during therapy sessions    ASSESSMENT     Terri arrives status post ACL repair with quad tendon autograph and MQTFL reconstruction with semitendinosis allograph. She had an MRI on Saturday, which came back negative. Able to increase load on leg press without increase in pain. Mild pain with lateral step down with difficulty that improved after rest breaks.    Terri was agreeable to dry needling by a certified dry needling PT and signed a consent prior to treatment after being made aware of risks. 2x60 mm needles were inserted into the vastus medialis, vastus lateralis; 2x25 mm needles in the medial and lateral "eye" of the patella; 2x30 mm needles in the tibialis anterior and peroneus longus. Periosteal pecking and winding for grasp were performed. Electrical stimulation was applied to these needles at 4 Hz and a pulse of 250 µseconds for 10 minutes with a penitentiary check. Afterwards, needles were removed and placed into a sharps container. Terri reported a good response to treatment.       Pt prognosis is Good.     Pt will continue to benefit from skilled outpatient physical therapy to address the deficits listed in the problem list box on initial evaluation, provide pt/family education and to maximize pt's level of independence in the home and community environment.     Pt's spiritual, cultural and educational needs considered and pt agreeable to plan of care and goals.     Anticipated barriers to physical therapy: chronic nature of symptoms    Goals:   Short Term Goals: 6 weeks   1.  Patient will demonstrate right knee ROM at least " 5-0-120 degree. MET  2.  Patient will demonstrate right quad strength by performing 3x10 of active straight leg raise without lag.  MET  3.  Patient will report worst right knee pain < 4/10 to improve ADL performance. MET     Long Term Goals: 16 weeks   1.  Patient will demonstrate ability to perform > 12 sit <> stand transfers w/o UE use for improved ability to stand from low surfaces. PROGRESSING; NOT MET  2.  Patient will demonstrate right quad strength via MicroFET of > 27 kg to improve transfers, gait and ADL performance. PROGRESSING; NOT MET  3.  Patient will demonstrate to PT comprehensive understanding of each HEP component without verbal cueing. PROGRESSING; NOT MET  4.  Patient will improve FOTO to < 43% to improve ADL performance. PROGRESSING; NOT MET  5.  Patient will demonstrate ability to negotiate a flight of stairs with reciprocal pattern, no handrail and no compensation or symptoms. PROGRESSING; NOT MET    PLAN     Cont with POC per ACL quad tendon protocol  Currently phase IV (squats/leg press 0-60 degree, single leg stance/stability exercises)  BFR PRN  dry needling PRN    Shan Dolan, PT

## 2023-04-27 ENCOUNTER — CLINICAL SUPPORT (OUTPATIENT)
Dept: REHABILITATION | Facility: HOSPITAL | Age: 38
End: 2023-04-27
Payer: MEDICAID

## 2023-04-27 DIAGNOSIS — M62.81 QUADRICEPS WEAKNESS: ICD-10-CM

## 2023-04-27 DIAGNOSIS — Z74.09 DECREASED FUNCTIONAL MOBILITY AND ENDURANCE: Primary | ICD-10-CM

## 2023-04-27 PROCEDURE — 97110 THERAPEUTIC EXERCISES: CPT | Mod: PN | Performed by: PHYSICAL THERAPIST

## 2023-04-27 NOTE — PROGRESS NOTES
"[facility]   Physical Therapy Treatment Note     Name: Terri Summers  Clinic Number: [id number]    Therapy Diagnosis:   Encounter Diagnoses   Name Primary?    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    *PRECAUTION ? Graft at weakest point during this period.  No impact loading activities such as  jumping, running, pivoting, or cutting    Time In: 8:02 AM  Time Out: 9:00 AM  Total Billable Time: 45 +10 unbilled e-stim minutes (TEx3)    SUBJECTIVE     Pt reports: "It didn't work as well." Pain returned later that day. She has an appointment with Dr. Crystal tomorrow.    She was compliant with her home exercise program.    Response to previous treatment: no problems.  Functional change: no longer using any AD or braces    Pain: 6/10  Location: right knee      OBJECTIVE     See re-assessment on 12/16/22    Date of surgery: 12/15/22 by Dr. Crystal  Procedure:  Right ACL reconstruction with quadriceps autograft   Right all-inside lateral meniscus repair  Right medial quadriceps tendon reconstruction with semitendinosis allograft       Treatment     Terri received the treatments listed below      Manual therapy for 10 minutes including:    Dry needling to right knee with e-stim to pain reduction    therapeutic exercises to develop strength, endurance, ROM, " "flexibility, posture, and core stabilization for minutes 35 including testing:    NUSTEP: 6 minutes sprints level 3 for improved cardiovascular endurance    Blood flow resistance trainin% LOP (UOP: 220 mm Hg; 70% = 154mmHg) with autoregulate setting and large cuff with following exercises performing 30, 15, 15, 15 with 30 second rest breaks between sets   Straight leg raise  Long arc quad: 1 lb    Single leg bridges: 2x10 each  Bridge walkouts: 10x    Leg press: 3x15, 7 plates (0-80 degrees) double leg   Right leg press: 2x15 3.5 plates (0-80 degree)    Lateral step downs: 4" step 3x8, right  Step ups: @1st step:  3x10 (slight contralateral march)  Good mornings: long wand 2x10 (add 3 pounds on each side)  C sweeps: 2x10 right stance leg    Not today:  Side lying hip abduction: 3x15 (gluteus medius concentration)  Prone hip extension + abduction: 2x15, right    Squats: 30x, and 2 rounds of 15x  Split squats: 3x8 right  4 medium hurdles: small yellow 3 laps step-to; 3 laps reciprocal; 3 laps side stepping; 3 laps backwards  Right heel raise: 2x10  Right eccentric hamstring curl machine (down 2, up 1): 3x10 4 plates  Forward step downs: 2x10 right, 4 inch step - not today  Toe tap behind, heel tap in front: 20x each (simulating taking a step)  Single leg stance with trampoline ball toss: 1 minutes   High knee marches on foam: 1 minutes    BOSU stepovers (round side up): 20x each  Lateral step to pause on BOSU (round side up): 20x each  Lateral walks: yellow theraband in // bars 3 laps  Fitter First Balance Board:Static Balance (A/P) (S/S) 2x1' ea  BOSU stance (round side down): 2x1  minutes    Ice applied to right knee for 00 minutes in supine for pain relief and decrease in swelling.    Patient Education and Home Exercises     Home Exercises Provided and Patient Education Provided     Education provided:   - use of and fitting of hinge brace  - benefits of NMES and TENS - units to purchase  - blood flow " "restriction therapy  - sitting with brace unlocked for a little at home  - risks and benefits of dry needling - signed consent on 4/17/23    Written Home Exercises Provided: yes. Exercises were reviewed and Terri was able to demonstrate them prior to the end of the session.  Terri demonstrated good  understanding of the education provided. See EMR under Patient Instructions for exercises provided during therapy sessions    ASSESSMENT     Terri arrives status post ACL repair with quad tendon autograph and MQTFL reconstruction with semitendinosis allograph. She had an MRI on Saturday, which came back negative. Pain slightly elevated today, which may be due to the weather. She was still able to complete exercises without any increase in pain.    Terri was agreeable to dry needling by a certified dry needling PT and signed a consent prior to treatment after being made aware of risks. 2x60 mm needles were inserted into the vastus medialis, vastus lateralis; 2x25 mm needles in the medial and lateral "eye" of the patella; 2x30 mm needles in the tibialis anterior and peroneus longus. Periosteal pecking and winding for grasp were performed. Electrical stimulation was applied to these needles at 4 Hz and a pulse of 250 µseconds for 10 minutes with a [facility] check. Afterwards, needles were removed and placed into a sharps container. Terri reported a good response to treatment with very minimal pain when leaving.       Pt prognosis is Good.     Pt will continue to benefit from skilled outpatient physical therapy to address the deficits listed in the problem list box on initial evaluation, provide pt/family education and to maximize pt's level of independence in the home and community environment.     Pt's spiritual, cultural and educational needs considered and pt agreeable to plan of care and goals.     Anticipated barriers to physical therapy: chronic nature of symptoms    Goals:   Short Term Goals: 6 weeks   1.  Patient will " demonstrate right knee ROM at least 5-0-120 degree. MET  2.  Patient will demonstrate right quad strength by performing 3x10 of active straight leg raise without lag.  MET  3.  Patient will report worst right knee pain < 4/10 to improve ADL performance. MET     Long Term Goals: 16 weeks   1.  Patient will demonstrate ability to perform > 12 sit <> stand transfers w/o UE use for improved ability to stand from low surfaces. PROGRESSING; NOT MET  2.  Patient will demonstrate right quad strength via MicroFET of > 27 kg to improve transfers, gait and ADL performance. PROGRESSING; NOT MET  3.  Patient will demonstrate to PT comprehensive understanding of each HEP component without verbal cueing. PROGRESSING; NOT MET  4.  Patient will improve FOTO to < 43% to improve ADL performance. PROGRESSING; NOT MET  5.  Patient will demonstrate ability to negotiate a flight of stairs with reciprocal pattern, no handrail and no compensation or symptoms. PROGRESSING; NOT MET    PLAN     Cont with POC per ACL quad tendon protocol  Currently phase IV (squats/leg press 0-60 degree, single leg stance/stability exercises)  BFR PRN  dry needling PRN    Shan Dolan, PT

## 2023-04-28 ENCOUNTER — OFFICE VISIT (OUTPATIENT)
Dept: ORTHOPEDICS | Facility: CLINIC | Age: 38
End: 2023-04-28
Payer: MEDICAID

## 2023-04-28 VITALS
DIASTOLIC BLOOD PRESSURE: 106 MMHG | BODY MASS INDEX: 29.23 KG/M2 | HEART RATE: 116 BPM | SYSTOLIC BLOOD PRESSURE: 154 MMHG | WEIGHT: 181.88 LBS | HEIGHT: 66 IN

## 2023-04-28 DIAGNOSIS — Z98.890 S/P ACL RECONSTRUCTION: Primary | ICD-10-CM

## 2023-04-28 DIAGNOSIS — S83.004D: ICD-10-CM

## 2023-04-28 DIAGNOSIS — Z98.890 S/P LATERAL MENISCUS REPAIR OF RIGHT KNEE: ICD-10-CM

## 2023-04-28 PROCEDURE — 3008F BODY MASS INDEX DOCD: CPT | Mod: CPTII,,, | Performed by: ORTHOPAEDIC SURGERY

## 2023-04-28 PROCEDURE — 99999 PR PBB SHADOW E&M-EST. PATIENT-LVL III: CPT | Mod: PBBFAC,,, | Performed by: ORTHOPAEDIC SURGERY

## 2023-04-28 PROCEDURE — 99213 OFFICE O/P EST LOW 20 MIN: CPT | Mod: PBBFAC,PN | Performed by: ORTHOPAEDIC SURGERY

## 2023-04-28 PROCEDURE — 20610 LARGE JOINT ASPIRATION/INJECTION: R KNEE: ICD-10-PCS | Mod: S$PBB,RT,, | Performed by: ORTHOPAEDIC SURGERY

## 2023-04-28 PROCEDURE — 3080F DIAST BP >= 90 MM HG: CPT | Mod: CPTII,,, | Performed by: ORTHOPAEDIC SURGERY

## 2023-04-28 PROCEDURE — 1160F PR REVIEW ALL MEDS BY PRESCRIBER/CLIN PHARMACIST DOCUMENTED: ICD-10-PCS | Mod: CPTII,,, | Performed by: ORTHOPAEDIC SURGERY

## 2023-04-28 PROCEDURE — 3080F PR MOST RECENT DIASTOLIC BLOOD PRESSURE >= 90 MM HG: ICD-10-PCS | Mod: CPTII,,, | Performed by: ORTHOPAEDIC SURGERY

## 2023-04-28 PROCEDURE — 20610 DRAIN/INJ JOINT/BURSA W/O US: CPT | Mod: PBBFAC,PN | Performed by: ORTHOPAEDIC SURGERY

## 2023-04-28 PROCEDURE — 1160F RVW MEDS BY RX/DR IN RCRD: CPT | Mod: CPTII,,, | Performed by: ORTHOPAEDIC SURGERY

## 2023-04-28 PROCEDURE — 99999 PR PBB SHADOW E&M-EST. PATIENT-LVL III: ICD-10-PCS | Mod: PBBFAC,,, | Performed by: ORTHOPAEDIC SURGERY

## 2023-04-28 PROCEDURE — 99214 OFFICE O/P EST MOD 30 MIN: CPT | Mod: 25,S$PBB,, | Performed by: ORTHOPAEDIC SURGERY

## 2023-04-28 PROCEDURE — 99214 PR OFFICE/OUTPT VISIT, EST, LEVL IV, 30-39 MIN: ICD-10-PCS | Mod: 25,S$PBB,, | Performed by: ORTHOPAEDIC SURGERY

## 2023-04-28 PROCEDURE — 3077F PR MOST RECENT SYSTOLIC BLOOD PRESSURE >= 140 MM HG: ICD-10-PCS | Mod: CPTII,,, | Performed by: ORTHOPAEDIC SURGERY

## 2023-04-28 PROCEDURE — 3008F PR BODY MASS INDEX (BMI) DOCUMENTED: ICD-10-PCS | Mod: CPTII,,, | Performed by: ORTHOPAEDIC SURGERY

## 2023-04-28 PROCEDURE — 1159F PR MEDICATION LIST DOCUMENTED IN MEDICAL RECORD: ICD-10-PCS | Mod: CPTII,,, | Performed by: ORTHOPAEDIC SURGERY

## 2023-04-28 PROCEDURE — 1159F MED LIST DOCD IN RCRD: CPT | Mod: CPTII,,, | Performed by: ORTHOPAEDIC SURGERY

## 2023-04-28 PROCEDURE — 3077F SYST BP >= 140 MM HG: CPT | Mod: CPTII,,, | Performed by: ORTHOPAEDIC SURGERY

## 2023-04-29 NOTE — PROGRESS NOTES
"Patient ID:   Terri Summers is a 37 y.o. female.    Chief Complaint:   4m 13d s/p R ACLR with quad autograft, MQTFL with hamstring allograft, lateral meniscus repair    HPI:   The patient is returning for evaluation of the right knee. She recently completed MRI scan of the knee. She reports continued pain and swelling in the knee. Most of the pain is medial over the knee.     Medications:    Current Outpatient Medications:     acetaminophen (TYLENOL) 325 MG tablet, Take 325 mg by mouth every 6 (six) hours as needed for Pain., Disp: , Rfl:     ALPRAZolam (XANAX) 0.5 MG tablet, Take 1 tablet (0.5 mg total) by mouth daily as needed for Anxiety., Disp: 30 tablet, Rfl: 5    dextroamphetamine-amphetamine (ADDERALL) 30 mg Tab, Take 1 tablet (30 mg total) by mouth 2 (two) times daily., Disp: 60 tablet, Rfl: 0    dextroamphetamine-amphetamine 30 mg Tab, Take 1 tablet (30 mg total) by mouth 2 (two) times daily., Disp: 60 tablet, Rfl: 0    dextroamphetamine-amphetamine 30 mg Tab, Take 1 tablet (30 mg total) by mouth 2 (two) times daily., Disp: 60 tablet, Rfl: 0    ergocalciferol (ERGOCALCIFEROL) 50,000 unit Cap, Take 50,000 Units by mouth every 7 days., Disp: , Rfl:     ibuprofen (ADVIL,MOTRIN) 800 MG tablet, TK 1 T PO BID WF PRF PAIN, Disp: , Rfl: 0    meloxicam (MOBIC) 15 MG tablet, Take 15 mg by mouth., Disp: , Rfl:     traMADoL (ULTRAM) 50 mg tablet, Take 1 tablet (50 mg total) by mouth every 6 (six) hours as needed for Pain., Disp: 20 tablet, Rfl: 0    Allergies:  Review of patient's allergies indicates:  No Known Allergies    Vitals:  BP (!) 154/106   Pulse (!) 116   Ht 5' 6" (1.676 m)   Wt 82.5 kg (181 lb 14.1 oz)   BMI 29.36 kg/m²     Physical Examination:  Ortho Exam   Right knee exam:  2+ effusion  Tenderness medial joint line  Lachmans 1A, negative pivot shift  2 quadrants of lateral patellar translation with solid endpoint.     Imaging Studies:  I have independently reviewed the following imaging studies " performed at [facility]:    MRI Knee Without Contrast Right  Narrative: EXAMINATION:  MRI KNEE WITHOUT CONTRAST RIGHT    CLINICAL HISTORY:  Knee trauma, internal derangement suspected, xray done;    TECHNIQUE:  Routine MRI evaluation of the right knee without contrast.    COMPARISON:  MRI 10/26/2022, radiograph 03/29/2023.    FINDINGS:  Menisci: Expected imaging appearance of the posterior horn of the lateral meniscus status post peripheral tear repair.  Medial meniscus demonstrates preserved morphology and signal intensity.  Intact anterior and posterior root ligaments.    Ligaments: Status post ACL reconstruction.  Intact graft with increased signal intensity likely related to evolving ligamentization.  Normal femoral and tibial tunnels.  Proximal aspect of the tibial interference screw appears slightly proud.  PCL, MCL and posterolateral corner structures are intact.    Extensor Mechanism: Thickening of the distal quadriceps tendon with increase signal heterogeneity of the central fibers in keeping with evolving postoperative change of ACL autograft harvesting site.  Intact MQTFL graft.  Patellar tendon is normal.  Shallow trochlear groove.  Patellofemoral alignment appears within normal limits.    Cartilage:    *Patellofemoral: Intact without partial or full-thickness defects.  No subchondral edema.  *Medial tibiofemoral: Intact without partial or full-thickness defects.  No subchondral edema.  *Lateral tibiofemoral: Known lateral tibial plateau chondral defect cannot be confirmed on this exam.  No new partial or full-thickness chondral defects.  No subchondral edema.  Bones: No fractures.  No avascular necrosis.  No marrow infiltrative process.    Miscellaneous: There is a large joint effusion.  No popliteal cyst.  Medial gastrocnemius, lateral gastrocnemius, distal semimembranosus and visualized pes anserine tendons are intact.  Visualized neurovascular structures appear within normal limits.  Impression: 1.  Intact ACL graft with increased signal intensity likely related to evolving ligamentization.  2. Evolving postoperative change of the distal quadriceps tendon autograft harvesting site. Intact MQTFL graft.  3. Expected imaging appearance of the posterior horn lateral meniscal repair.  No meniscal retear.  4. Joint effusion.    Electronically signed by: Henri Noel MD  Date:    04/22/2023  Time:    10:59    Assessment:  1. S/P ACL reconstruction    2. S/P lateral meniscus repair of right knee    3. Traumatic dislocation of patella, right, subsequent encounter      Plan:  I have reviewed the findings in detail with Mrs. Summers. The ACL graft is intact. I do not see any medial sided injury to explain the pain that is present. She continues to have a significant effusion which I think we should aspirate today. She wishes to proceed. She will continue PT and return in one month.        No follow-ups on file.

## 2023-04-29 NOTE — PROCEDURES
Large Joint Aspiration/Injection: R knee    Date/Time: 4/28/2023 1:45 PM  Performed by: Shan Crystal MD  Authorized by: Shan Crystal MD     Consent Done?:  Yes (Verbal)  Indications:  Joint swelling  Site marked: the procedure site was marked    Timeout: prior to procedure the correct patient, procedure, and site was verified    Prep: patient was prepped and draped in usual sterile fashion      Local anesthesia used?: Yes    Anesthesia:  Local infiltration  Local anesthetic:  Lidocaine 1% without epinephrine  Anesthetic total (ml):  8      Details:  Needle Size:  18 G  Ultrasonic Guidance for needle placement?: No    Approach:  Lateral  Location:  Knee  Site:  R knee  Aspirate amount (mL):  30  Aspirate:  Yellow  Patient tolerance:  Patient tolerated the procedure well with no immediate complications

## 2023-05-04 ENCOUNTER — CLINICAL SUPPORT (OUTPATIENT)
Dept: REHABILITATION | Facility: HOSPITAL | Age: 38
End: 2023-05-04
Payer: MEDICAID

## 2023-05-04 DIAGNOSIS — Z74.09 DECREASED FUNCTIONAL MOBILITY AND ENDURANCE: Primary | ICD-10-CM

## 2023-05-04 DIAGNOSIS — M62.81 QUADRICEPS WEAKNESS: ICD-10-CM

## 2023-05-04 PROCEDURE — 97110 THERAPEUTIC EXERCISES: CPT | Mod: PN | Performed by: PHYSICAL THERAPIST

## 2023-05-04 NOTE — PROGRESS NOTES
[facility]   Physical Therapy Treatment Note     Name: Terri Summers  Clinic Number: [id number]    Therapy Diagnosis:   Encounter Diagnoses   Name Primary?    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    *PRECAUTION ? Graft at weakest point during this period.  No impact loading activities such as  jumping, running, pivoting, or cutting    Time In: 8:08 AM  Time Out: 8:57 AM  Total Billable Time: 49  minutes (TEx3)    SUBJECTIVE     Pt reports: she got her knee drained again and was told to continue PT. She feels like the dry needling is helping and is feeling pretty good today.    She was compliant with her home exercise program.    Response to previous treatment: no problems.  Functional change: no longer using any AD or braces    Pain: 2/10  Location: right knee      OBJECTIVE     See re-assessment on 12/16/22    Date of surgery: 12/15/22 by Dr. Crystal  Procedure:  Right ACL reconstruction with quadriceps autograft   Right all-inside lateral meniscus repair  Right medial quadriceps tendon reconstruction with semitendinosis allograft     5/4/23  MicroFET:  Quad: right: 21 kg Left: 32.9 kg  Hamstring: right: 17.6 kg  left: 19.8 kg    Treatment     Terri received the treatments listed below      Manual therapy for 00 minutes including:    Dry needling to right  "knee with e-stim to pain reduction    therapeutic exercises to develop strength, endurance, ROM, flexibility, posture, and core stabilization for minutes 35 including testing:      Blood flow resistance trainin% LOP (UOP: 220 mm Hg; 70% = 154mmHg) with autoregulate setting and large cuff with following exercises performing 30, 15, 15, 15 with 30 second rest breaks between sets   Straight leg raise  Long arc quad: 2 lb    Single leg bridges: 2x10 each  Bridge walkouts: 10x    Leg press: 3x15, 7 plates (0-80 degrees) double leg   Right leg press: 2x8 4.5 plates (0-80 degree)    Split squats: 3x8 right forward  Lateral step downs: 4" step 3x8, right (mild trendelenberg)  Step ups: @1st step:  3x10 (slight contralateral march)  Good mornings: long wand 2x10 3 pounds on each side  C sweeps: 2x10 right stance leg  Single leg stance: 5x10" holds  Pistol squats: 25" table 15x right     Not today:  NUSTEP: 6 minutes sprints level 3 for improved cardiovascular endurance  Side lying hip abduction: 3x15 (gluteus medius concentration)  Prone hip extension + abduction: 2x15, right    Squats: 30x, and 2 rounds of 15x  4 medium hurdles: small yellow 3 laps step-to; 3 laps reciprocal; 3 laps side stepping; 3 laps backwards  Right heel raise: 2x10  Right eccentric hamstring curl machine (down 2, up 1): 3x10 4 plates  Forward step downs: 2x10 right, 4 inch step - not today  Toe tap behind, heel tap in front: 20x each (simulating taking a step)  Single leg stance with trampoline ball toss: 1 minutes   High knee marches on foam: 1 minutes    BOSU stepovers (round side up): 20x each  Lateral step to pause on BOSU (round side up): 20x each  Lateral walks: yellow theraband in // bars 3 laps  Fitter First Balance Board:Static Balance (A/P) (S/S) 2x1' ea  BOSU stance (round side down): 2x1  minutes    Ice applied to right knee for 00 minutes in supine for pain relief and decrease in swelling.    Patient Education and Home Exercises "     Home Exercises Provided and Patient Education Provided     Education provided:   - use of and fitting of hinge brace  - benefits of NMES and TENS - units to purchase  - blood flow restriction therapy  - sitting with brace unlocked for a little at home  - risks and benefits of dry needling - signed consent on 4/17/23    Written Home Exercises Provided: yes. Exercises were reviewed and Terri was able to demonstrate them prior to the end of the session.  Terri demonstrated good  understanding of the education provided. See EMR under Patient Instructions for exercises provided during therapy sessions    ASSESSMENT     Terri arrives status post ACL repair with quad tendon autograph and MQTFL reconstruction with semitendinosis allograph. Pain level low since dry needling and having her knee drained. Able to increase loading today without increased knee pain. Right quad strength 64% of left. Improving control with single leg activities.       Pt prognosis is Good.     Pt will continue to benefit from skilled outpatient physical therapy to address the deficits listed in the problem list box on initial evaluation, provide pt/family education and to maximize pt's level of independence in the home and community environment.     Pt's spiritual, cultural and educational needs considered and pt agreeable to plan of care and goals.     Anticipated barriers to physical therapy: chronic nature of symptoms    Goals:   Short Term Goals: 6 weeks   1.  Patient will demonstrate right knee ROM at least 5-0-120 degree. MET  2.  Patient will demonstrate right quad strength by performing 3x10 of active straight leg raise without lag.  MET  3.  Patient will report worst right knee pain < 4/10 to improve ADL performance. MET     Long Term Goals: 16 weeks   1.  Patient will demonstrate ability to perform > 12 sit <> stand transfers w/o UE use for improved ability to stand from low surfaces. PROGRESSING; NOT MET  2.  Patient will demonstrate  right quad strength via MicroFET of > 27 kg to improve transfers, gait and ADL performance. PROGRESSING; NOT MET  3.  Patient will demonstrate to PT comprehensive understanding of each HEP component without verbal cueing. PROGRESSING; NOT MET  4.  Patient will improve FOTO to < 43% to improve ADL performance. PROGRESSING; NOT MET  5.  Patient will demonstrate ability to negotiate a flight of stairs with reciprocal pattern, no handrail and no compensation or symptoms. PROGRESSING; NOT MET    PLAN     Cont with POC per ACL quad tendon protocol  Currently phase IV (squats/leg press 0-60 degree, single leg stance/stability exercises)  BFR PRN  dry needling PRN    Shan Dolan, PT

## 2023-05-05 ENCOUNTER — PATIENT MESSAGE (OUTPATIENT)
Dept: ORTHOPEDICS | Facility: CLINIC | Age: 38
End: 2023-05-05
Payer: MEDICAID

## 2023-05-05 RX ORDER — MELOXICAM 7.5 MG/1
7.5 TABLET ORAL DAILY
Qty: 28 TABLET | Refills: 0 | Status: SHIPPED | OUTPATIENT
Start: 2023-05-05 | End: 2023-06-06 | Stop reason: SDUPTHER

## 2023-05-08 ENCOUNTER — CLINICAL SUPPORT (OUTPATIENT)
Dept: REHABILITATION | Facility: HOSPITAL | Age: 38
End: 2023-05-08
Payer: MEDICAID

## 2023-05-08 DIAGNOSIS — M62.81 QUADRICEPS WEAKNESS: ICD-10-CM

## 2023-05-08 DIAGNOSIS — Z74.09 DECREASED FUNCTIONAL MOBILITY AND ENDURANCE: Primary | ICD-10-CM

## 2023-05-08 PROCEDURE — 97110 THERAPEUTIC EXERCISES: CPT | Mod: PN

## 2023-05-08 NOTE — PROGRESS NOTES
[facility]   Physical Therapy Treatment Note     Name: Terri Summers  Clinic Number: [id number]    Therapy Diagnosis:   Encounter Diagnoses   Name Primary?    Decreased functional mobility and endurance Yes    Quadriceps weakness      Physician: Shan Crystal MD    Visit Date: 5/8/2023    Physician Orders: PT Eval and Treat   Medical Diagnosis from Referral:   S83.004D (ICD-10-CM) - Traumatic dislocation of patella, right, subsequent encounter   S83.511A (ICD-10-CM) - Chronic rupture of ACL of right knee      Evaluation Date: 11/22/2022  Plan of Care Expiration: 6/9/23   Progress Note Due: 6/9/23  Visit # / Visits authorized: 18/20 (+16),   FOTO: 4/10  PTA Visit: 0/5    Precautions: standard, post-op ACL with quad tendon autograph protocol; NO LOADED FLEXION PAST 90 x 4 months; May unlock hinged knee brace and WBAT.    *PRECAUTION ? Graft at weakest point during this period.  No impact loading activities such as  jumping, running, pivoting, or cutting    Time In: 7:05 AM  Time Out: 8:00 AM  Total Billable Time: 55  minutes (TEx4)    SUBJECTIVE     Pt reports: doing better and last visit was the first time in a long time she felt sore in her muscle and not the knee. Did ok over the weekend, and did have to ice it a little bit.     She was compliant with her home exercise program.    Response to previous treatment: no problems.  Functional change: no longer using any AD or braces    Pain: 4/10  Location: right knee      OBJECTIVE     See re-assessment on 12/16/22    Date of surgery: 12/15/22 by Dr. Crystal  Procedure:  Right ACL reconstruction with quadriceps autograft   Right all-inside lateral meniscus repair  Right medial quadriceps tendon reconstruction with semitendinosis allograft     5/4/23  MicroFET:  Quad: right: 21 kg Left: 32.9 kg  Hamstring: right: 17.6 kg  left: 19.8 kg    Treatment     Terri received the treatments listed below      Manual therapy for 00 minutes  "including:    Dry needling to right knee with e-stim to pain reduction    therapeutic exercises to develop strength, endurance, ROM, flexibility, posture, and core stabilization for minutes 35 including testing:    Blood flow resistance trainin% LOP (UOP: 220 mm Hg; 70% = 154mmHg) with autoregulate setting and large cuff with following exercises performing 30, 15, 15, 15 with 30 second rest breaks between sets   Straight leg raise  Long arc quad: 2 lb    Single leg bridges: 2x10 each  Bridge walkouts: 10x    Leg press: 3x15, 7 plates (0-80 degrees) double leg   Right leg press: 2x8 4.5 plates (0-80 degree)    Split squats: 3x8 right forward  Lateral step downs: 4" step 3x8, right (mild trendelenberg)  Step ups: @1st step:  3x10 (slight contralateral march)  Good mornings: long wand 2x12 3 pounds on each side  C sweeps: 2x10 right stance leg  Single leg stance: 5x10" holds  Pistol squats: 25" table 15x right     Not today:  Squats: 30x, and 2 rounds of 15x  4 medium hurdles: small yellow 3 laps step-to; 3 laps reciprocal; 3 laps side stepping; 3 laps backwards  Right heel raise: 2x10  Right eccentric hamstring curl machine (down 2, up 1): 3x10 4 plates  Forward step downs: 2x10 right, 4 inch step - not today  Toe tap behind, heel tap in front: 20x each (simulating taking a step)  Single leg stance with trampoline ball toss: 1 minutes   High knee marches on foam: 1 minutes    BOSU stepovers (round side up): 20x each  Lateral step to pause on BOSU (round side up): 20x each  Lateral walks: yellow theraband in // bars 3 laps  F[employer] First Balance Board:Static Balance (A/P) (S/S) 2x1' ea  BOSU stance (round side down): 2x1  minutes    Ice applied to right knee for 00 minutes in supine for pain relief and decrease in swelling.    Patient Education and Home Exercises     Home Exercises Provided and Patient Education Provided   Education provided:   - use of and fitting of hinge brace  - benefits of NMES and TENS - " units to purchase  - blood flow restriction therapy  - sitting with brace unlocked for a little at home  - risks and benefits of dry needling - signed consent on 4/17/23    Written Home Exercises Provided: yes. Exercises were reviewed and Terri was able to demonstrate them prior to the end of the session.  Terri demonstrated good  understanding of the education provided. See EMR under Patient Instructions for exercises provided during therapy sessions    ASSESSMENT     Terri arrives status post ACL repair with quad tendon autograph and MQTFL reconstruction with semitendinosis allograph. Right quad strength 64% of left. Improving control with single leg activities.   Mild right patellar and distal quadricept pain with leg press and lateral step downs; pain tolerable and still 4/10. Gradually progressing loading activities. Pt reports still having discomfort sitting at work with feet flat on ground - has to move it frequently.     Pt prognosis is Good.     Pt will continue to benefit from skilled outpatient physical therapy to address the deficits listed in the problem list box on initial evaluation, provide pt/family education and to maximize pt's level of independence in the home and community environment.     Pt's spiritual, cultural and educational needs considered and pt agreeable to plan of care and goals.     Anticipated barriers to physical therapy: chronic nature of symptoms    Goals:   Short Term Goals: 6 weeks   1.  Patient will demonstrate right knee ROM at least 5-0-120 degree. MET  2.  Patient will demonstrate right quad strength by performing 3x10 of active straight leg raise without lag.  MET  3.  Patient will report worst right knee pain < 4/10 to improve ADL performance. MET     Long Term Goals: 16 weeks   1.  Patient will demonstrate ability to perform > 12 sit <> stand transfers w/o UE use for improved ability to stand from low surfaces. PROGRESSING; NOT MET  2.  Patient will demonstrate right quad  strength via MicroFET of > 27 kg to improve transfers, gait and ADL performance. PROGRESSING; NOT MET  3.  Patient will demonstrate to PT comprehensive understanding of each HEP component without verbal cueing. PROGRESSING; NOT MET  4.  Patient will improve FOTO to < 43% to improve ADL performance. PROGRESSING; NOT MET  5.  Patient will demonstrate ability to negotiate a flight of stairs with reciprocal pattern, no handrail and no compensation or symptoms. PROGRESSING; NOT MET    PLAN     Cont with POC per ACL quad tendon protocol  Currently phase IV (squats/leg press 0-60 degree, single leg stance/stability exercises)  BFR PRN  dry needling PRN    Darius Moore, PT

## 2023-05-10 ENCOUNTER — CLINICAL SUPPORT (OUTPATIENT)
Dept: REHABILITATION | Facility: HOSPITAL | Age: 38
End: 2023-05-10
Payer: MEDICAID

## 2023-05-10 DIAGNOSIS — Z74.09 DECREASED FUNCTIONAL MOBILITY AND ENDURANCE: Primary | ICD-10-CM

## 2023-05-10 DIAGNOSIS — M62.81 QUADRICEPS WEAKNESS: ICD-10-CM

## 2023-05-10 PROCEDURE — 97110 THERAPEUTIC EXERCISES: CPT | Mod: PN

## 2023-05-10 NOTE — PROGRESS NOTES
[facility]   Physical Therapy Treatment Note     Name: Terri Summers  Clinic Number: [id number]    Therapy Diagnosis:   Encounter Diagnoses   Name Primary?    Decreased functional mobility and endurance Yes    Quadriceps weakness      Physician: Shan Crystal MD    Visit Date: 5/10/2023  Physician Orders: PT Eval and Treat   Medical Diagnosis from Referral:   S83.004D (ICD-10-CM) - Traumatic dislocation of patella, right, subsequent encounter   S83.511A (ICD-10-CM) - Chronic rupture of ACL of right knee   Evaluation Date: 11/22/2022  Plan of Care Expiration: 6/9/23   Progress Note Due: 6/9/23  Visit # / Visits authorized: 19/20 (+16),   FOTO: 5/10  PTA Visit: 0/5    Precautions: standard, post-op ACL with quad tendon autograph protocol; NO LOADED FLEXION PAST 90 x 4 months; May unlock hinged knee brace and WBAT.    *PRECAUTION ? Graft at weakest point during this period.  No impact loading activities such as  jumping, running, pivoting, or cutting    Time In: 8:05 AM  Time Out: 9:00 AM  Total Billable Time: 55 minutes (TEx4) - Medicaid     SUBJECTIVE     Pt reports: felt good after last visit and minimal soreness. Still sore at work sitting for long periods of time.     She was compliant with her home exercise program.    Response to previous treatment: no problems.  Functional change: no longer using any AD or braces    Pain: 2/10  Location: right knee      OBJECTIVE     See re-assessment on 12/16/22    Date of surgery: 12/15/22 by Dr. Crystal  Procedure:  Right ACL reconstruction with quadriceps autograft   Right all-inside lateral meniscus repair  Right medial quadriceps tendon reconstruction with semitendinosis allograft     5/4/23  MicroFET:  Quad: right: 21 kg Left: 32.9 kg  Hamstring: right: 17.6 kg  left: 19.8 kg    Treatment     Terri received the treatments listed below      Manual therapy for 00 minutes including:    Dry needling to right knee with e-stim to pain  "reduction    therapeutic exercises to develop strength, endurance, ROM, flexibility, posture, and core stabilization for minutes 55 including testing:    Blood flow resistance trainin% LOP (UOP: 220 mm Hg; 70% = 154mmHg) with autoregulate setting and large cuff with following exercises performing 30, 15, 15, 15 with 30 second rest breaks between sets   Straight leg raise: 1.5#  Long arc quad: 3#      C sweeps: 2x12 right stance leg  Right heel raise: 3x8  Split squats: 3x8 right forward  Deadlifts: 2x15 15# kettle bell  Lateral step downs: 4" step 3x8, right  Standing hip abduction on airex: 2x15 Bilaterally    Bridge walkouts: 10x  Leg press: 3x15, 7 plates (0-80 degrees) double leg   Right leg press: 2x8 4.5 plates (0-80 degree)  Shuttle sidelying hip abduction: trial next    Not today:  Single leg stance on BOSU ball: 5x10" holds (round side up)  Pistol squats: 25" table 15x right  Right eccentric hamstring curl machine (down 2, up 1): 3x10 4 plates  Lateral walks: yellow theraband in // bars 3 laps    Ice applied to right knee for 00 minutes in supine for pain relief and decrease in swelling.    Patient Education and Home Exercises     Home Exercises Provided and Patient Education Provided   Education provided:   - use of and fitting of hinge brace  - benefits of NMES and TENS - units to purchase  - blood flow restriction therapy  - sitting with brace unlocked for a little at home  - risks and benefits of dry needling - signed consent on 23    Written Home Exercises Provided: yes. Exercises were reviewed and Terri was able to demonstrate them prior to the end of the session.  Terri demonstrated good  understanding of the education provided. See EMR under Patient Instructions for exercises provided during therapy sessions    ASSESSMENT     Terri arrives status post ACL repair with quad tendon autograph and MQTFL reconstruction with semitendinosis allograph. Right quad strength 64% of left. Improving " control with single leg activities.   Mild soreness that did not exceed 3/10 today; tender to palpation to left femoral condyle and MCL. Made sure to give RLE breaks in between exercises by working other muscle groups, and 1-2 non-weight bearing breaks. She still reports right knee will buckle slightly on her throughout the day and night pain that will wake her for moments in time (does better sleeping on left side). Re-check right knee hyperextension next visit.    Pt prognosis is Good.     Pt will continue to benefit from skilled outpatient physical therapy to address the deficits listed in the problem list box on initial evaluation, provide pt/family education and to maximize pt's level of independence in the home and community environment.     Pt's spiritual, cultural and educational needs considered and pt agreeable to plan of care and goals.     Anticipated barriers to physical therapy: chronic nature of symptoms    Goals:   Short Term Goals: 6 weeks   1.  Patient will demonstrate right knee ROM at least 5-0-120 degree. MET  2.  Patient will demonstrate right quad strength by performing 3x10 of active straight leg raise without lag.  MET  3.  Patient will report worst right knee pain < 4/10 to improve ADL performance. MET     Long Term Goals: 16 weeks   1.  Patient will demonstrate ability to perform > 12 sit <> stand transfers w/o UE use for improved ability to stand from low surfaces. PROGRESSING; NOT MET  2.  Patient will demonstrate right quad strength via MicroFET of > 27 kg to improve transfers, gait and ADL performance. PROGRESSING; NOT MET  3.  Patient will demonstrate to PT comprehensive understanding of each HEP component without verbal cueing. PROGRESSING; NOT MET  4.  Patient will improve FOTO to < 43% to improve ADL performance. PROGRESSING; NOT MET  5.  Patient will demonstrate ability to negotiate a flight of stairs with reciprocal pattern, no handrail and no compensation or symptoms.  PROGRESSING; NOT MET    PLAN     Cont with POC per ACL quad tendon protocol  Currently phase IV (squats/leg press 0-60 degree, single leg stance/stability exercises)  BFR PRN  dry needling PRN    Darius Moore, PT

## 2023-05-15 ENCOUNTER — OFFICE VISIT (OUTPATIENT)
Dept: PSYCHIATRY | Facility: CLINIC | Age: 38
End: 2023-05-15
Payer: MEDICAID

## 2023-05-15 ENCOUNTER — CLINICAL SUPPORT (OUTPATIENT)
Dept: REHABILITATION | Facility: HOSPITAL | Age: 38
End: 2023-05-15
Payer: MEDICAID

## 2023-05-15 DIAGNOSIS — Z74.09 DECREASED FUNCTIONAL MOBILITY AND ENDURANCE: Primary | ICD-10-CM

## 2023-05-15 DIAGNOSIS — F90.2 ADHD (ATTENTION DEFICIT HYPERACTIVITY DISORDER), COMBINED TYPE: ICD-10-CM

## 2023-05-15 DIAGNOSIS — F41.0 GENERALIZED ANXIETY DISORDER WITH PANIC ATTACKS: Primary | ICD-10-CM

## 2023-05-15 DIAGNOSIS — F41.1 GENERALIZED ANXIETY DISORDER WITH PANIC ATTACKS: Primary | ICD-10-CM

## 2023-05-15 DIAGNOSIS — M62.81 QUADRICEPS WEAKNESS: ICD-10-CM

## 2023-05-15 PROCEDURE — 99213 OFFICE O/P EST LOW 20 MIN: CPT | Mod: SA,HB,95, | Performed by: NURSE PRACTITIONER

## 2023-05-15 PROCEDURE — 99213 PR OFFICE/OUTPT VISIT, EST, LEVL III, 20-29 MIN: ICD-10-PCS | Mod: SA,HB,95, | Performed by: NURSE PRACTITIONER

## 2023-05-15 PROCEDURE — 97110 THERAPEUTIC EXERCISES: CPT | Mod: PN | Performed by: PHYSICAL THERAPIST

## 2023-05-15 RX ORDER — DEXTROAMPHETAMINE SACCHARATE, AMPHETAMINE ASPARTATE, DEXTROAMPHETAMINE SULFATE AND AMPHETAMINE SULFATE 7.5; 7.5; 7.5; 7.5 MG/1; MG/1; MG/1; MG/1
30 TABLET ORAL 2 TIMES DAILY
Qty: 60 TABLET | Refills: 0 | Status: SHIPPED | OUTPATIENT
Start: 2023-05-21 | End: 2023-08-08 | Stop reason: SDUPTHER

## 2023-05-15 RX ORDER — DEXTROAMPHETAMINE SACCHARATE, AMPHETAMINE ASPARTATE, DEXTROAMPHETAMINE SULFATE AND AMPHETAMINE SULFATE 7.5; 7.5; 7.5; 7.5 MG/1; MG/1; MG/1; MG/1
30 TABLET ORAL 2 TIMES DAILY
Qty: 60 TABLET | Refills: 0 | Status: SHIPPED | OUTPATIENT
Start: 2023-07-20 | End: 2023-08-08 | Stop reason: SDUPTHER

## 2023-05-15 RX ORDER — DEXTROAMPHETAMINE SACCHARATE, AMPHETAMINE ASPARTATE, DEXTROAMPHETAMINE SULFATE AND AMPHETAMINE SULFATE 7.5; 7.5; 7.5; 7.5 MG/1; MG/1; MG/1; MG/1
30 TABLET ORAL 2 TIMES DAILY
Qty: 60 TABLET | Refills: 0 | Status: SHIPPED | OUTPATIENT
Start: 2023-06-20 | End: 2023-08-08 | Stop reason: SDUPTHER

## 2023-05-15 NOTE — PROGRESS NOTES
Outpatient Psychiatry Follow-Up Visit (MD/NP)    5/15/2023    Clinical Status of Patient:  Outpatient (Ambulatory)    Chief Complaint:  Terri Summers is a 37 y.o. female who presents today for follow-up of attention problems.  Met with patient.      Last visit was:2/20/23  The patient location is: home  The chief complaint leading to consultation is: attention problems    Visit type: audiovisual    Face to Face time with patient: 30  30 minutes of total time spent on the encounter, which includes face to face time and non-face to face time preparing to see the patient (eg, review of tests), Obtaining and/or reviewing separately obtained history, Documenting clinical information in the electronic or other health record, Independently interpreting results (not separately reported) and communicating results to the patient/family/caregiver, or Care coordination (not separately reported).     Each patient to whom he or she provides medical services by telemedicine is:  (1) informed of the relationship between the physician and patient and the respective role of any other health care provider with respect to management of the patient; and (2) notified that he or she may decline to receive medical services by telemedicine and may withdraw from such care at any time.    Interval History and Content of Current Session:  Current Psychiatric Medications/changes  Continue Adderall 30 mg po BID  Continue Xanax 0.5 mg po daily PRN panic attacks    Virtual Visit: Pt presents bright affect and euthymic mood. Reports improvement in anxiety and ADH symptoms with current medications. Reports that she is doing well. Denies SI/HI/AVH. Will continue meds    Psychotherapy:  Target symptoms: distractability, lack of focus  Why chosen therapy is appropriate versus another modality: relevant to diagnosis  Outcome monitoring methods: self-report  Therapeutic intervention type: insight oriented psychotherapy  Topics discussed/themes: building  skills sets for symptom management, symptom recognition  The patient's response to the intervention is accepting. The patient's progress toward treatment goals is good.   Duration of intervention: 14 minutes.    Review of Systems   PSYCHIATRIC: Pertinant items are noted in the narrative.  CONSTITUTIONAL: No weight gain or loss.   MUSCULOSKELETAL: No pain or stiffness of the joints.  NEUROLOGIC: No weakness, sensory changes, seizures, confusion, memory loss, tremor or other abnormal movements.  ENDOCRINE: No polydipsia or polyuria.  INTEGUMENTARY: No rashes or lacerations.  EYES: No exophthalmos, jaundice or blindness.  ENT: No dizziness, tinnitus or hearing loss.  RESPIRATORY: No shortness of breath.  CARDIOVASCULAR: No tachycardia or chest pain.  GASTROINTESTINAL: No nausea, vomiting, pain, constipation or diarrhea.  GENITOURINARY: No frequency, dysuria or sexual dysfunction.  HEMATOLOGIC/LYMPHATIC: No excessive bleeding, prolonged or excessive bleeding after dental extraction/injury.  ALLERGIC/IMMUNOLOGIC: No allergic response to materials, foods or animals at this time.    Past Medical, Family and Social History: The patient's past medical, family and social history have been reviewed and updated as appropriate within the electronic medical record - see encounter notes.    Compliance: no    Side effects: None    Risk Parameters:  Patient reports no suicidal ideation  Patient reports no homicidal ideation  Patient reports no self-injurious behavior  Patient reports no violent behavior    Exam (detailed: at least 9 elements; comprehensive: all 15 elements)   Constitutional  Vitals:  Most recent vital signs, dated greater than 90 days prior to this appointment, were not reviewed.   There were no vitals filed for this visit.     General:  unremarkable, age appropriate     Musculoskeletal  Muscle Strength/Tone:  no tremor   Gait & Station:  non-ataxic     Psychiatric  Speech:  no latency; no press   Mood & Affect:   steady  congruent and appropriate   Thought Process:  normal and logical   Associations:  intact   Thought Content:  normal, no suicidality, no homicidality, delusions, or paranoia   Insight:  intact   Judgement: behavior is adequate to circumstances   Orientation:  grossly intact   Memory: intact for content of interview   Language: grossly intact   Attention Span & Concentration:  able to focus   Fund of Knowledge:  intact and appropriate to age and level of education     Assessment and Diagnosis   Status/Progress: Based on the examination today, the patient's problem(s) is/are improved.  New problems have not been presented today.   Co-morbidities and Lack of compliance are not complicating management of the primary condition.  There are no active rule-out diagnoses for this patient at this time.     General Impression:       ICD-10-CM ICD-9-CM   1. Generalized anxiety disorder with panic attacks  F41.1 300.02    F41.0 300.01   2. ADHD (attention deficit hyperactivity disorder), combined type  F90.2 314.01       Intervention/Counseling/Treatment Plan   Medication Management: The risks and benefits of medication were discussed with the patient.  Continue Adderall 30 mg po BID  Continue Xanax 0.5 mg po daily PRN panic attacks    Return to Clinic: 3 months    Risks, benefits, side effects and alternative treatments discussed with patient. Patient agrees with the current plan as documented.  Encouraged Patient to keep future appointments.  Take medications as prescribed and abstain from substance abuse.  Pt to present to ED for thoughts to harm herself or others

## 2023-05-15 NOTE — PROGRESS NOTES
[facility]   Physical Therapy Treatment Note     Name: Terri Summers  Clinic Number: [id number]    Therapy Diagnosis:   Encounter Diagnoses   Name Primary?    Decreased functional mobility and endurance Yes    Quadriceps weakness      Physician: Shan Crystal MD    Visit Date: 5/15/2023  Physician Orders: PT Eval and Treat   Medical Diagnosis from Referral:   S83.004D (ICD-10-CM) - Traumatic dislocation of patella, right, subsequent encounter   S83.511A (ICD-10-CM) - Chronic rupture of ACL of right knee   Evaluation Date: 11/22/2022  Plan of Care Expiration: 6/9/23   Progress Note Due: 6/9/23  Visit # / Visits authorized: 20/20 (+16),   FOTO: 5/10  PTA Visit: 0/5    Precautions: standard, post-op ACL with quad tendon autograph protocol; NO LOADED FLEXION PAST 90 x 4 months; May unlock hinged knee brace and WBAT.    *PRECAUTION ? Graft at weakest point during this period.  No impact loading activities such as  jumping, running, pivoting, or cutting    Time In: 8:00 AM  Time Out: 8:55 AM  Total Billable Time: 54 minutes (TEx4) - Medicaid     SUBJECTIVE     Pt reports: she has less pain. Her knee still gemini when she gets up after sitting a while, but it's happening less.     She was compliant with her home exercise program.    Response to previous treatment: no problems.  Functional change: no longer using any AD or braces    Pain: 2/10  Location: right knee      OBJECTIVE     See re-assessment on 12/16/22    Date of surgery: 12/15/22 by Dr. Crystal  Procedure:  Right ACL reconstruction with quadriceps autograft   Right all-inside lateral meniscus repair  Right medial quadriceps tendon reconstruction with semitendinosis allograft     5/4/23  MicroFET:  Quad: right: 21 kg Left: 32.9 kg  Hamstring: right: 17.6 kg  left: 19.8 kg    Treatment     Terri received the treatments listed below      Manual therapy for 00 minutes including:    Dry needling to right knee with e-stim to pain  "reduction    therapeutic exercises to develop strength, endurance, ROM, flexibility, posture, and core stabilization for minutes 55 including testing:    Blood flow resistance trainin% LOP (UOP: 220 mm Hg; 70% = 154mmHg) with autoregulate setting and large cuff with following exercises performing 30, 15, 15, 15 with 30 second rest breaks between sets   Straight leg raise: 1.5#  Long arc quad: 3#      C sweeps: 2x12 right stance leg  Right heel raise: 3x8  Split squats: 3x10 right forward  Deadlifts: 2x15 15# kettle bell  Lateral step downs: 4" step 3x8, right  Standing hip abduction on airex: 2x15 Bilaterally - not today  Single leg stance figure 8: 10x    Bridge walkouts: 12x  Leg press: 3x15, 8 plates, seat 5 (0-80 degrees) double leg   Right leg press: 2x8 4.5 plates (0-80 degree)  Shuttle sidelying hip abduction: 1 low cord, 1.5 upper cord 2x10  Pistol squats: 25" table 8x right; 24" table 8x; 23" table 5x    Not today:  Single leg stance on BOSU ball: 5x10" holds (round side up)  Right eccentric hamstring curl machine (down 2, up 1): 3x10 4 plates  Lateral walks: yellow theraband in // bars 3 laps    Ice applied to right knee for 00 minutes in supine for pain relief and decrease in swelling.    Patient Education and Home Exercises     Home Exercises Provided and Patient Education Provided   Education provided:   - use of and fitting of hinge brace  - benefits of NMES and TENS - units to purchase  - blood flow restriction therapy  - sitting with brace unlocked for a little at home  - risks and benefits of dry needling - signed consent on 23    Written Home Exercises Provided: yes. Exercises were reviewed and Terri was able to demonstrate them prior to the end of the session.  Terri demonstrated good  understanding of the education provided. See EMR under Patient Instructions for exercises provided during therapy sessions    ASSESSMENT     Terri arrives status post ACL repair with quad tendon autograph " and MQTFL reconstruction with semitendinosis allograph. Right quad strength 64% of left. Improving control with single leg activities.   Continue to alternate muscle groups worked throughout session with mild progressions able to be made in resistances and no increase in mild pain. Significant right trendelenberg with gait, so continuing to progress lateral hip strengthening and stability exercises.    Pt prognosis is Good.     Pt will continue to benefit from skilled outpatient physical therapy to address the deficits listed in the problem list box on initial evaluation, provide pt/family education and to maximize pt's level of independence in the home and community environment.     Pt's spiritual, cultural and educational needs considered and pt agreeable to plan of care and goals.     Anticipated barriers to physical therapy: chronic nature of symptoms    Goals:   Short Term Goals: 6 weeks   1.  Patient will demonstrate right knee ROM at least 5-0-120 degree. MET  2.  Patient will demonstrate right quad strength by performing 3x10 of active straight leg raise without lag.  MET  3.  Patient will report worst right knee pain < 4/10 to improve ADL performance. MET     Long Term Goals: 16 weeks   1.  Patient will demonstrate ability to perform > 12 sit <> stand transfers w/o UE use for improved ability to stand from low surfaces. PROGRESSING; NOT MET  2.  Patient will demonstrate right quad strength via MicroFET of > 27 kg to improve transfers, gait and ADL performance. PROGRESSING; NOT MET  3.  Patient will demonstrate to PT comprehensive understanding of each HEP component without verbal cueing. PROGRESSING; NOT MET  4.  Patient will improve FOTO to < 43% to improve ADL performance. PROGRESSING; NOT MET  5.  Patient will demonstrate ability to negotiate a flight of stairs with reciprocal pattern, no handrail and no compensation or symptoms. PROGRESSING; NOT MET    PLAN     Cont with POC per ACL quad tendon  protocol  Currently phase IV (squats/leg press 0-60 degree, single leg stance/stability exercises)  BFR PRN  dry needling PRN    Shan Dolan, PT

## 2023-05-22 ENCOUNTER — CLINICAL SUPPORT (OUTPATIENT)
Dept: REHABILITATION | Facility: HOSPITAL | Age: 38
End: 2023-05-22
Payer: MEDICAID

## 2023-05-22 DIAGNOSIS — Z74.09 DECREASED FUNCTIONAL MOBILITY AND ENDURANCE: Primary | ICD-10-CM

## 2023-05-22 DIAGNOSIS — M62.81 QUADRICEPS WEAKNESS: ICD-10-CM

## 2023-05-22 PROCEDURE — 97110 THERAPEUTIC EXERCISES: CPT | Mod: PN

## 2023-05-22 NOTE — PROGRESS NOTES
[facility]   Physical Therapy Treatment Note     Name: Terri Summers  Clinic Number: [id number]    Therapy Diagnosis:   Encounter Diagnoses   Name Primary?    Decreased functional mobility and endurance Yes    Quadriceps weakness      Physician: Shan Crystal MD    Visit Date: 5/22/2023  Physician Orders: PT Eval and Treat   Medical Diagnosis from Referral:   S83.004D (ICD-10-CM) - Traumatic dislocation of patella, right, subsequent encounter   S83.511A (ICD-10-CM) - Chronic rupture of ACL of right knee   Evaluation Date: 11/22/2022  Plan of Care Expiration: 6/9/23   Progress Note Due: 6/9/23  Visit # / Visits authorized: 21/20 (+16),   FOTO: 8/10  PTA Visit: 0/5    Precautions: standard, post-op ACL with quad tendon autograph protocol; NO LOADED FLEXION PAST 90 x 4 months; May unlock hinged knee brace and WBAT.    *PRECAUTION ? Graft at weakest point during this period.  No impact loading activities such as jumping, running, pivoting, or cutting    Time In: 8:05 AM  Time Out: 9:15 AM  Total Billable Time: 70 minutes (TEx5) - Medicaid    SUBJECTIVE     Pt reports: small little knee gemini only when she turns a corner or when she gets up from prolonged sitting. Pain is low and doing better.    She was compliant with her home exercise program.    Response to previous treatment: no problems.  Functional change: no longer using any AD or braces    Pain: 2/10  Location: right knee      OBJECTIVE     See re-assessment on 12/16/22    Date of surgery: 12/15/22 by Dr. Crystal  Procedure:  Right ACL reconstruction with quadriceps autograft   Right all-inside lateral meniscus repair  Right medial quadriceps tendon reconstruction with semitendinosis allograft     5/4/23  MicroFET:  Quad: right: 21 kg Left: 32.9 kg  Hamstring: right: 17.6 kg  Left: 19.8 kg    Treatment     Terri received the treatments listed below      Manual therapy for 15 minutes including:    Dry needling to right knee (3  "minutes) with e-stim to pain reduction (12 minutes)    therapeutic exercises to develop strength, endurance, ROM, flexibility, posture, and core stabilization for minutes 55 including testing:    Straight leg raise: 8x8, 4# right  C sweeps: 3x10 right stance leg  Right heel raise: 3x8  Deadlifts: 2x15 15# kettle bell  Single leg deadlift: 2x10 right  Lateral step downs: 4" step 3x8, right  Single leg stance figure 8: 10x    Leg press: 3x15, 8 plates, seat 5 (0-80 degrees) double leg   Right leg press: 3x8 6 plates (0-90 degree)  Shuttle sidelying hip abduction: 1 low cord, 1.5 upper cord 3x10    Not today:  Bridge walkouts: 12x   Pistol squats: 25" table 8x right; 24" table 8x; 23" table 5x  Split squats: 3x10 right forward  Single leg stance on BOSU ball: 5x10" holds (round side up)  Right eccentric hamstring curl machine (down 2, up 1): 3x10 4 plates  Lateral walks: yellow theraband in // bars 3 laps    Not today:  Blood flow resistance trainin% LOP (UOP: 220 mm Hg; 70% = 154mmHg) with autoregulate setting and large cuff with following exercises performing 30, 15, 15, 15 with 30 second rest breaks between sets   Straight leg raise: 1.5#  Long arc quad: 3#    Ice applied to right knee for 00 minutes in supine for pain relief and decrease in swelling.    Patient Education and Home Exercises     Home Exercises Provided and Patient Education Provided   Education provided:   - use of and fitting of hinge brace  - benefits of NMES and TENS - units to purchase  - blood flow restriction therapy  - sitting with brace unlocked for a little at home  - risks and benefits of dry needling - signed consent on 23    Written Home Exercises Provided: yes. Exercises were reviewed and Terri was able to demonstrate them prior to the end of the session.  Terri demonstrated good  understanding of the education provided. See EMR under Patient Instructions for exercises provided during therapy sessions    ASSESSMENT     Terri " arrives status post ACL repair with quad tendon autograph and MQTFL reconstruction with semitendinosis allograph. Right quad strength 64% of left. Improving control with single leg activities.   Advanced weight without blood flow resistance training; similar fatigue present though patient likely ready for full progression to standing activities full time. Pain remains low in session and during the day, but pain predominately at night which is why dry needling was performed. Pt progressing well overall and focusing of right quad loading progression and hamstring strenthening.    Pt prognosis is Good.     Pt will continue to benefit from skilled outpatient physical therapy to address the deficits listed in the problem list box on initial evaluation, provide pt/family education and to maximize pt's level of independence in the home and community environment.     Pt's spiritual, cultural and educational needs considered and pt agreeable to plan of care and goals.     Anticipated barriers to physical therapy: chronic nature of symptoms    Goals:   Short Term Goals: 6 weeks   1.  Patient will demonstrate right knee ROM at least 5-0-120 degree. MET  2.  Patient will demonstrate right quad strength by performing 3x10 of active straight leg raise without lag.  MET  3.  Patient will report worst right knee pain < 4/10 to improve ADL performance. MET     Long Term Goals: 16 weeks   1.  Patient will demonstrate ability to perform > 12 sit <> stand transfers w/o UE use for improved ability to stand from low surfaces. PROGRESSING; NOT MET  2.  Patient will demonstrate right quad strength via MicroFET of > 27 kg to improve transfers, gait and ADL performance. PROGRESSING; NOT MET  3.  Patient will demonstrate to PT comprehensive understanding of each HEP component without verbal cueing. PROGRESSING; NOT MET  4.  Patient will improve FOTO to < 43% to improve ADL performance. PROGRESSING; NOT MET  5.  Patient will demonstrate  ability to negotiate a flight of stairs with reciprocal pattern, no handrail and no compensation or symptoms. PROGRESSING; NOT MET    PLAN     Cont with POC per ACL quad tendon protocol  Currently phase IV (squats/leg press 0-60 degree, single leg stance/stability exercises)  BFR PRN  dry needling PRN    Darius Moore, PT

## 2023-05-29 ENCOUNTER — CLINICAL SUPPORT (OUTPATIENT)
Dept: REHABILITATION | Facility: HOSPITAL | Age: 38
End: 2023-05-29
Payer: MEDICAID

## 2023-05-29 DIAGNOSIS — Z74.09 DECREASED FUNCTIONAL MOBILITY AND ENDURANCE: Primary | ICD-10-CM

## 2023-05-29 DIAGNOSIS — M62.81 QUADRICEPS WEAKNESS: ICD-10-CM

## 2023-05-29 PROCEDURE — 97110 THERAPEUTIC EXERCISES: CPT | Mod: PN

## 2023-05-29 NOTE — PROGRESS NOTES
[facility]   Physical Therapy Treatment Note     Name: Terri Summers  Clinic Number: [id number]    Therapy Diagnosis:   Encounter Diagnoses   Name Primary?    Decreased functional mobility and endurance Yes    Quadriceps weakness      Physician: Shan Crystal MD    Visit Date: 5/29/2023  Physician Orders: PT Eval and Treat   Medical Diagnosis from Referral:   S83.004D (ICD-10-CM) - Traumatic dislocation of patella, right, subsequent encounter   S83.511A (ICD-10-CM) - Chronic rupture of ACL of right knee   Evaluation Date: 11/22/2022  Plan of Care Expiration: 6/9/23   Progress Note Due: 6/9/23  Visit # / Visits authorized: 22/20 (+16),   FOTO: 8/10  PTA Visit: 0/5    Precautions: standard, post-op ACL with quad tendon autograph protocol; NO LOADED FLEXION PAST 90 x 4 months; May unlock hinged knee brace and WBAT.    *PRECAUTION ? Graft at weakest point during this period.  No impact loading activities such as jumping, running, pivoting, or cutting    Time In: 8:05 AM  Time Out: 9:00 AM  Total Billable Time: 55 minutes (TEx4) - Medicaid    SUBJECTIVE     Pt reports: knee pain is better overall. Still waking at night and will have to walk around a bit. Knee pain at medial side of knee not as tender, and buckling in minimal now - no falls.     She was compliant with her home exercise program.    Response to previous treatment: no problems.  Functional change: no longer using any AD or braces    Pain: 2/10  Location: right knee      OBJECTIVE     See re-assessment on 12/16/22    Date of surgery: 12/15/22 by Dr. Crystal  Procedure:  Right ACL reconstruction with quadriceps autograft   Right all-inside lateral meniscus repair  Right medial quadriceps tendon reconstruction with semitendinosis allograft     5/4/23  MicroFET:  Quad: right: 21 kg Left: 32.9 kg  Hamstring: right: 17.6 kg  Left: 19.8 kg    Treatment     Terri received the treatments listed below      Manual therapy for 00 minutes  "including:    Dry needling to right knee (3 minutes) with e-stim to pain reduction (12 minutes)    therapeutic exercises to develop strength, endurance, ROM, flexibility, posture, and core stabilization for minutes 30 including testing:    Straight leg raise: 2x20, 5# right  C sweeps: 3x10 right stance leg on airex  Right heel raise: 3x15  Lateral step downs: 4" step 2x10, right    Leg press: 2x20, 8 plates, seat 5 (0-100 degrees) double leg, sled=5  Right leg press: 2x10, 5 plates (0-100 degree); sled=5  Shuttle sidelying hip abduction: 1 low cord, 1.5 upper cord 3x10    Not today:  Pistol squats: 25" table 8x right; 24" table 8x; 23" table 5x  Split squats: 3x10 right forward    Therapeutic activities for 25 minutes including:  Single leg deadlift: 2x15 right, 20# kettle bell  Lateral boundinx Bilateral, cues for increased right knee flexion  Lateral crab walks: red theraband, 2 laps of 80'  Rebounder single leg stance: 2x30'' on RLE     Ice applied to right knee for 00 minutes in supine for pain relief and decrease in swelling.    Patient Education and Home Exercises     Home Exercises Provided and Patient Education Provided   Education provided:   - use of and fitting of hinge brace  - benefits of NMES and TENS - units to purchase  - blood flow restriction therapy  - sitting with brace unlocked for a little at home  - risks and benefits of dry needling - signed consent on 23    Written Home Exercises Provided: yes. Exercises were reviewed and Terri was able to demonstrate them prior to the end of the session.  Terri demonstrated good  understanding of the education provided. See EMR under Patient Instructions for exercises provided during therapy sessions    ASSESSMENT     Terri arrives status post ACL repair with quad tendon autograph and MQTFL reconstruction with semitendinosis allograph.initiated beginner plyometrics in lateral plane today with no increase in pain or instability seen. Moderate " cueing to avoid stiff legged posture for better knee absorption and working on increasing proprioception overall. No pain with lateral bounding and prolonged single leg postures for exercises.     Pt prognosis is Good.     Pt will continue to benefit from skilled outpatient physical therapy to address the deficits listed in the problem list box on initial evaluation, provide pt/family education and to maximize pt's level of independence in the home and community environment.     Pt's spiritual, cultural and educational needs considered and pt agreeable to plan of care and goals.     Anticipated barriers to physical therapy: chronic nature of symptoms    Goals:   Short Term Goals: 6 weeks   1.  Patient will demonstrate right knee ROM at least 5-0-120 degree. MET  2.  Patient will demonstrate right quad strength by performing 3x10 of active straight leg raise without lag.  MET  3.  Patient will report worst right knee pain < 4/10 to improve ADL performance. MET     Long Term Goals: 16 weeks   1.  Patient will demonstrate ability to perform > 12 sit <> stand transfers w/o UE use for improved ability to stand from low surfaces. PROGRESSING; NOT MET  2.  Patient will demonstrate right quad strength via MicroFET of > 27 kg to improve transfers, gait and ADL performance. PROGRESSING; NOT MET  3.  Patient will demonstrate to PT comprehensive understanding of each HEP component without verbal cueing. PROGRESSING; NOT MET  4.  Patient will improve FOTO to < 43% to improve ADL performance. PROGRESSING; NOT MET  5.  Patient will demonstrate ability to negotiate a flight of stairs with reciprocal pattern, no handrail and no compensation or symptoms. PROGRESSING; NOT MET    PLAN     Cont with POC per ACL quad tendon protocol  Currently phase IV (squats/leg press 0-60 degree, single leg stance/stability exercises)  BFR PRN  dry needling PRN    Darius Moore, PT

## 2023-05-30 ENCOUNTER — OFFICE VISIT (OUTPATIENT)
Dept: ORTHOPEDICS | Facility: CLINIC | Age: 38
End: 2023-05-30
Payer: MEDICAID

## 2023-05-30 VITALS
BODY MASS INDEX: 29.23 KG/M2 | WEIGHT: 181.88 LBS | SYSTOLIC BLOOD PRESSURE: 143 MMHG | HEIGHT: 66 IN | HEART RATE: 117 BPM | DIASTOLIC BLOOD PRESSURE: 98 MMHG

## 2023-05-30 DIAGNOSIS — Z98.890 S/P LATERAL MENISCUS REPAIR OF RIGHT KNEE: ICD-10-CM

## 2023-05-30 DIAGNOSIS — Z98.890 S/P ACL RECONSTRUCTION: Primary | ICD-10-CM

## 2023-05-30 PROCEDURE — 1159F PR MEDICATION LIST DOCUMENTED IN MEDICAL RECORD: ICD-10-PCS | Mod: CPTII,,, | Performed by: ORTHOPAEDIC SURGERY

## 2023-05-30 PROCEDURE — 1160F RVW MEDS BY RX/DR IN RCRD: CPT | Mod: CPTII,,, | Performed by: ORTHOPAEDIC SURGERY

## 2023-05-30 PROCEDURE — 3077F PR MOST RECENT SYSTOLIC BLOOD PRESSURE >= 140 MM HG: ICD-10-PCS | Mod: CPTII,,, | Performed by: ORTHOPAEDIC SURGERY

## 2023-05-30 PROCEDURE — 99213 OFFICE O/P EST LOW 20 MIN: CPT | Mod: S$PBB,,, | Performed by: ORTHOPAEDIC SURGERY

## 2023-05-30 PROCEDURE — 99999 PR PBB SHADOW E&M-EST. PATIENT-LVL III: CPT | Mod: PBBFAC,,, | Performed by: ORTHOPAEDIC SURGERY

## 2023-05-30 PROCEDURE — 3080F PR MOST RECENT DIASTOLIC BLOOD PRESSURE >= 90 MM HG: ICD-10-PCS | Mod: CPTII,,, | Performed by: ORTHOPAEDIC SURGERY

## 2023-05-30 PROCEDURE — 3008F BODY MASS INDEX DOCD: CPT | Mod: CPTII,,, | Performed by: ORTHOPAEDIC SURGERY

## 2023-05-30 PROCEDURE — 99213 OFFICE O/P EST LOW 20 MIN: CPT | Mod: PBBFAC,PN | Performed by: ORTHOPAEDIC SURGERY

## 2023-05-30 PROCEDURE — 99999 PR PBB SHADOW E&M-EST. PATIENT-LVL III: ICD-10-PCS | Mod: PBBFAC,,, | Performed by: ORTHOPAEDIC SURGERY

## 2023-05-30 PROCEDURE — 3077F SYST BP >= 140 MM HG: CPT | Mod: CPTII,,, | Performed by: ORTHOPAEDIC SURGERY

## 2023-05-30 PROCEDURE — 3008F PR BODY MASS INDEX (BMI) DOCUMENTED: ICD-10-PCS | Mod: CPTII,,, | Performed by: ORTHOPAEDIC SURGERY

## 2023-05-30 PROCEDURE — 1160F PR REVIEW ALL MEDS BY PRESCRIBER/CLIN PHARMACIST DOCUMENTED: ICD-10-PCS | Mod: CPTII,,, | Performed by: ORTHOPAEDIC SURGERY

## 2023-05-30 PROCEDURE — 99213 PR OFFICE/OUTPT VISIT, EST, LEVL III, 20-29 MIN: ICD-10-PCS | Mod: S$PBB,,, | Performed by: ORTHOPAEDIC SURGERY

## 2023-05-30 PROCEDURE — 3080F DIAST BP >= 90 MM HG: CPT | Mod: CPTII,,, | Performed by: ORTHOPAEDIC SURGERY

## 2023-05-30 PROCEDURE — 1159F MED LIST DOCD IN RCRD: CPT | Mod: CPTII,,, | Performed by: ORTHOPAEDIC SURGERY

## 2023-05-30 NOTE — PROGRESS NOTES
"Patient ID:   Terri Summers is a 37 y.o. female.    Chief Complaint:   5m 15d s/p R ACLR with quad autograft, MQTFL reconstruction with hamstring allograft, LM repair    HPI:   The patient is returning for evaluation of the right knee. She is doing much better. Pain level down to 2/10. No recurrent instability events.     Medications:    Current Outpatient Medications:     acetaminophen (TYLENOL) 325 MG tablet, Take 325 mg by mouth every 6 (six) hours as needed for Pain., Disp: , Rfl:     ALPRAZolam (XANAX) 0.5 MG tablet, Take 1 tablet (0.5 mg total) by mouth daily as needed for Anxiety., Disp: 30 tablet, Rfl: 5    [START ON 6/20/2023] dextroamphetamine-amphetamine (ADDERALL) 30 mg Tab, Take 1 tablet (30 mg total) by mouth 2 (two) times daily., Disp: 60 tablet, Rfl: 0    dextroamphetamine-amphetamine 30 mg Tab, Take 1 tablet (30 mg total) by mouth 2 (two) times daily., Disp: 60 tablet, Rfl: 0    [START ON 7/20/2023] dextroamphetamine-amphetamine 30 mg Tab, Take 1 tablet (30 mg total) by mouth 2 (two) times daily., Disp: 60 tablet, Rfl: 0    ergocalciferol (ERGOCALCIFEROL) 50,000 unit Cap, Take 50,000 Units by mouth every 7 days., Disp: , Rfl:     meloxicam (MOBIC) 7.5 MG tablet, Take 1 tablet (7.5 mg total) by mouth once daily., Disp: 28 tablet, Rfl: 0    traMADoL (ULTRAM) 50 mg tablet, Take 1 tablet (50 mg total) by mouth every 6 (six) hours as needed for Pain., Disp: 20 tablet, Rfl: 0    Allergies:  Review of patient's allergies indicates:  No Known Allergies    Vitals:  BP (!) 143/98   Pulse (!) 117   Ht 5' 6" (1.676 m)   Wt 82.5 kg (181 lb 14.1 oz)   BMI 29.36 kg/m²     Physical Examination:  Ortho Exam   Right knee exam:  1+ effusion.  No lateral joint line tenderness  ROM 0-120  Lachmans 1A, negative pivot    Assessment:  1. S/P ACL reconstruction    2. S/P lateral meniscus repair of right knee      Plan:  Doing better. Continue PT. RTC in three months.       No follow-ups on file.          "

## 2023-06-02 ENCOUNTER — TELEPHONE (OUTPATIENT)
Dept: REHABILITATION | Facility: HOSPITAL | Age: 38
End: 2023-06-02
Payer: MEDICAID

## 2023-06-02 NOTE — TELEPHONE ENCOUNTER
Attempted to contact patient due to NS for today's appt.  LM to check status, review attendance policy and request contact to schedule a progress note to continue due to no further visits being scheduled at this time.    Shan Dolan, PT

## 2023-06-03 ENCOUNTER — PATIENT MESSAGE (OUTPATIENT)
Dept: REHABILITATION | Facility: HOSPITAL | Age: 38
End: 2023-06-03
Payer: MEDICAID

## 2023-06-05 ENCOUNTER — PATIENT MESSAGE (OUTPATIENT)
Dept: REHABILITATION | Facility: HOSPITAL | Age: 38
End: 2023-06-05
Payer: MEDICAID

## 2023-06-06 ENCOUNTER — CLINICAL SUPPORT (OUTPATIENT)
Dept: REHABILITATION | Facility: HOSPITAL | Age: 38
End: 2023-06-06
Payer: MEDICAID

## 2023-06-06 DIAGNOSIS — Z74.09 DECREASED FUNCTIONAL MOBILITY AND ENDURANCE: Primary | ICD-10-CM

## 2023-06-06 DIAGNOSIS — M62.81 QUADRICEPS WEAKNESS: ICD-10-CM

## 2023-06-06 PROCEDURE — 97110 THERAPEUTIC EXERCISES: CPT | Mod: PN | Performed by: PHYSICAL THERAPIST

## 2023-06-06 RX ORDER — MELOXICAM 7.5 MG/1
7.5 TABLET ORAL DAILY
Qty: 28 TABLET | Refills: 0 | Status: SHIPPED | OUTPATIENT
Start: 2023-06-06

## 2023-06-06 NOTE — PROGRESS NOTES
[facility]   Physical Therapy Treatment Note     Name: Terri Summers  Clinic Number: [id number]    Therapy Diagnosis:   Encounter Diagnoses   Name Primary?    Decreased functional mobility and endurance Yes    Quadriceps weakness        Physician: Shan Crystal MD    Visit Date: 6/6/2023  Physician Orders: PT Eval and Treat   Medical Diagnosis from Referral:   S83.004D (ICD-10-CM) - Traumatic dislocation of patella, right, subsequent encounter   S83.511A (ICD-10-CM) - Chronic rupture of ACL of right knee   Evaluation Date: 11/22/2022  Plan of Care Expiration: 6/9/23   Progress Note Due: 6/9/23  Visit # / Visits authorized: 23/40 (+16),   FOTO: 9/10  PTA Visit: 0/5    Precautions: standard, post-op ACL with quad tendon autograph protocol; NO LOADED FLEXION PAST 90 x 4 months; May unlock hinged knee brace and WBAT.    *PRECAUTION ? Graft at weakest point during this period.  No impact loading activities such as jumping, running, pivoting, or cutting    Time In: 8:06 AM  Time Out: 9:02 AM  Total Billable Time: 55 minutes (TEx4) - Medicaid    SUBJECTIVE     Pt reports: she saw Dr. Crystal since last visit and was told to continue with PT. Her biggest issue is sleeping. Stairs are easier, but still step to.    She was compliant with her home exercise program.    Response to previous treatment: no problems.  Functional change: no longer using any AD or braces    Pain: 2/10  Location: right knee      OBJECTIVE     See re-assessment on 12/16/22    Date of surgery: 12/15/22 by Dr. Crystal  Procedure:  Right ACL reconstruction with quadriceps autograft   Right all-inside lateral meniscus repair  Right medial quadriceps tendon reconstruction with semitendinosis allograft     5/4/23  MicroFET:  Quad: right: 21 kg Left: 32.9 kg  Hamstring: right: 17.6 kg  Left: 19.8 kg    Treatment     Terri received the treatments listed below      **ALL BILLING AS TE PER MEDICAID GUIDELINES**     therapeutic  "exercises to develop strength, endurance, ROM, flexibility, posture, and core stabilization for minutes 30 including testing:    Straight leg raise: 2x20, 5# right  Bridge walkouts: 15x    Eccentric Leg press: 30x, 7 plates, seat 5 (0-100 degrees) double leg, sled=5  Right leg press (power): 2x5, 8 plates (0-100 degree); sled=5  Shuttle sidelying hip abduction: 1 low cord, 1.5 upper cord 3x10  Hip hinge with vertical pole: 15x  Good mornings: 10 pounds 2x10  Split squats: 3x10 each  Single leg deadlift: 2x10 each, 20# kettle bell  Rebounder single leg stance: 2x30'' on RLE   Lateral boundinx Bilateral, cues for increased right knee flexion  Double leg hops: 2x30"     2 exercise circuit :  Right heel raise: 3x15  Lateral step downs: 4" step 3x10, right      Not today:  Pistol squats: 25" table 8x right; 24" table 8x; 23" table 5x    C sweeps: 3x10 right stance leg on airex  Lateral crab walks: red theraband, 2 laps of 80'      Ice applied to right knee for 00 minutes in supine for pain relief and decrease in swelling.    Patient Education and Home Exercises     Home Exercises Provided and Patient Education Provided   Education provided:   - use of and fitting of hinge brace  - benefits of NMES and TENS - units to purchase  - blood flow restriction therapy  - sitting with brace unlocked for a little at home  - risks and benefits of dry needling - signed consent on 23    Written Home Exercises Provided: yes. Exercises were reviewed and Terri was able to demonstrate them prior to the end of the session.  Terri demonstrated good  understanding of the education provided. See EMR under Patient Instructions for exercises provided during therapy sessions    ASSESSMENT     Terri arrives status post ACL repair with quad tendon autograph and MQTFL reconstruction with semitendinosis allograph. Improved quad, hamstring and lateral hip strength as seen by progressions in bold and ready for continued progress with " beginner plyometrics. Cues for hip hinging needed to avoid lumbar range of motion with good mornings, but then able to perform. Plan UPOC next visit.    Pt prognosis is Good.     Pt will continue to benefit from skilled outpatient physical therapy to address the deficits listed in the problem list box on initial evaluation, provide pt/family education and to maximize pt's level of independence in the home and community environment.     Pt's spiritual, cultural and educational needs considered and pt agreeable to plan of care and goals.     Anticipated barriers to physical therapy: chronic nature of symptoms    Goals:   Short Term Goals: 6 weeks   1.  Patient will demonstrate right knee ROM at least 5-0-120 degree. MET  2.  Patient will demonstrate right quad strength by performing 3x10 of active straight leg raise without lag.  MET  3.  Patient will report worst right knee pain < 4/10 to improve ADL performance. MET     Long Term Goals: 16 weeks   1.  Patient will demonstrate ability to perform > 12 sit <> stand transfers w/o UE use for improved ability to stand from low surfaces. PROGRESSING; NOT MET  2.  Patient will demonstrate right quad strength via MicroFET of > 27 kg to improve transfers, gait and ADL performance. PROGRESSING; NOT MET  3.  Patient will demonstrate to PT comprehensive understanding of each HEP component without verbal cueing. PROGRESSING; NOT MET  4.  Patient will improve FOTO to < 43% to improve ADL performance. PROGRESSING; NOT MET  5.  Patient will demonstrate ability to negotiate a flight of stairs with reciprocal pattern, no handrail and no compensation or symptoms. PROGRESSING; NOT MET    PLAN     Cont with POC per ACL quad tendon protocol  Currently phase IV (squats/leg press 0-60 degree, single leg stance/stability exercises)  BFR PRN  dry needling PRN    Shan Dolan, PT

## 2023-06-07 ENCOUNTER — CLINICAL SUPPORT (OUTPATIENT)
Dept: REHABILITATION | Facility: HOSPITAL | Age: 38
End: 2023-06-07
Payer: MEDICAID

## 2023-06-07 DIAGNOSIS — Z74.09 DECREASED FUNCTIONAL MOBILITY AND ENDURANCE: Primary | ICD-10-CM

## 2023-06-07 DIAGNOSIS — M62.81 QUADRICEPS WEAKNESS: ICD-10-CM

## 2023-06-07 PROCEDURE — 97110 THERAPEUTIC EXERCISES: CPT | Mod: PN | Performed by: PHYSICAL THERAPIST

## 2023-06-07 NOTE — PROGRESS NOTES
[facility]   Physical Therapy Treatment Note     Name: Terri Summers  Clinic Number: [id number]    Therapy Diagnosis:   Encounter Diagnoses   Name Primary?    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    *PRECAUTION ? Graft at weakest point during this period.  No impact loading activities such as jumping, running, pivoting, or cutting    Time In: 8:04 AM  Time Out: 9:00 AM  Total Billable Time: 54 minutes (TEx4) - Medicaid    SUBJECTIVE     Pt reports: See UPOC    She was compliant with her home exercise program.    Response to previous treatment: no problems.  Functional change: no longer using any AD or braces    Pain: 2/10  Location: right knee      OBJECTIVE     See re-assessment on 12/16/22    Date of surgery: 12/15/22 by Dr. Crystal  Procedure:  Right ACL reconstruction with quadriceps autograft   Right all-inside lateral meniscus repair  Right medial quadriceps tendon reconstruction with semitendinosis allograft     See UPOC on 6/7/23    Treatment     Terri received the treatments listed below      **ALL BILLING AS TE PER MEDICAID GUIDELINES**     therapeutic exercises to develop strength, endurance, ROM, flexibility, posture, and core stabilization for minutes 54 including testing:    Objective measures    Forward step downs: 2x10 right  "    Plyometrics:   Lateral bounding: 2x30" (fast)  Lateral bounding for distance: 10x each  Double leg hops: 2x30"  Double leg square hops: 2x30" (1x each direction)  Ladder drills: (2 laps each)   - high knees 1 foot in each   - high knees 2 foot in each   - icky shuffle   - lateral in/in out/out  T drill on command: 1 minutes       Not today:  Eccentric Leg press: 30x, 7 plates, seat 5 (0-100 degrees) double leg, sled=5  Right leg press (power): 2x5, 8 plates (0-100 degree); sled=5  Shuttle sidelying hip abduction: 1 low cord, 1.5 upper cord 3x10  Hip hinge with vertical pole: 15x  Good mornings: 10 pounds 2x10  Split squats: 3x10 each  Single leg deadlift: 2x10 each, 20# kettle bell  Rebounder single leg stance: 2x30'' on RLE        2 exercise circuit :  Right heel raise: 3x15  Lateral step downs: 4" step 3x10, right      Pistol squats: 25" table 8x right; 24" table 8x; 23" table 5x        Patient Education and Home Exercises     Home Exercises Provided and Patient Education Provided   Education provided:   - use of and fitting of hinge brace  - benefits of NMES and TENS - units to purchase  - blood flow restriction therapy  - sitting with brace unlocked for a little at home  - risks and benefits of dry needling - signed consent on 4/17/23  - starting biking on stationary bike at gym, starting stationary flutter kicks in pool at 6 months    Written Home Exercises Provided: yes. Exercises were reviewed and Terri was able to demonstrate them prior to the end of the session.  Terri demonstrated good  understanding of the education provided. See EMR under Patient Instructions for exercises provided during therapy sessions    ASSESSMENT     Terri arrives status post ACL repair with quad tendon autograph and MQTFL reconstruction with semitendinosis allograph. See UPOC    Pt prognosis is Good.     Pt will continue to benefit from skilled outpatient physical therapy to address the deficits listed in the problem list box " on initial evaluation, provide pt/family education and to maximize pt's level of independence in the home and community environment.     Pt's spiritual, cultural and educational needs considered and pt agreeable to plan of care and goals.     Anticipated barriers to physical therapy: chronic nature of symptoms    Goals:   Short Term Goals: 6 weeks   1.  Patient will demonstrate right knee ROM at least 5-0-120 degree. MET  2.  Patient will demonstrate right quad strength by performing 3x10 of active straight leg raise without lag.  MET  3.  Patient will report worst right knee pain < 4/10 to improve ADL performance. MET     Long Term Goals: 16 weeks   1.  Patient will demonstrate ability to perform > 12 sit <> stand transfers w/o UE use for improved ability to stand from low surfaces. MET  2.  Patient will demonstrate right quad strength via MicroFET of > 27 kg to improve transfers, gait and ADL performance. MET  3.  Patient will demonstrate to PT comprehensive understanding of each HEP component without verbal cueing. MET  4.  Patient will improve FOTO to < 43% to improve ADL performance. PROGRESSING; NOT MET  5.  Patient will demonstrate ability to negotiate a flight of stairs with reciprocal pattern, no handrail and no compensation or symptoms. PROGRESSING; NOT MET    PLAN     Cont with POC per ACL quad tendon protocol  Currently return to sport phase; plyometrics  BFR PRN  dry needling PRN    Shan Dolan, PT

## 2023-06-07 NOTE — PLAN OF CARE
"  Outpatient Therapy Updated Plan of Care     Visit Date: 6/7/2023  Name: Terri Summers  Clinic Number: [id number]    Therapy Diagnosis:   Encounter Diagnoses   Name Primary?    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 She golfed this weekend and did well. She wore her knee brace for first 9, but took it off for final 6 (didn't play all 18 due to heat).     Objective     Update:     All below testing performed in seated position. Gait belt used for quadriceps testing.    Lower extremity strength with [employer] handheld dynamometer Right 6/7/23 Left Pain/dysfunction with movement   (approx 4 sec hold w/ max contraction)   Hip flexion 17.9 kg  22.1 kg 19.3 kg     Hip abduction  4+/5  5/5    Quadriceps 18.4 kg  30.1 30.1 kg (34.9)    Hamstrings 12.9 kg  22.4 kg 19 kg        Right knee flexion AROM: 5-0-135 degree     30" sit <> stand: 17x   Squat: even weight shift, 75% depth  Single leg stance: 1 minutes without upper extremity   Stairs: reciprocal, no handrails  Gait: mild decreased stance time on right   Lateral step down test: negative (10 reps)    Assessment     Update: Terri arrives status post ACL repair with quad tendon autograph and MQTFL reconstruction with semitendinosis allograph. She has progressed very well since dry needling after " her fall. Strength is now within 90% of contralateral and stability and improved. She still ambulates with mild decrease in stance time on right lower extremity. Mild hesitation with descending stairs as well. Began plyometrics today with no increase in symptoms, but increased fatigue and difficulty. Plan to continue PT for one more month with progression to full home exercise program.     Previous Short Term Goals Status:   MET 2/3  1.  Patient will demonstrate right knee ROM at least 5-0-120 degree. MET  2.  Patient will demonstrate right quad strength by performing 3x10 of active straight leg raise without lag.  MET  3.  Patient will report worst right knee pain < 4/10 to improve ADL performance. PROGRESSING; NOT MET  New Short Term Goals Status:   none  Long Term Goal Status:   continue per initial plan of care.  1.  Patient will demonstrate ability to perform > 12 sit <> stand transfers w/o UE use for improved ability to stand from low surfaces. MET  2.  Patient will demonstrate right quad strength via MicroFET of > 27 kg to improve transfers, gait and ADL performance. MET  3.  Patient will demonstrate to PT comprehensive understanding of each HEP component without verbal cueing. MET  4.  Patient will improve FOTO to < 43% to improve ADL performance. MET  5.  Patient will demonstrate ability to negotiate a flight of stairs with reciprocal pattern, no handrail and no compensation or symptoms. PROGRESSING; NOT MET  Reasons for Recertification of Therapy:   UPOC    Plan     Updated Certification Period: 6/9/23 to 7/7/23  Recommended Treatment Plan: 2 times per week for 4 weeks: Electrical Stimulation TENS, IFC, NMES, Gait Training, Manual Therapy, Moist Heat/ Ice, Neuromuscular Re-ed, Patient Education, Self Care, Therapeutic Activities, Therapeutic Exercise, and dry needling and blood flow restriction therapy  Other Recommendations: BFR; progress per typical quad tendon repair protocol    Shan Dolan,  PT  6/7/2023      I CERTIFY THE NEED FOR THESE SERVICES FURNISHED UNDER THIS PLAN OF TREATMENT AND WHILE UNDER MY CARE    Physician's comments:        Physician's Signature: ___________________________________________________     I certify the need for these services furnished under this plan of treatment and while under my care.    I certify the need for these services furnished under this plan of treatment and while under my care.        Shan Crystal MD, FAAOS  , Orthopaedic Sports Medicine  Residency   [facility] Department of Orthopaedic Surgery  Assistant Orthopaedic Surgeon, [place] [facility]  Head Team Physician, [place] [facility]

## 2023-06-12 ENCOUNTER — CLINICAL SUPPORT (OUTPATIENT)
Dept: REHABILITATION | Facility: HOSPITAL | Age: 38
End: 2023-06-12
Payer: MEDICAID

## 2023-06-12 DIAGNOSIS — M62.81 QUADRICEPS WEAKNESS: ICD-10-CM

## 2023-06-12 DIAGNOSIS — Z74.09 DECREASED FUNCTIONAL MOBILITY AND ENDURANCE: Primary | ICD-10-CM

## 2023-06-12 PROCEDURE — 97110 THERAPEUTIC EXERCISES: CPT | Mod: PN | Performed by: PHYSICAL THERAPIST

## 2023-06-12 NOTE — PROGRESS NOTES
[facility]   Physical Therapy Treatment Note     Name: Terri Summers  Clinic Number: [id number]    Therapy Diagnosis:   Encounter Diagnoses   Name Primary?    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    *PRECAUTION ? Graft at weakest point during this period.  No impact loading activities such as jumping, running, pivoting, or cutting    Time In: 8:04 AM  Time Out: 8:52 AM  Total Billable Time: 48 minutes (TEx3) - Medicaid    SUBJECTIVE     Pt reports: knee continues to feel good. No issues after initiation of plyometrics last visit.    She was compliant with her home exercise program.    Response to previous treatment: no problems.  Functional change: no longer using any AD or braces    Pain: 0/10  Location: right knee      OBJECTIVE     See re-assessment on 12/16/22    Date of surgery: 12/15/22 by Dr. Crystal  Procedure:  Right ACL reconstruction with quadriceps autograft   Right all-inside lateral meniscus repair  Right medial quadriceps tendon reconstruction with semitendinosis allograft     See UPOC on 6/7/23    Treatment     Terri received the treatments listed below      **ALL BILLING AS TE PER MEDICAID GUIDELINES**     therapeutic exercises to develop strength, endurance, ROM, flexibility, posture, and core stabilization for minutes 48  "including testing:    Active warmup: 50 ft x 2   Ankling, frankensteins, hamstring butt kickers      Plyometrics:   Lateral bounding: 2x30" (fast)  Lateral bounding for distance: 10x each  Double leg hops: 2x30"  Double leg square hops: 2x30" (1x each direction)  Ladder drills: (2 laps each)   - high knees 1 foot in each   - high knees 2 foot in each   - icky shuffle   - lateral in/in out/out  T drill on command: 45" 1x (stopped due to right knee buckling)  2 legged triple hop for distance: 2 laps  1 legged hops: 2x15"    Strengthening:  Forward step downs: 2x10 right (6" step)  Hip hinge with vertical pole: 10x  Good mornings: 10 pounds 2x10  Eccentric Leg press: 30x, 7 plates, seat 5 (0-100 degrees) double leg, sled=5  Right leg press (power): 3x5, 9 plates (0-100 degree); sled=5    2 exercise circuit :  Right heel raise: 3x15  Lateral step downs: 6" step 3x8, right    Not today:  Shuttle sidelying hip abduction: 1 low cord, 1.5 upper cord 3x10  Split squats: 3x10 each  Single leg deadlift: 2x10 each, 20# kettle bell  Rebounder single leg stance: 2x30'' on RLE   Pistol squats: 25" table 8x right; 24" table 8x; 23" table 5x        Patient Education and Home Exercises     Home Exercises Provided and Patient Education Provided   Education provided:   - use of and fitting of hinge brace  - benefits of NMES and TENS - units to purchase  - blood flow restriction therapy  - sitting with brace unlocked for a little at home  - risks and benefits of dry needling - signed consent on 4/17/23  - starting biking on stationary bike at gym, starting stationary flutter kicks in pool at 6 months    Written Home Exercises Provided: yes. Exercises were reviewed and Terri was able to demonstrate them prior to the end of the session.  Terri demonstrated good  understanding of the education provided. See EMR under Patient Instructions for exercises provided during therapy sessions    ASSESSMENT     Terri arrives status post ACL repair " with quad tendon autograph and MQTFL reconstruction with semitendinosis allograph. Continued plyometrics today with improved smoothness and ability to stay on the balls of her feet with more rhythmic exercises and better explosion with distance exercises. Mild buckling with T drill at 45 seconds, which was final plyometric exercise. Strength continues to improve as seen by change in right single leg press.     Pt prognosis is Good.     Pt will continue to benefit from skilled outpatient physical therapy to address the deficits listed in the problem list box on initial evaluation, provide pt/family education and to maximize pt's level of independence in the home and community environment.     Pt's spiritual, cultural and educational needs considered and pt agreeable to plan of care and goals.     Anticipated barriers to physical therapy: chronic nature of symptoms    Goals:   Short Term Goals: 6 weeks   1.  Patient will demonstrate right knee ROM at least 5-0-120 degree. MET  2.  Patient will demonstrate right quad strength by performing 3x10 of active straight leg raise without lag.  MET  3.  Patient will report worst right knee pain < 4/10 to improve ADL performance. MET     Long Term Goals: 16 weeks   1.  Patient will demonstrate ability to perform > 12 sit <> stand transfers w/o UE use for improved ability to stand from low surfaces. MET  2.  Patient will demonstrate right quad strength via MicroFET of > 27 kg to improve transfers, gait and ADL performance. MET  3.  Patient will demonstrate to PT comprehensive understanding of each HEP component without verbal cueing. MET  4.  Patient will improve FOTO to < 43% to improve ADL performance. PROGRESSING; NOT MET  5.  Patient will demonstrate ability to negotiate a flight of stairs with reciprocal pattern, no handrail and no compensation or symptoms. PROGRESSING; NOT MET    PLAN     Cont with POC per ACL quad tendon protocol  Currently return to sport phase;  plyometrics  BFR PRN  dry needling PRN    Shan Dolan, PT

## 2023-06-20 ENCOUNTER — CLINICAL SUPPORT (OUTPATIENT)
Dept: REHABILITATION | Facility: HOSPITAL | Age: 38
End: 2023-06-20
Payer: MEDICAID

## 2023-06-20 DIAGNOSIS — Z74.09 DECREASED FUNCTIONAL MOBILITY AND ENDURANCE: Primary | ICD-10-CM

## 2023-06-20 DIAGNOSIS — M62.81 QUADRICEPS WEAKNESS: ICD-10-CM

## 2023-06-20 PROCEDURE — 97110 THERAPEUTIC EXERCISES: CPT | Mod: PN | Performed by: PHYSICAL THERAPIST

## 2023-06-20 NOTE — PROGRESS NOTES
[facility]   Physical Therapy Treatment Note     Name: Terri Summers  Clinic Number: [id number]    Therapy Diagnosis:   Encounter Diagnoses   Name Primary?    Decreased functional mobility and endurance Yes    Quadriceps weakness        Physician: Shan Crystal MD    Visit Date: 6/20/2023  Physician Orders: PT Eval and Treat   Medical Diagnosis from Referral:   S83.004D (ICD-10-CM) - Traumatic dislocation of patella, right, subsequent encounter   S83.511A (ICD-10-CM) - Chronic rupture of ACL of right knee   Evaluation Date: 11/22/2022  Plan of Care Expiration: 7/7/23   Progress Note Due: 7/7/23  Visit # / Visits authorized: 26/40 (+16),   FOTO: performed 6/7/23 - goal met  PTA Visit: 0/5    Precautions: standard, post-op ACL with quad tendon autograph protocol; NO LOADED FLEXION PAST 90 x 4 months; May unlock hinged knee brace and WBAT.    *PRECAUTION ? Graft at weakest point during this period.  No impact loading activities such as jumping, running, pivoting, or cutting    Time In: 8:00 AM  Time Out: 8:56 AM  Total Billable Time: 56 minutes (TEx4) - Medicaid    SUBJECTIVE     Pt reports: knee continues to feel good. No issues after initiation of plyometrics last visit.    She was compliant with her home exercise program.    Response to previous treatment: no problems.  Functional change: no longer using any AD or braces    Pain: 0/10  Location: right knee      OBJECTIVE     See re-assessment on 12/16/22    Date of surgery: 12/15/22 by Dr. Crystal  Procedure:  Right ACL reconstruction with quadriceps autograft   Right all-inside lateral meniscus repair  Right medial quadriceps tendon reconstruction with semitendinosis allograft     See UPOC on 6/7/23    Treatment     Terri received the treatments listed below      **ALL BILLING AS TE PER MEDICAID GUIDELINES**     therapeutic exercises to develop strength, endurance, ROM, flexibility, posture, and core stabilization for minutes 56  "including testing:    Active warmup: 50 ft x 2   Ankling, frankensteins, hamstring butt kickers      Plyometrics:   Lateral bounding: 2x30" (fast)  Lateral bounding for distance: 10x each  Double leg hops: 2x30"  Double leg square hops: 2x30" (1x each direction)  Ladder drills: (2 laps each)   - high knees 1 foot in each   - high knees 2 foot in each   - icky shuffle   - lateral in/in out/out  T drill on command: 45" 1x   2 legged triple hop for distance: 2 laps  1 legged hops: 2x15"    Strengthening:  Good mornings: 10 pounds 2x10  Squat with 10 lb bar: 2x10  Eccentric Leg press: 30x, 7 plates, seat 5 (0-100 degrees) double leg, sled=5  Right leg press (power): 3x5, 9 plates (0-100 degree); sled=5  Split squats: 3x10 each    3 exercise circuit :  Right heel raise: 3x15  Lateral step downs: 6" step 3x8, right  Forward step downs: 3x8 right (6" step)    Not today:  Shuttle sidelying hip abduction: 1 low cord, 1.5 upper cord 3x10  Single leg deadlift: 2x10 each, 20# kettle bell  Rebounder single leg stance: 2x30'' on RLE   Pistol squats: 25" table 8x right; 24" table 8x; 23" table 5x        Patient Education and Home Exercises     Home Exercises Provided and Patient Education Provided   Education provided:   - use of and fitting of hinge brace  - benefits of NMES and TENS - units to purchase  - blood flow restriction therapy  - sitting with brace unlocked for a little at home  - risks and benefits of dry needling - signed consent on 4/17/23  - starting biking on stationary bike at gym, starting stationary flutter kicks in pool at 6 months    Written Home Exercises Provided: yes. Exercises were reviewed and Terri was able to demonstrate them prior to the end of the session.  Terri demonstrated good  understanding of the education provided. See EMR under Patient Instructions for exercises provided during therapy sessions    ASSESSMENT     Terri arrives status post ACL repair with quad tendon autograph and MQTFL " reconstruction with semitendinosis allograph. No buckling today. Better stability with single leg plyometric exercises. Continue with half plyometrics followed by half strengthening with appropriate fatigue.     Pt prognosis is Good.     Pt will continue to benefit from skilled outpatient physical therapy to address the deficits listed in the problem list box on initial evaluation, provide pt/family education and to maximize pt's level of independence in the home and community environment.     Pt's spiritual, cultural and educational needs considered and pt agreeable to plan of care and goals.     Anticipated barriers to physical therapy: chronic nature of symptoms    Goals:   Short Term Goals: 6 weeks   1.  Patient will demonstrate right knee ROM at least 5-0-120 degree. MET  2.  Patient will demonstrate right quad strength by performing 3x10 of active straight leg raise without lag.  MET  3.  Patient will report worst right knee pain < 4/10 to improve ADL performance. MET     Long Term Goals: 16 weeks   1.  Patient will demonstrate ability to perform > 12 sit <> stand transfers w/o UE use for improved ability to stand from low surfaces. MET  2.  Patient will demonstrate right quad strength via MicroFET of > 27 kg to improve transfers, gait and ADL performance. MET  3.  Patient will demonstrate to PT comprehensive understanding of each HEP component without verbal cueing. MET  4.  Patient will improve FOTO to < 43% to improve ADL performance. PROGRESSING; NOT MET  5.  Patient will demonstrate ability to negotiate a flight of stairs with reciprocal pattern, no handrail and no compensation or symptoms. PROGRESSING; NOT MET    PLAN     Cont with POC per ACL quad tendon protocol  Currently return to sport phase; plyometrics  BFR PRN  dry needling PRN    Shan Dolan, PT

## 2023-06-27 ENCOUNTER — CLINICAL SUPPORT (OUTPATIENT)
Dept: REHABILITATION | Facility: HOSPITAL | Age: 38
End: 2023-06-27
Payer: MEDICAID

## 2023-06-27 DIAGNOSIS — Z74.09 DECREASED FUNCTIONAL MOBILITY AND ENDURANCE: Primary | ICD-10-CM

## 2023-06-27 DIAGNOSIS — M62.81 QUADRICEPS WEAKNESS: ICD-10-CM

## 2023-06-27 PROCEDURE — 97110 THERAPEUTIC EXERCISES: CPT | Mod: PN | Performed by: PHYSICAL THERAPIST

## 2023-06-27 NOTE — PROGRESS NOTES
[facility]   Physical Therapy Treatment Note     Name: Terri Summers  Clinic Number: [id number]    Therapy Diagnosis:   Encounter Diagnoses   Name Primary?    Decreased functional mobility and endurance Yes    Quadriceps weakness        Physician: Shan Crystal MD    Visit Date: 6/27/2023  Physician Orders: PT Eval and Treat   Medical Diagnosis from Referral:   S83.004D (ICD-10-CM) - Traumatic dislocation of patella, right, subsequent encounter   S83.511A (ICD-10-CM) - Chronic rupture of ACL of right knee   Evaluation Date: 11/22/2022  Plan of Care Expiration: 7/7/23   Progress Note Due: 7/7/23  Visit # / Visits authorized: 27/40 (+16),   FOTO: performed 6/7/23 - goal met  PTA Visit: 0/5    Precautions: standard, post-op ACL with quad tendon autograph protocol; NO LOADED FLEXION PAST 90 x 4 months; May unlock hinged knee brace and WBAT.    *PRECAUTION ? Graft at weakest point during this period.  No impact loading activities such as jumping, running, pivoting, or cutting    Time In: 8:00 AM  Time Out: 8:54 AM  Total Billable Time: 54 minutes (TEx4) - Medicaid    SUBJECTIVE     Pt reports: she still feels it buckled 2-3 times per day, but feels like she has more control of it.  It's usually first getting up after sitting for a while.    She was compliant with her home exercise program.    Response to previous treatment: no problems.  Functional change: no longer using any AD or braces    Pain: 0/10  Location: right knee      OBJECTIVE     See re-assessment on 12/16/22    Date of surgery: 12/15/22 by Dr. Crystal  Procedure:  Right ACL reconstruction with quadriceps autograft   Right all-inside lateral meniscus repair  Right medial quadriceps tendon reconstruction with semitendinosis allograft     See UPOC on 6/7/23    Treatment     eTrri received the treatments listed below      **ALL BILLING AS TE PER MEDICAID GUIDELINES**     therapeutic exercises to develop strength, endurance, ROM,  "flexibility, posture, and core stabilization for minutes 56 including testing:    Active warmup: 50 ft x 2   Ankling, frankensteins, hamstring butt kickers      Plyometrics:   Lateral bounding: 2x30" (fast)  Lateral bounding for distance: 2x10 each  Double leg hops: 2x30"  Double leg square hops: 2x30" (1x each direction)  Ladder drills: (2 laps each)   - high knees 1 foot in each   - high knees 2 foot in each   - icky shuffle   - lateral in/in out/out  T drill on command: 45" 1x   2 legged triple hop for distance: 2 laps  1 legged triple hop for distance: 2 laps  1 legged hops: 2x15"  Squat jumps: 10x    Strengthening:  Good mornings: 10 pounds 2x10  Squat with 10 lb bar: 2x10  Eccentric Leg press: 30x, 7 plates, seat 5 (0-100 degrees) double leg, sled=5  Right leg press (power): 3x5, 9 plates (0-100 degree); sled=5  Split squats: 3x10 each    3 exercise circuit :  Right heel raise: 3x15  Lateral step downs: 6" step 3x8, right  Forward step downs: 3x8 right (6" step)    Not today:  Single leg deadlift: 2x10 each, 20# kettle bell  Rebounder single leg stance: 2x30'' on RLE   Pistol squats: 25" table 8x right; 24" table 8x; 23" table 5x        Patient Education and Home Exercises     Home Exercises Provided and Patient Education Provided   Education provided:   - use of and fitting of hinge brace  - benefits of NMES and TENS - units to purchase  - blood flow restriction therapy  - sitting with brace unlocked for a little at home  - risks and benefits of dry needling - signed consent on 4/17/23  - starting biking on stationary bike at gym, starting stationary flutter kicks in pool at 6 months    Written Home Exercises Provided: yes. Exercises were reviewed and Terri was able to demonstrate them prior to the end of the session.  Terri demonstrated good  understanding of the education provided. See EMR under Patient Instructions for exercises provided during therapy sessions    ASSESSMENT     Terri arrives status post " ACL repair with quad tendon autograph and MQTFL reconstruction with semitendinosis allograph. No buckling today. Better change of direction with T drill today. Initiated squat jumps with even weight shift and soft landing. Strength continues to improve with reports of mild buckling occasionally at home/work after sitting for a while. Appears to be nearing DC to updated home exercise program at end of POC.    Pt prognosis is Good.     Pt will continue to benefit from skilled outpatient physical therapy to address the deficits listed in the problem list box on initial evaluation, provide pt/family education and to maximize pt's level of independence in the home and community environment.     Pt's spiritual, cultural and educational needs considered and pt agreeable to plan of care and goals.     Anticipated barriers to physical therapy: chronic nature of symptoms    Goals:   Short Term Goals: 6 weeks   1.  Patient will demonstrate right knee ROM at least 5-0-120 degree. MET  2.  Patient will demonstrate right quad strength by performing 3x10 of active straight leg raise without lag.  MET  3.  Patient will report worst right knee pain < 4/10 to improve ADL performance. MET     Long Term Goals: 16 weeks   1.  Patient will demonstrate ability to perform > 12 sit <> stand transfers w/o UE use for improved ability to stand from low surfaces. MET  2.  Patient will demonstrate right quad strength via MicroFET of > 27 kg to improve transfers, gait and ADL performance. MET  3.  Patient will demonstrate to PT comprehensive understanding of each HEP component without verbal cueing. MET  4.  Patient will improve FOTO to < 43% to improve ADL performance. MET  5.  Patient will demonstrate ability to negotiate a flight of stairs with reciprocal pattern, no handrail and no compensation or symptoms.  MET    PLAN     Cont with POC per ACL quad tendon protocol  Currently return to sport phase; plyometrics    Shan Dolan,  PT

## 2023-06-29 ENCOUNTER — CLINICAL SUPPORT (OUTPATIENT)
Dept: REHABILITATION | Facility: HOSPITAL | Age: 38
End: 2023-06-29
Payer: MEDICAID

## 2023-06-29 DIAGNOSIS — M62.81 QUADRICEPS WEAKNESS: ICD-10-CM

## 2023-06-29 DIAGNOSIS — Z74.09 DECREASED FUNCTIONAL MOBILITY AND ENDURANCE: Primary | ICD-10-CM

## 2023-06-29 PROCEDURE — 97110 THERAPEUTIC EXERCISES: CPT | Mod: PN | Performed by: PHYSICAL THERAPIST

## 2023-06-29 NOTE — PROGRESS NOTES
[facility]   Physical Therapy Treatment Note     Name: Terri Summers  Clinic Number: [id number]    Therapy Diagnosis:   Encounter Diagnoses   Name Primary?    Decreased functional mobility and endurance Yes    Quadriceps weakness        Physician: Shan Crystal MD    Visit Date: 6/29/2023  Physician Orders: PT Eval and Treat   Medical Diagnosis from Referral:   S83.004D (ICD-10-CM) - Traumatic dislocation of patella, right, subsequent encounter   S83.511A (ICD-10-CM) - Chronic rupture of ACL of right knee   Evaluation Date: 11/22/2022  Plan of Care Expiration: 7/7/23   Progress Note Due: 7/7/23  Visit # / Visits authorized: 28/40 (+16),   FOTO: performed 6/7/23 - goal met  PTA Visit: 0/5    Precautions: standard, post-op ACL with quad tendon autograph protocol; NO LOADED FLEXION PAST 90 x 4 months; May unlock hinged knee brace and WBAT.    *PRECAUTION ? Graft at weakest point during this period.  No impact loading activities such as jumping, running, pivoting, or cutting    Time In: 9:00 AM  Time Out: 9:54 AM  Total Billable Time: 42 + 10 e-stim minutes (TEx3) - Medicaid    SUBJECTIVE     Pt reports: she had increased pain at work yesterday (medial knee)    She was compliant with her home exercise program.    Response to previous treatment: increased pain  Functional change: no longer using any AD or braces    Pain: 4/10  Location: right knee      OBJECTIVE     See re-assessment on 12/16/22    Date of surgery: 12/15/22 by Dr. Crystal  Procedure:  Right ACL reconstruction with quadriceps autograft   Right all-inside lateral meniscus repair  Right medial quadriceps tendon reconstruction with semitendinosis allograft     See UPOC on 6/7/23    Treatment     Terri received the treatments listed below      **ALL BILLING AS TE PER MEDICAID GUIDELINES**     therapeutic exercises to develop strength, endurance, ROM, flexibility, posture, and core stabilization for minutes 56 including  "testing:    Active warmup: 50 ft x 2   Kaushalling, augustineensteins, hamstring butt kickers      Plyometrics:   Lateral bounding: 2x30" (fast)  Lateral bounding for distance: 1x10 each (stopped due to pain)  Double leg hops: 2x30"  Double leg square hops: 2x30" (1x each direction)  Ladder drills: (2 laps each)   - high knees 1 foot in each   - high knees 2 foot in each   - icky shuffle   - lateral in/in out/out      Strengthening:  Good mornings: 10 pounds 2x10  Squat with 10 lb bar: 2x10  Eccentric Leg press: 30x, 7 plates, seat 5 (0-100 degrees) double leg, sled=5  Right leg press (power): 3x5, 9 plates (0-100 degree); sled=5  Split squats: 3x10 each    3 exercise circuit :  Right heel raise: 3x15  Lateral step downs: 6" step 3x8, right  Forward step downs: 3x8 right (6" step)    Not today:  Single leg deadlift: 2x10 each, 20# kettle bell  Rebounder single leg stance: 2x30'' on RLE   Pistol squats: 25" table 8x right; 24" table 8x; 23" table 5x    T drill on command: 45" 1x (held today)  2 legged triple hop for distance: 2 laps  1 legged triple hop for distance: 2 laps  1 legged hops: 2x15"  Squat jumps: 10x    Patient Education and Home Exercises     Home Exercises Provided and Patient Education Provided   Education provided:   - use of and fitting of hinge brace  - benefits of NMES and TENS - units to purchase  - blood flow restriction therapy  - sitting with brace unlocked for a little at home  - risks and benefits of dry needling - signed consent on 4/17/23  - starting biking on stationary bike at gym, starting stationary flutter kicks in pool at 6 months    Written Home Exercises Provided: yes. Exercises were reviewed and Terri was able to demonstrate them prior to the end of the session.  Terri demonstrated good  understanding of the education provided. See EMR under Patient Instructions for exercises provided during therapy sessions    ASSESSMENT     Terri arrives status post ACL repair with quad tendon autograph " "and MQTFL reconstruction with semitendinosis allograph. Increased right knee pain upon arrival and especially with plyometrics today, limiting treatment. Pain located at medial knee. Able to complete all strengthening exercises with mild inc in pain.     Terri was agreeable to dry needling by a certified dry needling PT and signed a consent prior to treatment after being made aware of risks. 2x50 mm needles were inserted into the vastus medialis, vastus lateralis; 2x25 mm needles into the medial and lateral "eye" of the patella; 1x40 mm needle in the medial supracondylar ridge; 1x30 mm needle in the tibialis anterior. Periosteal pecking and winding for grasp were performed. Electrical stimulation was applied to these needles at 4 Hz and a pulse of 250 µseconds for 10 minutes with a group home check. Afterwards, needles were removed and placed into a sharps container. Terri reported a good response to treatment.     Pt prognosis is Good.     Pt will continue to benefit from skilled outpatient physical therapy to address the deficits listed in the problem list box on initial evaluation, provide pt/family education and to maximize pt's level of independence in the home and community environment.     Pt's spiritual, cultural and educational needs considered and pt agreeable to plan of care and goals.     Anticipated barriers to physical therapy: chronic nature of symptoms    Goals:   Short Term Goals: 6 weeks   1.  Patient will demonstrate right knee ROM at least 5-0-120 degree. MET  2.  Patient will demonstrate right quad strength by performing 3x10 of active straight leg raise without lag.  MET  3.  Patient will report worst right knee pain < 4/10 to improve ADL performance. MET     Long Term Goals: 16 weeks   1.  Patient will demonstrate ability to perform > 12 sit <> stand transfers w/o UE use for improved ability to stand from low surfaces. MET  2.  Patient will demonstrate right quad strength via MicroFET of > 27 kg " to improve transfers, gait and ADL performance. MET  3.  Patient will demonstrate to PT comprehensive understanding of each HEP component without verbal cueing. MET  4.  Patient will improve FOTO to < 43% to improve ADL performance. MET  5.  Patient will demonstrate ability to negotiate a flight of stairs with reciprocal pattern, no handrail and no compensation or symptoms.  MET    PLAN     Cont with POC per ACL quad tendon protocol  Currently return to sport phase; plyometrics    Shan Dolan, PT

## 2023-07-05 ENCOUNTER — CLINICAL SUPPORT (OUTPATIENT)
Dept: REHABILITATION | Facility: HOSPITAL | Age: 38
End: 2023-07-05
Payer: MEDICAID

## 2023-07-05 DIAGNOSIS — M62.81 QUADRICEPS WEAKNESS: ICD-10-CM

## 2023-07-05 DIAGNOSIS — Z74.09 DECREASED FUNCTIONAL MOBILITY AND ENDURANCE: Primary | ICD-10-CM

## 2023-07-05 PROCEDURE — 97110 THERAPEUTIC EXERCISES: CPT | Mod: PN | Performed by: PHYSICAL THERAPIST

## 2023-07-05 NOTE — PROGRESS NOTES
[facility]   Physical Therapy Treatment Note     Name: Terri Summers  Clinic Number: [id number]    Therapy Diagnosis:   Encounter Diagnoses   Name Primary?    Decreased functional mobility and endurance Yes    Quadriceps weakness        Physician: Shan Crystal MD    Visit Date: 7/5/2023  Physician Orders: PT Eval and Treat   Medical Diagnosis from Referral:   S83.004D (ICD-10-CM) - Traumatic dislocation of patella, right, subsequent encounter   S83.511A (ICD-10-CM) - Chronic rupture of ACL of right knee   Evaluation Date: 11/22/2022  Plan of Care Expiration: 7/7/23   Progress Note Due: 7/7/23  Visit # / Visits authorized: 29/40 (+16),   FOTO: performed 6/7/23 - goal met  PTA Visit: 0/5    Precautions: standard, post-op ACL with quad tendon autograph protocol; NO LOADED FLEXION PAST 90 x 4 months; May unlock hinged knee brace and WBAT.    *PRECAUTION ? Graft at weakest point during this period.  No impact loading activities such as jumping, running, pivoting, or cutting    Time In: 8:04 AM  Time Out: 8:52 AM  Total Billable Time: 46 minutes (TEx3) - Medicaid    SUBJECTIVE     Pt reports: her knee is better without any pain.    She was compliant with her home exercise program.    Response to previous treatment: increased pain  Functional change: no longer using any AD or braces    Pain: 0/10  Location: right knee      OBJECTIVE     See re-assessment on 12/16/22    Date of surgery: 12/15/22 by Dr. Crystal  Procedure:  Right ACL reconstruction with quadriceps autograft   Right all-inside lateral meniscus repair  Right medial quadriceps tendon reconstruction with semitendinosis allograft     See UPOC on 6/7/23    Treatment     Terri received the treatments listed below      **ALL BILLING AS TE PER MEDICAID GUIDELINES**     therapeutic exercises to develop strength, endurance, ROM, flexibility, posture, and core stabilization for minutes 46 including testing:    Active warmup: 50 ft x  "2   Kaushalling, augustineensteins, hamstring butt kickers      Plyometrics:   Double leg hops: 2x30"  Double leg square hops: 2x30" (1x each direction)  Squat jumps: 10x  2 legged triple hop for distance: 2 laps  Ladder drills: (2 laps each)   - high knees 1 foot in each   - high knees 2 foot in each   - icky shuffle   - lateral in/in out/out      Strengthening:  Good mornings: 10 pounds 2x10  Squat with 10 lb bar: 2x10  Eccentric Leg press: 30x, 7 plates, seat 5 (0-100 degrees) double leg, sled=5  Right leg press (power): 3x5, 9 plates (0-100 degree); sled=5  Split squats: 3x10 each    3 exercise circuit :  Right heel raise: 3x15  Lateral step downs: 6" step 3x10, right  Forward step downs: 3x10 right (6" step)    Not today:  Lateral bounding: 2x30" (fast)  Lateral bounding for distance: 1x10 each (stopped due to pain)  Single leg deadlift: 2x10 each, 20# kettle bell  Rebounder single leg stance: 2x30'' on RLE   Pistol squats: 25" table 8x right; 24" table 8x; 23" table 5x    T drill on command: 45" 1x (held today)  1 legged triple hop for distance: 2 laps  1 legged hops: 2x15"      Patient Education and Home Exercises     Home Exercises Provided and Patient Education Provided   Education provided:   - use of and fitting of hinge brace  - benefits of NMES and TENS - units to purchase  - blood flow restriction therapy  - sitting with brace unlocked for a little at home  - risks and benefits of dry needling - signed consent on 4/17/23  - starting biking on stationary bike at gym, starting stationary flutter kicks in pool at 6 months    Written Home Exercises Provided: yes. Exercises were reviewed and Terri was able to demonstrate them prior to the end of the session.  Terri demonstrated good  understanding of the education provided. See EMR under Patient Instructions for exercises provided during therapy sessions    ASSESSMENT     Terri arrives status post ACL repair with quad tendon autograph and MQTFL reconstruction with " semitendinosis allograph. Although she arrived without pain today, she immediately started feeling discomfort with lateral bounding. Avoided any lateral movements after that and no more pain reported. Good response to dry needling previous visit. Anticipate discharge next visit.    Pt prognosis is Good.     Pt will continue to benefit from skilled outpatient physical therapy to address the deficits listed in the problem list box on initial evaluation, provide pt/family education and to maximize pt's level of independence in the home and community environment.     Pt's spiritual, cultural and educational needs considered and pt agreeable to plan of care and goals.     Anticipated barriers to physical therapy: chronic nature of symptoms    Goals:   Short Term Goals: 6 weeks   1.  Patient will demonstrate right knee ROM at least 5-0-120 degree. MET  2.  Patient will demonstrate right quad strength by performing 3x10 of active straight leg raise without lag.  MET  3.  Patient will report worst right knee pain < 4/10 to improve ADL performance. MET     Long Term Goals: 16 weeks   1.  Patient will demonstrate ability to perform > 12 sit <> stand transfers w/o UE use for improved ability to stand from low surfaces. MET  2.  Patient will demonstrate right quad strength via MicroFET of > 27 kg to improve transfers, gait and ADL performance. MET  3.  Patient will demonstrate to PT comprehensive understanding of each HEP component without verbal cueing. MET  4.  Patient will improve FOTO to < 43% to improve ADL performance. MET  5.  Patient will demonstrate ability to negotiate a flight of stairs with reciprocal pattern, no handrail and no compensation or symptoms.  MET    PLAN     Cont with POC per ACL quad tendon protocol  Currently return to sport phase; plyometrics    Shan Dolan, PT

## 2023-07-07 ENCOUNTER — CLINICAL SUPPORT (OUTPATIENT)
Dept: REHABILITATION | Facility: HOSPITAL | Age: 38
End: 2023-07-07
Payer: MEDICAID

## 2023-07-07 DIAGNOSIS — Z74.09 DECREASED FUNCTIONAL MOBILITY AND ENDURANCE: Primary | ICD-10-CM

## 2023-07-07 DIAGNOSIS — M62.81 QUADRICEPS WEAKNESS: ICD-10-CM

## 2023-07-07 PROCEDURE — 97110 THERAPEUTIC EXERCISES: CPT | Mod: PN | Performed by: PHYSICAL THERAPIST

## 2023-07-07 NOTE — PROGRESS NOTES
[facility]   Physical Therapy DISCHARGE Note     Name: Terri Summers  Clinic Number: [id number]    Therapy Diagnosis:   Encounter Diagnoses   Name Primary?    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    *PRECAUTION ? Graft at weakest point during this period.  No impact loading activities such as jumping, running, pivoting, or cutting    Time In: 8:00 AM  Time Out: 8:50 AM  Total Billable Time: 40 minutes (TEx3) - Medicaid    SUBJECTIVE     Pt reports: her knee is better without significant pain. She would like one more dry needling treatment before discharge. She does have access to a gym.    She was compliant with her home exercise program.    Response to previous treatment: increased pain  Functional change: no longer using any AD or braces    Pain: 3/10  Location: right knee      OBJECTIVE     See re-assessment on 12/16/22    Date of surgery: 12/15/22 by Dr. Crystal  Procedure:  Right ACL reconstruction with quadriceps autograft   Right all-inside lateral meniscus repair  Right medial quadriceps tendon reconstruction with semitendinosis allograft     7/7/23  Update:      All below testing performed in seated position. Gait belt used for quadriceps testing.     Lower extremity strength with Win[employer] handheld dynamometer Right 6/7/23 7/7/23 Left  "Pain/dysfunction with movement   (approx 4 sec hold w/ max contraction)   Hip flexion 17.9 kg  22.1 kg  19.3 kg      Hip abduction  4+/5    5/5     Quadriceps 18.4 kg  30.1 28.4 30.1 kg (34.9)     Hamstrings 12.9 kg  22.4 kg 21.9 19 kg         Right knee flexion AROM: 5-0-135 degree      30" sit <> stand: 17x   Squat: even weight shift, full depth  Single leg stance: 1 minutes without upper extremity   Stairs: reciprocal, no handrails, no difficulty  Gait: very mild decreased stance time on right   Lateral step down test: negative left, mildly positive right (10 reps)    Treatment     Terri received the treatments listed below      **ALL BILLING AS TE PER MEDICAID GUIDELINES**     therapeutic exercises to develop strength, endurance, ROM, flexibility, posture, and core stabilization for 40 minutes including testing:    Objective measures  Education regarding progression of home exercise program  Dry needling to right knee with e-stim    Patient Education and Home Exercises     Home Exercises Provided and Patient Education Provided   Education provided:   - use of and fitting of hinge brace  - benefits of NMES and TENS - units to purchase  - blood flow restriction therapy  - sitting with brace unlocked for a little at home  - risks and benefits of dry needling - signed consent on 4/17/23  - starting biking on stationary bike at gym, starting stationary flutter kicks in pool at 6 months    Written Home Exercises Provided: yes. Exercises were reviewed and Terri was able to demonstrate them prior to the end of the session.  Terri demonstrated good  understanding of the education provided. See EMR under Patient Instructions for exercises provided during therapy sessions    ASSESSMENT     Terri arrives status post ACL repair with quad tendon autograph and MQTFL reconstruction with semitendinosis allograph. Continue to avoid cutting, especially laterally, to avoid increase in pain. She has plateaued in lower extremity " strength, but overall has progressed well. Overall progress was delayed by her fall, but she has responded very well to dry needling to reduce pain. Her home exercise program was updated and she demonstrates a good understanding and compliance. She has met all goals and is appropriate for discharge.     Pt prognosis is Good.     Pt will continue to benefit from skilled outpatient physical therapy to address the deficits listed in the problem list box on initial evaluation, provide pt/family education and to maximize pt's level of independence in the home and community environment.     Pt's spiritual, cultural and educational needs considered and pt agreeable to plan of care and goals.     Anticipated barriers to physical therapy: chronic nature of symptoms    Goals:   Short Term Goals: 6 weeks   1.  Patient will demonstrate right knee ROM at least 5-0-120 degree. MET  2.  Patient will demonstrate right quad strength by performing 3x10 of active straight leg raise without lag.  MET  3.  Patient will report worst right knee pain < 4/10 to improve ADL performance. MET     Long Term Goals: 16 weeks   1.  Patient will demonstrate ability to perform > 12 sit <> stand transfers w/o UE use for improved ability to stand from low surfaces. MET  2.  Patient will demonstrate right quad strength via MicroFET of > 27 kg to improve transfers, gait and ADL performance. MET  3.  Patient will demonstrate to PT comprehensive understanding of each HEP component without verbal cueing. MET  4.  Patient will improve FOTO to < 43% to improve ADL performance. MET  5.  Patient will demonstrate ability to negotiate a flight of stairs with reciprocal pattern, no handrail and no compensation or symptoms.  MET    PLAN     DISCHARGE to HEP    Shan Dolan, PT

## 2023-08-08 ENCOUNTER — OFFICE VISIT (OUTPATIENT)
Dept: PSYCHIATRY | Facility: CLINIC | Age: 38
End: 2023-08-08
Payer: MEDICAID

## 2023-08-08 DIAGNOSIS — F41.0 GENERALIZED ANXIETY DISORDER WITH PANIC ATTACKS: ICD-10-CM

## 2023-08-08 DIAGNOSIS — F41.1 GENERALIZED ANXIETY DISORDER WITH PANIC ATTACKS: ICD-10-CM

## 2023-08-08 DIAGNOSIS — F90.2 ADHD (ATTENTION DEFICIT HYPERACTIVITY DISORDER), COMBINED TYPE: ICD-10-CM

## 2023-08-08 PROCEDURE — 99213 OFFICE O/P EST LOW 20 MIN: CPT | Mod: SA,HB,95, | Performed by: NURSE PRACTITIONER

## 2023-08-08 PROCEDURE — 99213 PR OFFICE/OUTPT VISIT, EST, LEVL III, 20-29 MIN: ICD-10-PCS | Mod: SA,HB,95, | Performed by: NURSE PRACTITIONER

## 2023-08-08 RX ORDER — DEXTROAMPHETAMINE SACCHARATE, AMPHETAMINE ASPARTATE, DEXTROAMPHETAMINE SULFATE AND AMPHETAMINE SULFATE 7.5; 7.5; 7.5; 7.5 MG/1; MG/1; MG/1; MG/1
30 TABLET ORAL 2 TIMES DAILY
Qty: 60 TABLET | Refills: 0 | Status: SHIPPED | OUTPATIENT
Start: 2023-08-20 | End: 2023-11-20 | Stop reason: SDUPTHER

## 2023-08-08 RX ORDER — ALPRAZOLAM 0.5 MG/1
0.5 TABLET ORAL DAILY PRN
Qty: 30 TABLET | Refills: 5 | Status: SHIPPED | OUTPATIENT
Start: 2023-08-08 | End: 2023-11-27 | Stop reason: SDUPTHER

## 2023-08-08 RX ORDER — DEXTROAMPHETAMINE SACCHARATE, AMPHETAMINE ASPARTATE, DEXTROAMPHETAMINE SULFATE AND AMPHETAMINE SULFATE 7.5; 7.5; 7.5; 7.5 MG/1; MG/1; MG/1; MG/1
30 TABLET ORAL 2 TIMES DAILY
Qty: 60 TABLET | Refills: 0 | Status: SHIPPED | OUTPATIENT
Start: 2023-10-20

## 2023-08-08 RX ORDER — DEXTROAMPHETAMINE SACCHARATE, AMPHETAMINE ASPARTATE, DEXTROAMPHETAMINE SULFATE AND AMPHETAMINE SULFATE 7.5; 7.5; 7.5; 7.5 MG/1; MG/1; MG/1; MG/1
30 TABLET ORAL 2 TIMES DAILY
Qty: 60 TABLET | Refills: 0 | Status: SHIPPED | OUTPATIENT
Start: 2023-09-20 | End: 2023-11-20 | Stop reason: SDUPTHER

## 2023-08-08 NOTE — PROGRESS NOTES
Outpatient Psychiatry Follow-Up Visit (MD/NP)    8/8/2023    Clinical Status of Patient:  Outpatient (Ambulatory)    Chief Complaint:  Terri Summers is a 37 y.o. female who presents today for follow-up of attention problems.  Met with patient.      Last visit was:2/20/23  The patient location is: home  The chief complaint leading to consultation is: attention problems    Visit type: audiovisual    Face to Face time with patient: 30  30 minutes of total time spent on the encounter, which includes face to face time and non-face to face time preparing to see the patient (eg, review of tests), Obtaining and/or reviewing separately obtained history, Documenting clinical information in the electronic or other health record, Independently interpreting results (not separately reported) and communicating results to the patient/family/caregiver, or Care coordination (not separately reported).     Each patient to whom he or she provides medical services by telemedicine is:  (1) informed of the relationship between the physician and patient and the respective role of any other health care provider with respect to management of the patient; and (2) notified that he or she may decline to receive medical services by telemedicine and may withdraw from such care at any time.    Interval History and Content of Current Session:  Current Psychiatric Medications/changes  Continue Adderall 30 mg po BID  Continue Xanax 0.5 mg po daily PRN panic attacks    Virtual Visit: Pt presents bright affect and euthymic mood. Reports improvement in anxiety and ADH symptoms with current medications. Reports that she is doing well. Denies SI/HI/AVH. Will continue meds    Psychotherapy:  Target symptoms: distractability, lack of focus  Why chosen therapy is appropriate versus another modality: relevant to diagnosis  Outcome monitoring methods: self-report  Therapeutic intervention type: insight oriented psychotherapy  Topics discussed/themes: building  skills sets for symptom management, symptom recognition  The patient's response to the intervention is accepting. The patient's progress toward treatment goals is good.   Duration of intervention: 14 minutes.    Review of Systems   PSYCHIATRIC: Pertinant items are noted in the narrative.  CONSTITUTIONAL: No weight gain or loss.   MUSCULOSKELETAL: No pain or stiffness of the joints.  NEUROLOGIC: No weakness, sensory changes, seizures, confusion, memory loss, tremor or other abnormal movements.  ENDOCRINE: No polydipsia or polyuria.  INTEGUMENTARY: No rashes or lacerations.  EYES: No exophthalmos, jaundice or blindness.  ENT: No dizziness, tinnitus or hearing loss.  RESPIRATORY: No shortness of breath.  CARDIOVASCULAR: No tachycardia or chest pain.  GASTROINTESTINAL: No nausea, vomiting, pain, constipation or diarrhea.  GENITOURINARY: No frequency, dysuria or sexual dysfunction.  HEMATOLOGIC/LYMPHATIC: No excessive bleeding, prolonged or excessive bleeding after dental extraction/injury.  ALLERGIC/IMMUNOLOGIC: No allergic response to materials, foods or animals at this time.    Past Medical, Family and Social History: The patient's past medical, family and social history have been reviewed and updated as appropriate within the electronic medical record - see encounter notes.    Compliance: no    Side effects: None    Risk Parameters:  Patient reports no suicidal ideation  Patient reports no homicidal ideation  Patient reports no self-injurious behavior  Patient reports no violent behavior    Exam (detailed: at least 9 elements; comprehensive: all 15 elements)   Constitutional  Vitals:  Most recent vital signs, dated greater than 90 days prior to this appointment, were not reviewed.   There were no vitals filed for this visit.     General:  unremarkable, age appropriate     Musculoskeletal  Muscle Strength/Tone:  no tremor   Gait & Station:  non-ataxic     Psychiatric  Speech:  no latency; no press   Mood & Affect:   steady  congruent and appropriate   Thought Process:  normal and logical   Associations:  intact   Thought Content:  normal, no suicidality, no homicidality, delusions, or paranoia   Insight:  intact   Judgement: behavior is adequate to circumstances   Orientation:  grossly intact   Memory: intact for content of interview   Language: grossly intact   Attention Span & Concentration:  able to focus   Fund of Knowledge:  intact and appropriate to age and level of education     Assessment and Diagnosis   Status/Progress: Based on the examination today, the patient's problem(s) is/are improved.  New problems have not been presented today.   Co-morbidities and Lack of compliance are not complicating management of the primary condition.  There are no active rule-out diagnoses for this patient at this time.     General Impression:       ICD-10-CM ICD-9-CM   1. Generalized anxiety disorder with panic attacks  F41.1 300.02    F41.0 300.01   2. ADHD (attention deficit hyperactivity disorder), combined type  F90.2 314.01       Intervention/Counseling/Treatment Plan   Medication Management: The risks and benefits of medication were discussed with the patient.  Continue Adderall 30 mg po BID  Continue Xanax 0.5 mg po daily PRN panic attacks    Return to Clinic: 3 months    Risks, benefits, side effects and alternative treatments discussed with patient. Patient agrees with the current plan as documented.  Encouraged Patient to keep future appointments.  Take medications as prescribed and abstain from substance abuse.  Pt to present to ED for thoughts to harm herself or others

## 2023-08-29 ENCOUNTER — OFFICE VISIT (OUTPATIENT)
Dept: URGENT CARE | Facility: CLINIC | Age: 38
End: 2023-08-29
Payer: MEDICAID

## 2023-08-29 VITALS
DIASTOLIC BLOOD PRESSURE: 94 MMHG | TEMPERATURE: 98 F | HEART RATE: 94 BPM | HEIGHT: 66 IN | WEIGHT: 181 LBS | RESPIRATION RATE: 20 BRPM | OXYGEN SATURATION: 97 % | BODY MASS INDEX: 29.09 KG/M2 | SYSTOLIC BLOOD PRESSURE: 142 MMHG

## 2023-08-29 NOTE — PROGRESS NOTES
"Subjective:      Patient ID: Terri Summers is a 37 y.o. female.    Vitals:  height is 5' 6" (1.676 m) and weight is 82.1 kg (181 lb). Her temperature is 97.8 °F (36.6 °C). Her blood pressure is 142/94 (abnormal) and her pulse is 94. Her respiration is 20 and oxygen saturation is 97%.     Chief Complaint: Sore Throat    Pt left without seeing provider due to family emergency and could not wait    Sore Throat       ROS   Objective:     Physical Exam    Assessment:     No diagnosis found.    Plan:       There are no diagnoses linked to this encounter.    Pt left without seeing provider due to family emergency and could not wait            This encounter was created in error - please disregard.  "

## 2023-08-30 ENCOUNTER — OFFICE VISIT (OUTPATIENT)
Dept: ORTHOPEDICS | Facility: CLINIC | Age: 38
End: 2023-08-30
Payer: MEDICAID

## 2023-08-30 VITALS
HEIGHT: 66 IN | SYSTOLIC BLOOD PRESSURE: 115 MMHG | DIASTOLIC BLOOD PRESSURE: 78 MMHG | WEIGHT: 194.69 LBS | BODY MASS INDEX: 31.29 KG/M2 | HEART RATE: 82 BPM

## 2023-08-30 DIAGNOSIS — Z98.890 S/P ACL RECONSTRUCTION: Primary | ICD-10-CM

## 2023-08-30 DIAGNOSIS — Z98.890 S/P LATERAL MENISCUS REPAIR OF RIGHT KNEE: ICD-10-CM

## 2023-08-30 PROCEDURE — 3074F SYST BP LT 130 MM HG: CPT | Mod: CPTII,,, | Performed by: ORTHOPAEDIC SURGERY

## 2023-08-30 PROCEDURE — 99213 OFFICE O/P EST LOW 20 MIN: CPT | Mod: S$PBB,,, | Performed by: ORTHOPAEDIC SURGERY

## 2023-08-30 PROCEDURE — 3078F DIAST BP <80 MM HG: CPT | Mod: CPTII,,, | Performed by: ORTHOPAEDIC SURGERY

## 2023-08-30 PROCEDURE — 1160F RVW MEDS BY RX/DR IN RCRD: CPT | Mod: CPTII,,, | Performed by: ORTHOPAEDIC SURGERY

## 2023-08-30 PROCEDURE — 3074F PR MOST RECENT SYSTOLIC BLOOD PRESSURE < 130 MM HG: ICD-10-PCS | Mod: CPTII,,, | Performed by: ORTHOPAEDIC SURGERY

## 2023-08-30 PROCEDURE — 99999 PR PBB SHADOW E&M-EST. PATIENT-LVL III: ICD-10-PCS | Mod: PBBFAC,,, | Performed by: ORTHOPAEDIC SURGERY

## 2023-08-30 PROCEDURE — 1159F PR MEDICATION LIST DOCUMENTED IN MEDICAL RECORD: ICD-10-PCS | Mod: CPTII,,, | Performed by: ORTHOPAEDIC SURGERY

## 2023-08-30 PROCEDURE — 3008F PR BODY MASS INDEX (BMI) DOCUMENTED: ICD-10-PCS | Mod: CPTII,,, | Performed by: ORTHOPAEDIC SURGERY

## 2023-08-30 PROCEDURE — 1159F MED LIST DOCD IN RCRD: CPT | Mod: CPTII,,, | Performed by: ORTHOPAEDIC SURGERY

## 2023-08-30 PROCEDURE — 99213 PR OFFICE/OUTPT VISIT, EST, LEVL III, 20-29 MIN: ICD-10-PCS | Mod: S$PBB,,, | Performed by: ORTHOPAEDIC SURGERY

## 2023-08-30 PROCEDURE — 99213 OFFICE O/P EST LOW 20 MIN: CPT | Mod: PBBFAC,PN | Performed by: ORTHOPAEDIC SURGERY

## 2023-08-30 PROCEDURE — 3078F PR MOST RECENT DIASTOLIC BLOOD PRESSURE < 80 MM HG: ICD-10-PCS | Mod: CPTII,,, | Performed by: ORTHOPAEDIC SURGERY

## 2023-08-30 PROCEDURE — 3008F BODY MASS INDEX DOCD: CPT | Mod: CPTII,,, | Performed by: ORTHOPAEDIC SURGERY

## 2023-08-30 PROCEDURE — 99999 PR PBB SHADOW E&M-EST. PATIENT-LVL III: CPT | Mod: PBBFAC,,, | Performed by: ORTHOPAEDIC SURGERY

## 2023-08-30 PROCEDURE — 1160F PR REVIEW ALL MEDS BY PRESCRIBER/CLIN PHARMACIST DOCUMENTED: ICD-10-PCS | Mod: CPTII,,, | Performed by: ORTHOPAEDIC SURGERY

## 2023-08-30 RX ORDER — LOSARTAN POTASSIUM 50 MG/1
1 TABLET ORAL DAILY
COMMUNITY

## 2023-08-30 NOTE — PROGRESS NOTES
"Patient ID:   Terri Summers is a 37 y.o. female.    Chief Complaint:   8m 15d s/p R ACLR with quad autograft, MQTFL reconstruction with hamstring allograft, LM repair    HPI:   Patient is returning for evaluation of her right knee. She has no complaints. Pain is much better since her last visit. She states that PT has released her. She denies any giving way of the knee.     Medications:    Current Outpatient Medications:     acetaminophen (TYLENOL) 325 MG tablet, Take 325 mg by mouth every 6 (six) hours as needed for Pain., Disp: , Rfl:     ALPRAZolam (XANAX) 0.5 MG tablet, Take 1 tablet (0.5 mg total) by mouth daily as needed for Anxiety., Disp: 30 tablet, Rfl: 5    [START ON 9/20/2023] dextroamphetamine-amphetamine (ADDERALL) 30 mg Tab, Take 1 tablet (30 mg total) by mouth 2 (two) times daily., Disp: 60 tablet, Rfl: 0    dextroamphetamine-amphetamine 30 mg Tab, Take 1 tablet (30 mg total) by mouth 2 (two) times daily., Disp: 60 tablet, Rfl: 0    [START ON 10/20/2023] dextroamphetamine-amphetamine 30 mg Tab, Take 1 tablet (30 mg total) by mouth 2 (two) times daily., Disp: 60 tablet, Rfl: 0    ergocalciferol (ERGOCALCIFEROL) 50,000 unit Cap, Take 50,000 Units by mouth every 7 days., Disp: , Rfl:     losartan (COZAAR) 50 MG tablet, Take 1 tablet by mouth once daily., Disp: , Rfl:     meloxicam (MOBIC) 7.5 MG tablet, Take 1 tablet (7.5 mg total) by mouth once daily., Disp: 28 tablet, Rfl: 0    traMADoL (ULTRAM) 50 mg tablet, Take 1 tablet (50 mg total) by mouth every 6 (six) hours as needed for Pain., Disp: 20 tablet, Rfl: 0    Allergies:  Review of patient's allergies indicates:   Allergen Reactions    Hydrochlorothiazide        Vitals:  /78   Pulse 82   Ht 5' 6" (1.676 m)   Wt 88.3 kg (194 lb 10.7 oz)   BMI 31.42 kg/m²     Physical Examination:  Ortho Exam   Right knee exam:  No effusion  Two quadrants of lateral patellar translation  ROM: 0-135  Lachmans 1A. Negative pivot shift.   No joint line " tenderness    Assessment:  1. S/P ACL reconstruction    2. S/P lateral meniscus repair of right knee      Plan:  Patient will continue to work on strengthening of her right lower extremity. She will return as needed.        No follow-ups on file.

## 2023-10-31 ENCOUNTER — PATIENT MESSAGE (OUTPATIENT)
Dept: OBSTETRICS AND GYNECOLOGY | Facility: CLINIC | Age: 38
End: 2023-10-31
Payer: MEDICAID

## 2023-11-16 ENCOUNTER — HOSPITAL ENCOUNTER (EMERGENCY)
Facility: HOSPITAL | Age: 38
Discharge: HOME | End: 2023-11-16
Attending: EMERGENCY MEDICINE
Payer: MEDICAID

## 2023-11-16 VITALS
TEMPERATURE: 98 F | OXYGEN SATURATION: 99 % | HEART RATE: 102 BPM | BODY MASS INDEX: 27.28 KG/M2 | WEIGHT: 180 LBS | RESPIRATION RATE: 20 BRPM | DIASTOLIC BLOOD PRESSURE: 93 MMHG | SYSTOLIC BLOOD PRESSURE: 145 MMHG | HEIGHT: 68 IN

## 2023-11-16 DIAGNOSIS — J11.1 INFLUENZA: Primary | ICD-10-CM

## 2023-11-16 LAB
B-HCG UR QL: NEGATIVE
CTP QC/QA: YES
CTP QC/QA: YES
POC MOLECULAR INFLUENZA A AGN: NEGATIVE
POC MOLECULAR INFLUENZA B AGN: POSITIVE

## 2023-11-16 PROCEDURE — 81025 URINE PREGNANCY TEST: CPT | Performed by: EMERGENCY MEDICINE

## 2023-11-16 PROCEDURE — 87502 INFLUENZA DNA AMP PROBE: CPT

## 2023-11-16 PROCEDURE — 99284 EMERGENCY DEPT VISIT MOD MDM: CPT

## 2023-11-16 RX ORDER — BENZONATATE 100 MG/1
100 CAPSULE ORAL 3 TIMES DAILY PRN
Qty: 21 CAPSULE | Refills: 0 | Status: SHIPPED | OUTPATIENT
Start: 2023-11-16 | End: 2023-11-23

## 2023-11-16 RX ORDER — CHLORHEXIDINE GLUCONATE ORAL RINSE 1.2 MG/ML
15 SOLUTION DENTAL 2 TIMES DAILY
Qty: 473 ML | Refills: 0 | Status: SHIPPED | OUTPATIENT
Start: 2023-11-16 | End: 2023-11-30

## 2023-11-16 RX ORDER — OSELTAMIVIR PHOSPHATE 75 MG/1
75 CAPSULE ORAL 2 TIMES DAILY
Qty: 10 CAPSULE | Refills: 0 | Status: SHIPPED | OUTPATIENT
Start: 2023-11-16 | End: 2023-11-21

## 2023-11-16 NOTE — DISCHARGE INSTRUCTIONS

## 2023-11-16 NOTE — ED PROVIDER NOTES
Encounter Date: 2023       History     Chief Complaint   Patient presents with    Influenza     Pt reports to ED with c/o body aches, sore throat, HA. Pt's children both tested positive for flu within the past couple days.      Terri Summers is a 38 y.o. female who  has a past medical history of ADHD (attention deficit hyperactivity disorder), Anxiety, History of psychiatric care, Hypertension, Psychiatric exam requested by authority, Psychiatric problem, and Therapy.    The patient presents to the ED due to headache body aches cough and sore throat.  Patient has sick contacts at home who have tested positive for the flu her symptoms have been ongoing for 3 days.  She denies neck pain trouble breathing abdominal pain or nausea/vomiting.  No urinary complaints.    The history is provided by the patient.     Review of patient's allergies indicates:   Allergen Reactions    Hydrochlorothiazide      Past Medical History:   Diagnosis Date    ADHD (attention deficit hyperactivity disorder)     Anxiety     History of psychiatric care     Hypertension     Psychiatric exam requested by authority     Psychiatric problem     Therapy      Past Surgical History:   Procedure Laterality Date     SECTION      DILATION AND CURETTAGE OF UTERUS      missed ab    KNEE ARTHROSCOPY W/ ACL RECONSTRUCTION Right 12/15/2022    Procedure: 1. RECONSTRUCTION, KNEE, ACL, ARTHROSCOPIC WITH QUADRICEPS AUTOGRAFT 2. MQTFL RECONSTRUCTION WITH SEMITENDINOSIS ALLOGRAFT;  Surgeon: Shan Crystal MD;  Location: [facility] OR;  Service: Orthopedics;  Laterality: Right;  [facility] set/meniscal implants, tendon strippers, double blade knife, interference screws, PopLock anchors  Caden thomas notified   Elizabeth notified 10mm double k    REPAIR OF LIGAMENT Right     thumb     Family History   Problem Relation Age of Onset    ADD / ADHD Father     Ovarian cancer Mother     Ovarian cancer Maternal Grandmother     Breast cancer Neg Hx      Colon cancer Neg Hx      Social History     Tobacco Use    Smoking status: Former     Types: Cigarettes    Smokeless tobacco: Never   Substance Use Topics    Alcohol use: Yes     Comment: rare drink occasionally    Drug use: No     Comment: has abused THC, LSD, 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     Review of Systems   Constitutional:  Negative for chills.   HENT:  Positive for sore throat.    Respiratory:  Negative for shortness of breath.    Cardiovascular:  Negative for chest pain.   Gastrointestinal:  Negative for nausea and vomiting.   Genitourinary:  Negative for dysuria.   Musculoskeletal:  Positive for myalgias. Negative for back pain and neck pain.   Skin:  Negative for rash and wound.   Neurological:  Positive for headaches. Negative for syncope and weakness.       Physical Exam     Initial Vitals [11/16/23 0021]   BP Pulse Resp Temp SpO2   (!) 145/93 102 20 98.2 °F (36.8 °C) 99 %      MAP       --         Physical Exam    Constitutional:  Non-toxic appearance. No distress.   HENT:   Head: Normocephalic and atraumatic.   Mouth/Throat: Oropharynx is clear and moist. No oropharyngeal exudate.   Neck: No tracheal deviation present.   Normal range of motion.  Cardiovascular:  Regular rhythm, S1 normal and S2 normal.           Pulmonary/Chest: Breath sounds normal. No stridor. No respiratory distress.   Abdominal: Abdomen is soft. She exhibits no distension. There is no abdominal tenderness.   Musculoskeletal:      Cervical back: Normal range of motion.     Lymphadenopathy:     She has no cervical adenopathy.   Neurological: She is alert.   Skin: Skin is warm. No rash noted.   Psychiatric: She has a normal mood and affect.         ED Course   Procedures  Labs Reviewed   POCT INFLUENZA A/B MOLECULAR - Abnormal; Notable for the following components:       Result Value    POC Molecular Influenza B Ag Positive (*)     All other components within normal limits   POCT URINE PREGNANCY          Imaging Results    None           Medications - No data to display  Medical Decision Making  Differential Diagnosis includes, but is not limited to:  Sepsis, meningitis, cavernous sinus thrombosis, nasal foreign body, otitis media/external, nasal polyp, bacterial sinusitis, allergic rhinitis, influenza, bacterial/viral pharyngitis, peritonsillar abscess, retropharyngeal abscess, bacterial/viral pneumonia.      Amount and/or Complexity of Data Reviewed  Labs: ordered. Decision-making details documented in ED Course.    Risk  Prescription drug management.  Risk Details: Patient is found to be flu positive.  Requesting Tamiflu after discussion of risks and benefits.  Will plan to discharge with symptomatic treatment.  No emergent illness identified today the patient appears well and stable for discharge.  No signs to suggest meningitis or pneumonia.  Discussed close follow-up with PCP return precautions for return of symptoms or any new symptoms of concern.    After taking into careful account the historical factors and physical exam findings of the patient's presentation today, in conjunction with the empirical and objective data obtained on ED workup, no acute emergent medical condition has been identified. The patient appears to be low risk for an emergent medical condition and I feel it is safe and appropriate at this time for the patient to be discharged to follow-up as detailed in their discharge instructions for reevaluation and possible continued outpatient workup and management. I have discussed the specifics of the workup with the patient and the patient has verbalized understanding of the details of the workup, the diagnosis, the treatment plan, and the need for outpatient follow-up.  Although the patient has no emergent etiology today this does not preclude the development of an emergent condition so in addition, I have advised the patient that they can return to the ED and/or activate EMS at any time with worsening of their symptoms,  change of their symptoms, or with any other medical complaint.  The patient remained comfortable and stable during their visit in the ED.  Discharge and follow-up instructions discussed with the patient who expressed understanding and willingness to comply with my recommendations.                 ED Course as of 11/17/23 0332   Thu Nov 16, 2023   0043 POCT Influenza A/B Molecular(!) [RN]      ED Course User Index  [RN] Dario Gipson Jr., MD                    Clinical Impression:   Final diagnoses:  [J11.1] Influenza (Primary)        ED Disposition Condition    Discharge Stable          ED Prescriptions       Medication Sig Dispense Start Date End Date Auth. Provider    chlorhexidine (PERIDEX) 0.12 % solution Use as directed 15 mLs in the mouth or throat 2 (two) times daily. Swish and spit twice daily for sore throat as needed for 14 days 473 mL 11/16/2023 11/30/2023 Dario Gipson Jr., MD    benzonatate (TESSALON) 100 MG capsule Take 1 capsule (100 mg total) by mouth 3 (three) times daily as needed for Cough. 21 capsule 11/16/2023 11/23/2023 Dario Gipson Jr., MD    oseltamivir (TAMIFLU) 75 MG capsule Take 1 capsule (75 mg total) by mouth 2 (two) times daily. for 5 days 10 capsule 11/16/2023 11/21/2023 Dario Gipson Jr., MD          Follow-up Information    None         Portions of this note were dictated using voice recognition software and may contain dictation related errors in spelling/grammar/syntax not found on text review       Dario Gipson Jr., MD  11/17/23 0332

## 2023-11-16 NOTE — ED NOTES
Patient presents with flu like symptoms. States both of her children recently got diagnosed with flu B. POCT test indicates patient is flu B positive. Patient informed. Patient in no acute distress, resting comfortably.

## 2023-11-20 DIAGNOSIS — F90.2 ADHD (ATTENTION DEFICIT HYPERACTIVITY DISORDER), COMBINED TYPE: ICD-10-CM

## 2023-11-20 RX ORDER — DEXTROAMPHETAMINE SACCHARATE, AMPHETAMINE ASPARTATE, DEXTROAMPHETAMINE SULFATE AND AMPHETAMINE SULFATE 7.5; 7.5; 7.5; 7.5 MG/1; MG/1; MG/1; MG/1
30 TABLET ORAL 2 TIMES DAILY
Qty: 60 TABLET | Refills: 0 | Status: SHIPPED | OUTPATIENT
Start: 2023-11-20 | End: 2023-11-27 | Stop reason: SDUPTHER

## 2023-11-27 ENCOUNTER — OFFICE VISIT (OUTPATIENT)
Dept: PSYCHIATRY | Facility: CLINIC | Age: 38
End: 2023-11-27
Payer: MEDICAID

## 2023-11-27 DIAGNOSIS — F90.2 ADHD (ATTENTION DEFICIT HYPERACTIVITY DISORDER), COMBINED TYPE: ICD-10-CM

## 2023-11-27 DIAGNOSIS — F41.0 GENERALIZED ANXIETY DISORDER WITH PANIC ATTACKS: ICD-10-CM

## 2023-11-27 DIAGNOSIS — F41.1 GENERALIZED ANXIETY DISORDER WITH PANIC ATTACKS: ICD-10-CM

## 2023-11-27 PROCEDURE — 99213 PR OFFICE/OUTPT VISIT, EST, LEVL III, 20-29 MIN: ICD-10-PCS | Mod: SA,HB,95, | Performed by: NURSE PRACTITIONER

## 2023-11-27 PROCEDURE — 99213 OFFICE O/P EST LOW 20 MIN: CPT | Mod: SA,HB,95, | Performed by: NURSE PRACTITIONER

## 2023-11-27 RX ORDER — DEXTROAMPHETAMINE SACCHARATE, AMPHETAMINE ASPARTATE, DEXTROAMPHETAMINE SULFATE AND AMPHETAMINE SULFATE 7.5; 7.5; 7.5; 7.5 MG/1; MG/1; MG/1; MG/1
30 TABLET ORAL 2 TIMES DAILY
Qty: 60 TABLET | Refills: 0 | Status: SHIPPED | OUTPATIENT
Start: 2023-12-26 | End: 2024-04-01

## 2023-11-27 RX ORDER — DEXTROAMPHETAMINE SACCHARATE, AMPHETAMINE ASPARTATE, DEXTROAMPHETAMINE SULFATE AND AMPHETAMINE SULFATE 7.5; 7.5; 7.5; 7.5 MG/1; MG/1; MG/1; MG/1
30 TABLET ORAL 2 TIMES DAILY
Qty: 60 TABLET | Refills: 0 | Status: SHIPPED | OUTPATIENT
Start: 2024-01-25 | End: 2024-04-01

## 2023-11-27 RX ORDER — DEXTROAMPHETAMINE SACCHARATE, AMPHETAMINE ASPARTATE, DEXTROAMPHETAMINE SULFATE AND AMPHETAMINE SULFATE 5; 5; 5; 5 MG/1; MG/1; MG/1; MG/1
1 TABLET ORAL 3 TIMES DAILY
Qty: 90 TABLET | Refills: 0 | Status: SHIPPED | OUTPATIENT
Start: 2023-11-27 | End: 2023-12-27 | Stop reason: SDUPTHER

## 2023-11-27 RX ORDER — ALPRAZOLAM 0.5 MG/1
0.5 TABLET ORAL DAILY PRN
Qty: 30 TABLET | Refills: 5 | Status: SHIPPED | OUTPATIENT
Start: 2023-11-27 | End: 2024-04-01 | Stop reason: SDUPTHER

## 2023-11-27 NOTE — PROGRESS NOTES
Outpatient Psychiatry Follow-Up Visit (MD/NP)    11/27/2023    Clinical Status of Patient:  Outpatient (Ambulatory)    Chief Complaint:  Terri Summers is a 38 y.o. female who presents today for follow-up of attention problems.  Met with patient.      Last visit was:2/20/23  The patient location is: home  The chief complaint leading to consultation is: attention problems    Visit type: audiovisual    Face to Face time with patient: 30  30 minutes of total time spent on the encounter, which includes face to face time and non-face to face time preparing to see the patient (eg, review of tests), Obtaining and/or reviewing separately obtained history, Documenting clinical information in the electronic or other health record, Independently interpreting results (not separately reported) and communicating results to the patient/family/caregiver, or Care coordination (not separately reported).     Each patient to whom he or she provides medical services by telemedicine is:  (1) informed of the relationship between the physician and patient and the respective role of any other health care provider with respect to management of the patient; and (2) notified that he or she may decline to receive medical services by telemedicine and may withdraw from such care at any time.    Interval History and Content of Current Session:  Current Psychiatric Medications/changes  Continue Adderall 30 mg po BID  Continue Xanax 0.5 mg po daily PRN panic attacks    Virtual Visit: Pt presents bright affect and euthymic mood. Reports improvement in anxiety and ADH symptoms with current medications. Reports that she is doing well. Denies SI/HI/AVH. Will continue meds    Psychotherapy:  Target symptoms: distractability, lack of focus  Why chosen therapy is appropriate versus another modality: relevant to diagnosis  Outcome monitoring methods: self-report  Therapeutic intervention type: insight oriented psychotherapy  Topics discussed/themes: building  skills sets for symptom management, symptom recognition  The patient's response to the intervention is accepting. The patient's progress toward treatment goals is good.   Duration of intervention: 14 minutes.    Review of Systems   PSYCHIATRIC: Pertinant items are noted in the narrative.  CONSTITUTIONAL: No weight gain or loss.   MUSCULOSKELETAL: No pain or stiffness of the joints.  NEUROLOGIC: No weakness, sensory changes, seizures, confusion, memory loss, tremor or other abnormal movements.  ENDOCRINE: No polydipsia or polyuria.  INTEGUMENTARY: No rashes or lacerations.  EYES: No exophthalmos, jaundice or blindness.  ENT: No dizziness, tinnitus or hearing loss.  RESPIRATORY: No shortness of breath.  CARDIOVASCULAR: No tachycardia or chest pain.  GASTROINTESTINAL: No nausea, vomiting, pain, constipation or diarrhea.  GENITOURINARY: No frequency, dysuria or sexual dysfunction.  HEMATOLOGIC/LYMPHATIC: No excessive bleeding, prolonged or excessive bleeding after dental extraction/injury.  ALLERGIC/IMMUNOLOGIC: No allergic response to materials, foods or animals at this time.    Past Medical, Family and Social History: The patient's past medical, family and social history have been reviewed and updated as appropriate within the electronic medical record - see encounter notes.    Compliance: no    Side effects: None    Risk Parameters:  Patient reports no suicidal ideation  Patient reports no homicidal ideation  Patient reports no self-injurious behavior  Patient reports no violent behavior    Exam (detailed: at least 9 elements; comprehensive: all 15 elements)   Constitutional  Vitals:  Most recent vital signs, dated greater than 90 days prior to this appointment, were not reviewed.   There were no vitals filed for this visit.     General:  unremarkable, age appropriate     Musculoskeletal  Muscle Strength/Tone:  no tremor   Gait & Station:  non-ataxic     Psychiatric  Speech:  no latency; no press   Mood & Affect:   steady  congruent and appropriate   Thought Process:  normal and logical   Associations:  intact   Thought Content:  normal, no suicidality, no homicidality, delusions, or paranoia   Insight:  intact   Judgement: behavior is adequate to circumstances   Orientation:  grossly intact   Memory: intact for content of interview   Language: grossly intact   Attention Span & Concentration:  able to focus   Fund of Knowledge:  intact and appropriate to age and level of education     Assessment and Diagnosis   Status/Progress: Based on the examination today, the patient's problem(s) is/are improved.  New problems have not been presented today.   Co-morbidities and Lack of compliance are not complicating management of the primary condition.  There are no active rule-out diagnoses for this patient at this time.     General Impression:       ICD-10-CM ICD-9-CM   1. Generalized anxiety disorder with panic attacks  F41.1 300.02    F41.0 300.01   2. ADHD (attention deficit hyperactivity disorder), combined type  F90.2 314.01     Intervention/Counseling/Treatment Plan   Medication Management: The risks and benefits of medication were discussed with the patient.  Continue Adderall 30 mg po BID  Continue Xanax 0.5 mg po daily PRN panic attacks    Return to Clinic: 3 months    Risks, benefits, side effects and alternative treatments discussed with patient. Patient agrees with the current plan as documented.  Encouraged Patient to keep future appointments.  Take medications as prescribed and abstain from substance abuse.  Pt to present to ED for thoughts to harm herself or others

## 2023-12-27 DIAGNOSIS — F90.2 ADHD (ATTENTION DEFICIT HYPERACTIVITY DISORDER), COMBINED TYPE: ICD-10-CM

## 2023-12-28 RX ORDER — DEXTROAMPHETAMINE SACCHARATE, AMPHETAMINE ASPARTATE, DEXTROAMPHETAMINE SULFATE AND AMPHETAMINE SULFATE 5; 5; 5; 5 MG/1; MG/1; MG/1; MG/1
1 TABLET ORAL 3 TIMES DAILY
Qty: 90 TABLET | Refills: 0 | Status: SHIPPED | OUTPATIENT
Start: 2023-12-28 | End: 2024-01-31 | Stop reason: SDUPTHER

## 2024-01-31 ENCOUNTER — PATIENT MESSAGE (OUTPATIENT)
Dept: PSYCHIATRY | Facility: CLINIC | Age: 39
End: 2024-01-31
Payer: MEDICAID

## 2024-01-31 DIAGNOSIS — F90.2 ADHD (ATTENTION DEFICIT HYPERACTIVITY DISORDER), COMBINED TYPE: ICD-10-CM

## 2024-01-31 RX ORDER — DEXTROAMPHETAMINE SACCHARATE, AMPHETAMINE ASPARTATE, DEXTROAMPHETAMINE SULFATE AND AMPHETAMINE SULFATE 5; 5; 5; 5 MG/1; MG/1; MG/1; MG/1
1 TABLET ORAL 3 TIMES DAILY
Qty: 90 TABLET | Refills: 0 | Status: SHIPPED | OUTPATIENT
Start: 2024-01-31 | End: 2024-02-29 | Stop reason: SDUPTHER

## 2024-02-29 ENCOUNTER — PATIENT MESSAGE (OUTPATIENT)
Dept: PSYCHIATRY | Facility: CLINIC | Age: 39
End: 2024-02-29
Payer: MEDICAID

## 2024-02-29 DIAGNOSIS — F90.2 ADHD (ATTENTION DEFICIT HYPERACTIVITY DISORDER), COMBINED TYPE: ICD-10-CM

## 2024-02-29 RX ORDER — DEXTROAMPHETAMINE SACCHARATE, AMPHETAMINE ASPARTATE, DEXTROAMPHETAMINE SULFATE AND AMPHETAMINE SULFATE 5; 5; 5; 5 MG/1; MG/1; MG/1; MG/1
1 TABLET ORAL 3 TIMES DAILY
Qty: 90 TABLET | Refills: 0 | Status: SHIPPED | OUTPATIENT
Start: 2024-02-29 | End: 2024-03-26 | Stop reason: SDUPTHER

## 2024-02-29 RX ORDER — DEXTROAMPHETAMINE SACCHARATE, AMPHETAMINE ASPARTATE, DEXTROAMPHETAMINE SULFATE AND AMPHETAMINE SULFATE 5; 5; 5; 5 MG/1; MG/1; MG/1; MG/1
1 TABLET ORAL 3 TIMES DAILY
Qty: 90 TABLET | Refills: 0 | OUTPATIENT
Start: 2024-02-29

## 2024-03-26 ENCOUNTER — PATIENT MESSAGE (OUTPATIENT)
Dept: PSYCHIATRY | Facility: CLINIC | Age: 39
End: 2024-03-26
Payer: MEDICAID

## 2024-03-26 DIAGNOSIS — F90.2 ADHD (ATTENTION DEFICIT HYPERACTIVITY DISORDER), COMBINED TYPE: ICD-10-CM

## 2024-03-26 RX ORDER — DEXTROAMPHETAMINE SACCHARATE, AMPHETAMINE ASPARTATE, DEXTROAMPHETAMINE SULFATE AND AMPHETAMINE SULFATE 5; 5; 5; 5 MG/1; MG/1; MG/1; MG/1
1 TABLET ORAL 3 TIMES DAILY
Qty: 90 TABLET | Refills: 0 | Status: SHIPPED | OUTPATIENT
Start: 2024-03-26 | End: 2024-04-01 | Stop reason: SDUPTHER

## 2024-04-01 ENCOUNTER — OFFICE VISIT (OUTPATIENT)
Dept: PSYCHIATRY | Facility: CLINIC | Age: 39
End: 2024-04-01
Payer: MEDICAID

## 2024-04-01 DIAGNOSIS — F90.2 ADHD (ATTENTION DEFICIT HYPERACTIVITY DISORDER), COMBINED TYPE: ICD-10-CM

## 2024-04-01 DIAGNOSIS — F41.0 GENERALIZED ANXIETY DISORDER WITH PANIC ATTACKS: ICD-10-CM

## 2024-04-01 DIAGNOSIS — F41.1 GENERALIZED ANXIETY DISORDER WITH PANIC ATTACKS: ICD-10-CM

## 2024-04-01 PROCEDURE — 99213 OFFICE O/P EST LOW 20 MIN: CPT | Mod: SA,HB,95, | Performed by: NURSE PRACTITIONER

## 2024-04-01 RX ORDER — ALPRAZOLAM 0.5 MG/1
0.5 TABLET ORAL DAILY PRN
Qty: 30 TABLET | Refills: 5 | Status: SHIPPED | OUTPATIENT
Start: 2024-04-01

## 2024-04-01 RX ORDER — DEXTROAMPHETAMINE SACCHARATE, AMPHETAMINE ASPARTATE, DEXTROAMPHETAMINE SULFATE AND AMPHETAMINE SULFATE 5; 5; 5; 5 MG/1; MG/1; MG/1; MG/1
1 TABLET ORAL 3 TIMES DAILY
Qty: 90 TABLET | Refills: 0 | Status: SHIPPED | OUTPATIENT
Start: 2024-06-01

## 2024-04-01 RX ORDER — DEXTROAMPHETAMINE SACCHARATE, AMPHETAMINE ASPARTATE, DEXTROAMPHETAMINE SULFATE AND AMPHETAMINE SULFATE 5; 5; 5; 5 MG/1; MG/1; MG/1; MG/1
1 TABLET ORAL 3 TIMES DAILY
Qty: 90 TABLET | Refills: 0 | Status: SHIPPED | OUTPATIENT
Start: 2024-04-01

## 2024-04-01 RX ORDER — DEXTROAMPHETAMINE SACCHARATE, AMPHETAMINE ASPARTATE, DEXTROAMPHETAMINE SULFATE AND AMPHETAMINE SULFATE 5; 5; 5; 5 MG/1; MG/1; MG/1; MG/1
1 TABLET ORAL 3 TIMES DAILY
Qty: 90 TABLET | Refills: 0 | Status: SHIPPED | OUTPATIENT
Start: 2024-05-01

## 2024-04-01 NOTE — PROGRESS NOTES
Outpatient Psychiatry Follow-Up Visit (MD/NP)    4/1/2024    Clinical Status of Patient:  Outpatient (Ambulatory)    Chief Complaint:  Terri Summers is a 38 y.o. female who presents today for follow-up of attention problems.  Met with patient.      Last visit was:2/20/23  The patient location is: home  The chief complaint leading to consultation is: attention problems    Visit type: audiovisual    Face to Face time with patient: 30 minutes  30 minutes of total time spent on the encounter, which includes face to face time and non-face to face time preparing to see the patient (eg, review of tests), Obtaining and/or reviewing separately obtained history, Documenting clinical information in the electronic or other health record, Independently interpreting results (not separately reported) and communicating results to the patient/family/caregiver, or Care coordination (not separately reported).     Each patient to whom he or she provides medical services by telemedicine is:  (1) informed of the relationship between the physician and patient and the respective role of any other health care provider with respect to management of the patient; and (2) notified that he or she may decline to receive medical services by telemedicine and may withdraw from such care at any time.    Interval History and Content of Current Session:  Current Psychiatric Medications/changes  Continue Adderall 30 mg po BID  Continue Xanax 0.5 mg po daily PRN panic attacks    Virtual Visit: Pt presents bright affect and euthymic mood. Reports improvement in anxiety and ADH symptoms with current medications. Reports that she is doing well. Denies SI/HI/AVH. Will continue meds    Psychotherapy:  Target symptoms: distractability, lack of focus  Why chosen therapy is appropriate versus another modality: relevant to diagnosis  Outcome monitoring methods: self-report  Therapeutic intervention type: insight oriented psychotherapy  Topics discussed/themes:  building skills sets for symptom management, symptom recognition  The patient's response to the intervention is accepting. The patient's progress toward treatment goals is good.   Duration of intervention: 14 minutes.    Review of Systems   PSYCHIATRIC: Pertinant items are noted in the narrative.  CONSTITUTIONAL: No weight gain or loss.   MUSCULOSKELETAL: No pain or stiffness of the joints.  NEUROLOGIC: No weakness, sensory changes, seizures, confusion, memory loss, tremor or other abnormal movements.  ENDOCRINE: No polydipsia or polyuria.  INTEGUMENTARY: No rashes or lacerations.  EYES: No exophthalmos, jaundice or blindness.  ENT: No dizziness, tinnitus or hearing loss.  RESPIRATORY: No shortness of breath.  CARDIOVASCULAR: No tachycardia or chest pain.  GASTROINTESTINAL: No nausea, vomiting, pain, constipation or diarrhea.  GENITOURINARY: No frequency, dysuria or sexual dysfunction.  HEMATOLOGIC/LYMPHATIC: No excessive bleeding, prolonged or excessive bleeding after dental extraction/injury.  ALLERGIC/IMMUNOLOGIC: No allergic response to materials, foods or animals at this time.    Past Medical, Family and Social History: The patient's past medical, family and social history have been reviewed and updated as appropriate within the electronic medical record - see encounter notes.    Compliance: no    Side effects: None    Risk Parameters:  Patient reports no suicidal ideation  Patient reports no homicidal ideation  Patient reports no self-injurious behavior  Patient reports no violent behavior    Exam (detailed: at least 9 elements; comprehensive: all 15 elements)   Constitutional  Vitals:  Most recent vital signs, dated greater than 90 days prior to this appointment, were not reviewed.   There were no vitals filed for this visit.     General:  unremarkable, age appropriate     Musculoskeletal  Muscle Strength/Tone:  no tremor   Gait & Station:  non-ataxic     Psychiatric  Speech:  no latency; no press   Mood &  Affect:  steady  congruent and appropriate   Thought Process:  normal and logical   Associations:  intact   Thought Content:  normal, no suicidality, no homicidality, delusions, or paranoia   Insight:  intact   Judgement: behavior is adequate to circumstances   Orientation:  grossly intact   Memory: intact for content of interview   Language: grossly intact   Attention Span & Concentration:  able to focus   Fund of Knowledge:  intact and appropriate to age and level of education     Assessment and Diagnosis   Status/Progress: Based on the examination today, the patient's problem(s) is/are improved.  New problems have not been presented today.   Co-morbidities and Lack of compliance are not complicating management of the primary condition.  There are no active rule-out diagnoses for this patient at this time.     General Impression:       ICD-10-CM ICD-9-CM   1. ADHD (attention deficit hyperactivity disorder), combined type  F90.2 314.01   2. Generalized anxiety disorder with panic attacks  F41.1 300.02    F41.0 300.01     Intervention/Counseling/Treatment Plan   Medication Management: The risks and benefits of medication were discussed with the patient.  Continue Adderall 30 mg po BID  Continue Xanax 0.5 mg po daily PRN panic attacks    Return to Clinic: 3 months    Risks, benefits, side effects and alternative treatments discussed with patient. Patient agrees with the current plan as documented.  Encouraged Patient to keep future appointments.  Take medications as prescribed and abstain from substance abuse.  Pt to present to ED for thoughts to harm herself or others

## 2024-05-23 ENCOUNTER — PATIENT MESSAGE (OUTPATIENT)
Dept: ORTHOPEDICS | Facility: CLINIC | Age: 39
End: 2024-05-23
Payer: MEDICAID

## 2024-05-23 DIAGNOSIS — M25.561 RIGHT KNEE PAIN, UNSPECIFIED CHRONICITY: Primary | ICD-10-CM

## 2024-05-28 ENCOUNTER — PATIENT MESSAGE (OUTPATIENT)
Dept: ORTHOPEDICS | Facility: CLINIC | Age: 39
End: 2024-05-28

## 2024-05-28 ENCOUNTER — HOSPITAL ENCOUNTER (OUTPATIENT)
Dept: RADIOLOGY | Facility: HOSPITAL | Age: 39
Discharge: HOME | End: 2024-05-28
Attending: ORTHOPAEDIC SURGERY
Payer: MEDICAID

## 2024-05-28 ENCOUNTER — OFFICE VISIT (OUTPATIENT)
Dept: ORTHOPEDICS | Facility: CLINIC | Age: 39
End: 2024-05-28
Payer: MEDICAID

## 2024-05-28 VITALS
HEIGHT: 68 IN | DIASTOLIC BLOOD PRESSURE: 107 MMHG | HEART RATE: 83 BPM | WEIGHT: 216 LBS | BODY MASS INDEX: 32.74 KG/M2 | SYSTOLIC BLOOD PRESSURE: 159 MMHG

## 2024-05-28 DIAGNOSIS — S83.004D: ICD-10-CM

## 2024-05-28 DIAGNOSIS — Z98.890 S/P LATERAL MENISCUS REPAIR OF RIGHT KNEE: ICD-10-CM

## 2024-05-28 DIAGNOSIS — Z98.890 S/P ACL RECONSTRUCTION: Primary | ICD-10-CM

## 2024-05-28 DIAGNOSIS — M25.561 RIGHT KNEE PAIN, UNSPECIFIED CHRONICITY: ICD-10-CM

## 2024-05-28 PROCEDURE — 1159F MED LIST DOCD IN RCRD: CPT | Mod: CPTII,,, | Performed by: ORTHOPAEDIC SURGERY

## 2024-05-28 PROCEDURE — 99214 OFFICE O/P EST MOD 30 MIN: CPT | Mod: S$PBB,,, | Performed by: ORTHOPAEDIC SURGERY

## 2024-05-28 PROCEDURE — 3080F DIAST BP >= 90 MM HG: CPT | Mod: CPTII,,, | Performed by: ORTHOPAEDIC SURGERY

## 2024-05-28 PROCEDURE — 1160F RVW MEDS BY RX/DR IN RCRD: CPT | Mod: CPTII,,, | Performed by: ORTHOPAEDIC SURGERY

## 2024-05-28 PROCEDURE — 73564 X-RAY EXAM KNEE 4 OR MORE: CPT | Mod: TC,PN,RT

## 2024-05-28 PROCEDURE — 3077F SYST BP >= 140 MM HG: CPT | Mod: CPTII,,, | Performed by: ORTHOPAEDIC SURGERY

## 2024-05-28 PROCEDURE — 73564 X-RAY EXAM KNEE 4 OR MORE: CPT | Mod: 26,RT,, | Performed by: RADIOLOGY

## 2024-05-28 PROCEDURE — 3008F BODY MASS INDEX DOCD: CPT | Mod: CPTII,,, | Performed by: ORTHOPAEDIC SURGERY

## 2024-05-28 PROCEDURE — 99999 PR PBB SHADOW E&M-EST. PATIENT-LVL III: CPT | Mod: PBBFAC,,, | Performed by: ORTHOPAEDIC SURGERY

## 2024-05-28 PROCEDURE — 99213 OFFICE O/P EST LOW 20 MIN: CPT | Mod: PBBFAC,25,PN | Performed by: ORTHOPAEDIC SURGERY

## 2024-05-28 NOTE — PROGRESS NOTES
Patient ID:   Terri Summers is a 38 y.o. female.    Chief Complaint:   Right knee pain and swelling    HPI:   The patient is 17 months and 13 days out from undergoing right ACL reconstruction with quadriceps autograft, MQTFL reconstruction with semitendinosis allograft, and lateral meniscus repair.  She did well in the postoperative period.  Recently, she noted increased pain and swelling in her knee.  She felt a pop in the posterior lateral aspect of her knee recently.  She reports having a significant amount of pain and swelling in the knee.  Her current pain level is 7/10.    Medications:    Current Outpatient Medications:     acetaminophen (TYLENOL) 325 MG tablet, Take 325 mg by mouth every 6 (six) hours as needed for Pain., Disp: , Rfl:     ALPRAZolam (XANAX) 0.5 MG tablet, Take 1 tablet (0.5 mg total) by mouth daily as needed for Anxiety., Disp: 30 tablet, Rfl: 5    dextroamphetamine-amphetamine (ADDERALL) 20 mg tablet, Take 1 tablet by mouth 3 (three) times daily., Disp: 90 tablet, Rfl: 0    dextroamphetamine-amphetamine (ADDERALL) 20 mg tablet, Take 1 tablet by mouth 3 (three) times daily., Disp: 90 tablet, Rfl: 0    [START ON 6/1/2024] dextroamphetamine-amphetamine (ADDERALL) 20 mg tablet, Take 1 tablet by mouth 3 (three) times daily., Disp: 90 tablet, Rfl: 0    dextroamphetamine-amphetamine 30 mg Tab, Take 1 tablet (30 mg total) by mouth 2 (two) times daily., Disp: 60 tablet, Rfl: 0    ergocalciferol (ERGOCALCIFEROL) 50,000 unit Cap, Take 50,000 Units by mouth every 7 days., Disp: , Rfl:     losartan (COZAAR) 50 MG tablet, Take 1 tablet by mouth once daily., Disp: , Rfl:     meloxicam (MOBIC) 7.5 MG tablet, Take 1 tablet (7.5 mg total) by mouth once daily., Disp: 28 tablet, Rfl: 0    traMADoL (ULTRAM) 50 mg tablet, Take 1 tablet (50 mg total) by mouth every 6 (six) hours as needed for Pain., Disp: 20 tablet, Rfl: 0    Allergies:  Review of patient's allergies indicates:   Allergen Reactions     "Hydrochlorothiazide        Past Medical History:  Past Medical History:   Diagnosis Date    ADHD (attention deficit hyperactivity disorder)     Anxiety     History of psychiatric care     Hypertension     Psychiatric exam requested by authority     Psychiatric problem     Therapy         Past Surgical History:  Past Surgical History:   Procedure Laterality Date     SECTION      DILATION AND CURETTAGE OF UTERUS      missed ab    KNEE ARTHROSCOPY W/ ACL RECONSTRUCTION Right 12/15/2022    Procedure: 1. RECONSTRUCTION, KNEE, ACL, ARTHROSCOPIC WITH QUADRICEPS AUTOGRAFT 2. MQTFL RECONSTRUCTION WITH SEMITENDINOSIS ALLOGRAFT;  Surgeon: Shan Crystal MD;  Location: [facility];  Service: Orthopedics;  Laterality: Right;  Linvate ACL set/meniscal implants, tendon strippers, double blade knife, interference screws, PopLock anchors  [employer] martha notified   Elizabeth notified 10mm double k    REPAIR OF LIGAMENT Right     thumb       Social History:  Social History     Occupational History    Not on file   Tobacco Use    Smoking status: Former     Types: Cigarettes    Smokeless tobacco: Never   Substance and Sexual Activity    Alcohol use: Yes     Comment: rare drink occasionally    Drug use: No     Comment: has abused THC, LSD, 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       Family History:  Family History   Problem Relation Name Age of Onset    ADD / ADHD Father      Ovarian cancer Mother      Ovarian cancer Maternal Grandmother      Breast cancer Neg Hx      Colon cancer Neg Hx          ROS:  Review of Systems   Musculoskeletal:  Positive for joint pain, joint swelling and myalgias.   Neurological:  Negative for numbness and paresthesias.   All other systems reviewed and are negative.      Vitals:  BP (!) 159/107   Pulse 83   Ht 5' 8" (1.727 m)   Wt 98 kg (216 lb)   BMI 32.84 kg/m²     Physical Examination:  Comprehensive Orthopaedic " Musculoskeletal Exam    General      Constitutional: appears stated age, mildly obese, well-developed and well-nourished    Scleral icterus: no    Labored breathing: no    Psychiatric: normal mood and affect and no acute distress    Neurological: alert and oriented x3    Skin: intact    Lymphadenopathy: none     Ortho Exam   Right knee exam:   No erythema.  There is a 2+ effusion.    The patella feels stable with 2 quadrants of lateral translation and a solid endpoint.  Lachman's is 0.  Range of motion 0-140.  There is tenderness on the lateral joint line.  Positive Edison's.  No mediolateral laxity.  Negative posterior drawer.    Imaging:  I have ordered and independently reviewed the following imaging studies performed at [facility] today    X-Ray Knee Complete 4 Or More Views Right  Narrative: EXAMINATION:  XR KNEE COMP 4 OR MORE VIEWS RIGHT    CLINICAL HISTORY:  Pain in right knee    TECHNIQUE:  Right knee, four views    COMPARISON:  Plain film from 03/29/2023    FINDINGS:  Postsurgical changes status post right ACL reconstruction.  Redemonstration of cortical irregularity of the medial patellar facet at the retinacular insertion similar to prior exam.  Small suprapatellar joint effusion.  No evidence of acute fracture or dislocation.  Joint spaces are maintained.  Impression: As above.    Electronically signed by: Idris Gomez MD  Date:    05/28/2024  Time:    08:36    Assessment:  1. S/P ACL reconstruction    2. S/P lateral meniscus repair of right knee    3. Traumatic dislocation of patella, right, subsequent encounter      Plan:  The patient has a large effusion on today's examination.  I told her I was concerned that she may have retorn her lateral meniscus.  I have recommended that she undergo MRI scan to further evaluate.  She will return after the MRI scan is completed.    Orders Placed This Encounter    MRI Knee Without Contrast Right     No follow-ups on file.

## 2024-06-11 ENCOUNTER — HOSPITAL ENCOUNTER (OUTPATIENT)
Dept: RADIOLOGY | Facility: HOSPITAL | Age: 39
Discharge: HOME | End: 2024-06-11
Attending: ORTHOPAEDIC SURGERY
Payer: MEDICAID

## 2024-06-11 DIAGNOSIS — Z98.890 S/P ACL RECONSTRUCTION: ICD-10-CM

## 2024-06-11 DIAGNOSIS — Z98.890 S/P LATERAL MENISCUS REPAIR OF RIGHT KNEE: ICD-10-CM

## 2024-06-11 DIAGNOSIS — S83.004D: ICD-10-CM

## 2024-06-11 PROCEDURE — 73721 MRI JNT OF LWR EXTRE W/O DYE: CPT | Mod: TC,RT

## 2024-06-11 PROCEDURE — 73721 MRI JNT OF LWR EXTRE W/O DYE: CPT | Mod: 26,RT,, | Performed by: INTERNAL MEDICINE

## 2024-06-21 ENCOUNTER — OFFICE VISIT (OUTPATIENT)
Dept: ORTHOPEDICS | Facility: CLINIC | Age: 39
End: 2024-06-21
Payer: MEDICAID

## 2024-06-21 VITALS — WEIGHT: 216.06 LBS | BODY MASS INDEX: 32.74 KG/M2 | HEIGHT: 68 IN

## 2024-06-21 DIAGNOSIS — M94.261 CHONDROMALACIA, KNEE, RIGHT: ICD-10-CM

## 2024-06-21 DIAGNOSIS — Z98.890 S/P LATERAL MENISCUS REPAIR OF RIGHT KNEE: Primary | ICD-10-CM

## 2024-06-21 PROCEDURE — 99213 OFFICE O/P EST LOW 20 MIN: CPT | Mod: PBBFAC,PN | Performed by: ORTHOPAEDIC SURGERY

## 2024-06-21 PROCEDURE — 99999PBSHW PR PBB SHADOW TECHNICAL ONLY FILED TO HB: Mod: PBBFAC,,,

## 2024-06-21 PROCEDURE — 99999 PR PBB SHADOW E&M-EST. PATIENT-LVL III: CPT | Mod: PBBFAC,,, | Performed by: ORTHOPAEDIC SURGERY

## 2024-06-21 PROCEDURE — 20610 DRAIN/INJ JOINT/BURSA W/O US: CPT | Mod: PBBFAC,PN | Performed by: ORTHOPAEDIC SURGERY

## 2024-06-21 RX ORDER — TRIAMCINOLONE ACETONIDE 40 MG/ML
40 INJECTION, SUSPENSION INTRA-ARTICULAR; INTRAMUSCULAR
Status: DISCONTINUED | OUTPATIENT
Start: 2024-06-21 | End: 2024-06-21 | Stop reason: HOSPADM

## 2024-06-21 RX ADMIN — TRIAMCINOLONE ACETONIDE 40 MG: 40 INJECTION, SUSPENSION INTRA-ARTICULAR; INTRAMUSCULAR at 09:06

## 2024-06-21 NOTE — PROCEDURES
Large Joint Aspiration/Injection: R knee    Date/Time: 6/21/2024 9:30 AM    Performed by: Shan Crystal MD  Authorized by: Shan Crystal MD    Consent Done?:  Yes (Verbal)  Indications:  Joint swelling  Site marked: the procedure site was marked    Timeout: prior to procedure the correct patient, procedure, and site was verified    Prep: patient was prepped and draped in usual sterile fashion      Local anesthesia used?: Yes    Local anesthetic:  Topical anesthetic and lidocaine 1% without epinephrine  Anesthetic total (ml):  8      Details:  Needle Size:  22 G  Ultrasonic Guidance for needle placement?: No    Approach:  Lateral  Location:  Knee  Site:  R knee  Medications:  40 mg triamcinolone acetonide 40 mg/mL  Aspirate amount (mL):  25  Aspirate:  Clear  Patient tolerance:  Patient tolerated the procedure well with no immediate complications     A mixture containing 4 cc of 1% lidocaine and 1 cc of Kenalog (40mg/cc) was injected.

## 2024-06-21 NOTE — PROGRESS NOTES
Patient ID:   Terri Summers is a 38 y.o. female.    Chief Complaint:   Follow-up evaluation for right knee pain    HPI:   The patient is returning today for evaluation of her right knee.  She recently completed MRI scan of her left knee.  She has a history of undergoing right ACL reconstructive surgery in December of 2022.  In addition to the ACL reconstruction, she underwent MQTFL reconstruction and lateral meniscus repair.  At the time of surgery she was noted to have grade 3 chondromalacia in the lateral compartment.  She recently started to develop increasing swelling in the right knee.    Medications:    Current Outpatient Medications:     acetaminophen (TYLENOL) 325 MG tablet, Take 325 mg by mouth every 6 (six) hours as needed for Pain., Disp: , Rfl:     ALPRAZolam (XANAX) 0.5 MG tablet, Take 1 tablet (0.5 mg total) by mouth daily as needed for Anxiety., Disp: 30 tablet, Rfl: 5    dextroamphetamine-amphetamine (ADDERALL) 20 mg tablet, Take 1 tablet by mouth 3 (three) times daily., Disp: 90 tablet, Rfl: 0    dextroamphetamine-amphetamine (ADDERALL) 20 mg tablet, Take 1 tablet by mouth 3 (three) times daily., Disp: 90 tablet, Rfl: 0    dextroamphetamine-amphetamine (ADDERALL) 20 mg tablet, Take 1 tablet by mouth 3 (three) times daily., Disp: 90 tablet, Rfl: 0    dextroamphetamine-amphetamine 30 mg Tab, Take 1 tablet (30 mg total) by mouth 2 (two) times daily., Disp: 60 tablet, Rfl: 0    ergocalciferol (ERGOCALCIFEROL) 50,000 unit Cap, Take 50,000 Units by mouth every 7 days., Disp: , Rfl:     losartan (COZAAR) 50 MG tablet, Take 1 tablet by mouth once daily., Disp: , Rfl:     meloxicam (MOBIC) 7.5 MG tablet, Take 1 tablet (7.5 mg total) by mouth once daily., Disp: 28 tablet, Rfl: 0    traMADoL (ULTRAM) 50 mg tablet, Take 1 tablet (50 mg total) by mouth every 6 (six) hours as needed for Pain., Disp: 20 tablet, Rfl: 0    Allergies:  Review of patient's allergies indicates:   Allergen Reactions     "Hydrochlorothiazide        Past Medical History:  Past Medical History:   Diagnosis Date    ADHD (attention deficit hyperactivity disorder)     Anxiety     History of psychiatric care     Hypertension     Psychiatric exam requested by authority     Psychiatric problem     Therapy         Past Surgical History:  Past Surgical History:   Procedure Laterality Date     SECTION      DILATION AND CURETTAGE OF UTERUS      missed ab    KNEE ARTHROSCOPY W/ ACL RECONSTRUCTION Right 12/15/2022    Procedure: 1. RECONSTRUCTION, KNEE, ACL, ARTHROSCOPIC WITH QUADRICEPS AUTOGRAFT 2. MQTFL RECONSTRUCTION WITH SEMITENDINOSIS ALLOGRAFT;  Surgeon: Shan Crystal MD;  Location: [facility];  Service: Orthopedics;  Laterality: Right;  Linvate ACL set/meniscal implants, tendon strippers, double blade knife, interference screws, PopLock anchors  [employer] martha notified   Elizabeth notified 10mm double k    REPAIR OF LIGAMENT Right     thumb       Social History:  Social History     Occupational History    Not on file   Tobacco Use    Smoking status: Former     Types: Cigarettes    Smokeless tobacco: Never   Substance and Sexual Activity    Alcohol use: Yes     Comment: rare drink occasionally    Drug use: No     Comment: has abused THC, LSD, 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       Family History:  Family History   Problem Relation Name Age of Onset    ADD / ADHD Father      Ovarian cancer Mother      Ovarian cancer Maternal Grandmother      Breast cancer Neg Hx      Colon cancer Neg Hx          ROS:  Review of Systems   Musculoskeletal:  Positive for joint pain and joint swelling.   All other systems reviewed and are negative.      Vitals:  Ht 5' 8" (1.727 m)   Wt 98 kg (216 lb 0.8 oz)   BMI 32.85 kg/m²     Physical Examination:  Comprehensive Orthopaedic Musculoskeletal Exam    General      Constitutional: appears stated age, mildly obese, well-developed " and well-nourished    Scleral icterus: no    Labored breathing: no    Psychiatric: normal mood and affect and no acute distress    Neurological: alert and oriented x3    Skin: intact    Lymphadenopathy: none     Ortho Exam   Right knee exam:   2+ effusion.    Range of motion 0-130.    Lachman's 0.  Negative pivot.  Tenderness to palpation of the lateral joint line.  Some pain with Edison's.    Imaging:  I have independently reviewed the following imaging studies performed at [facility]:    MRI Knee Without Contrast Right  Narrative: EXAMINATION:  MRI KNEE WITHOUT CONTRAST RIGHT    CLINICAL HISTORY:  Knee trauma, internal derangement suspected, xray done;Hx of lateral meniscus repair;Other specified postprocedural states    TECHNIQUE:  Multiplanar, multisequence images were performed about the right knee.  No contrast was administered.    COMPARISON:  Radiographs 05/28/2024; MRI knee 04/22/2023    FINDINGS:  Medial compartment: No meniscal tear.  Small region of high-grade partial to full-thickness cartilage loss over the weight-bearing femoral condyle (images 19-20 of series 6), new from prior.  No subchondral bone marrow edema.    Lateral compartment: Stable postoperative changes in the posterior horn from lateral meniscus repair.  No new meniscal tear.  Small region of high-grade partial to full-thickness cartilage loss over the posterior femoral condyle (09:26), new from prior.  There is mild subchondral bone marrow edema.    Patellofemoral compartment: Small region of partial-thickness cartilage loss over the medial patellar facet.  Questionable partial-thickness cartilage fissure over the median ridge.  Trochlear cartilage is maintained.  No subchondral bone marrow edema.    Tendons: Postoperative changes in the distal quadriceps tendon from graft harvest.  Patellar, popliteus, and biceps femoris tendons are intact.    Ligaments: Postoperative changes from ACL reconstruction.  Compared to 04/22/2023, there is  increased signal in the mid and distal portions of the ACL graft, with partial discontinuity and inferior bowing at the roof of the intercondylar notch (09:20).  No evidence of arthrofibrosis.  No fluid in the tibial or femoral tunnels.    Postoperative changes from MQTFL reconstruction, with an intact graft.    PCL and collateral ligaments are intact.  The arcuate ligament complex is intact.    Bone: No fracture, osteonecrosis, or focal lesion.    Joint: Moderate joint effusion/synovitis.  No Baker's cyst.  Impression: Postoperative changes from lateral meniscal repair.  No new meniscal tear.    Postoperative changes from ACL reconstruction with a distal quadriceps autograft.  Abnormal appearance of the ACL graft concerning for at least partial tear.  Correlate with physical exam findings.    Postoperative changes from MQTFL reconstruction, with an intact graft.    Mild tricompartmental chondromalacia, progressed from prior.    Moderate joint effusion/synovitis.    Electronically signed by: Patricio Ortiz  Date:    06/12/2024  Time:    10:02    Assessment:  1. S/P lateral meniscus repair of right knee    2. Chondromalacia, knee, right      Plan:  The patient had quite a bit of chondromalacia at the time of surgery.  I do not see anything definitive on her MRI scan that warrants repeat surgical intervention.  I discussed various treatment options including aspiration and injection.  She would like to go ahead and proceed with the aspiration and cortisone injection today.  We will see how she does and have her follow-up as needed.     No follow-ups on file.

## 2024-07-01 ENCOUNTER — PATIENT MESSAGE (OUTPATIENT)
Dept: PSYCHIATRY | Facility: CLINIC | Age: 39
End: 2024-07-01
Payer: MEDICAID

## 2024-07-01 DIAGNOSIS — F90.2 ADHD (ATTENTION DEFICIT HYPERACTIVITY DISORDER), COMBINED TYPE: ICD-10-CM

## 2024-07-01 RX ORDER — DEXTROAMPHETAMINE SACCHARATE, AMPHETAMINE ASPARTATE, DEXTROAMPHETAMINE SULFATE AND AMPHETAMINE SULFATE 5; 5; 5; 5 MG/1; MG/1; MG/1; MG/1
1 TABLET ORAL 3 TIMES DAILY
Qty: 90 TABLET | Refills: 0 | OUTPATIENT
Start: 2024-07-01

## 2024-07-02 RX ORDER — DEXTROAMPHETAMINE SACCHARATE, AMPHETAMINE ASPARTATE, DEXTROAMPHETAMINE SULFATE AND AMPHETAMINE SULFATE 5; 5; 5; 5 MG/1; MG/1; MG/1; MG/1
1 TABLET ORAL 3 TIMES DAILY
Qty: 90 TABLET | Refills: 0 | Status: SHIPPED | OUTPATIENT
Start: 2024-07-02

## 2024-07-29 ENCOUNTER — OFFICE VISIT (OUTPATIENT)
Dept: PSYCHIATRY | Facility: CLINIC | Age: 39
End: 2024-07-29
Payer: MEDICAID

## 2024-07-29 DIAGNOSIS — F90.2 ADHD (ATTENTION DEFICIT HYPERACTIVITY DISORDER), COMBINED TYPE: ICD-10-CM

## 2024-07-29 DIAGNOSIS — F41.0 GENERALIZED ANXIETY DISORDER WITH PANIC ATTACKS: ICD-10-CM

## 2024-07-29 DIAGNOSIS — F41.1 GENERALIZED ANXIETY DISORDER WITH PANIC ATTACKS: ICD-10-CM

## 2024-07-29 PROCEDURE — 99213 OFFICE O/P EST LOW 20 MIN: CPT | Mod: SA,HB,95, | Performed by: NURSE PRACTITIONER

## 2024-07-29 RX ORDER — ALPRAZOLAM 0.5 MG/1
0.5 TABLET ORAL DAILY PRN
Qty: 30 TABLET | Refills: 5 | Status: SHIPPED | OUTPATIENT
Start: 2024-07-29

## 2024-07-29 RX ORDER — DEXTROAMPHETAMINE SACCHARATE, AMPHETAMINE ASPARTATE, DEXTROAMPHETAMINE SULFATE AND AMPHETAMINE SULFATE 7.5; 7.5; 7.5; 7.5 MG/1; MG/1; MG/1; MG/1
30 TABLET ORAL 2 TIMES DAILY
Qty: 60 TABLET | Refills: 0 | Status: SHIPPED | OUTPATIENT
Start: 2024-10-01

## 2024-07-29 RX ORDER — DEXTROAMPHETAMINE SACCHARATE, AMPHETAMINE ASPARTATE, DEXTROAMPHETAMINE SULFATE AND AMPHETAMINE SULFATE 5; 5; 5; 5 MG/1; MG/1; MG/1; MG/1
1 TABLET ORAL 3 TIMES DAILY
Qty: 90 TABLET | Refills: 0 | Status: SHIPPED | OUTPATIENT
Start: 2024-08-01 | End: 2024-07-31 | Stop reason: SDUPTHER

## 2024-07-29 RX ORDER — DEXTROAMPHETAMINE SACCHARATE, AMPHETAMINE ASPARTATE, DEXTROAMPHETAMINE SULFATE AND AMPHETAMINE SULFATE 5; 5; 5; 5 MG/1; MG/1; MG/1; MG/1
1 TABLET ORAL 3 TIMES DAILY
Qty: 90 TABLET | Refills: 0 | Status: SHIPPED | OUTPATIENT
Start: 2024-09-01 | End: 2024-07-31 | Stop reason: SDUPTHER

## 2024-07-29 NOTE — PROGRESS NOTES
Outpatient Psychiatry Follow-Up Visit (MD/NP)    7/29/2024    Clinical Status of Patient:  Outpatient (Ambulatory)    Chief Complaint:  Terri Summers is a 38 y.o. female who presents today for follow-up of attention problems.  Met with patient.      Last visit was:2/20/23  The patient location is: home  The chief complaint leading to consultation is: attention problems    Visit type: audiovisual    Face to Face time with patient: 30 minutes  30 minutes of total time spent on the encounter, which includes face to face time and non-face to face time preparing to see the patient (eg, review of tests), Obtaining and/or reviewing separately obtained history, Documenting clinical information in the electronic or other health record, Independently interpreting results (not separately reported) and communicating results to the patient/family/caregiver, or Care coordination (not separately reported).     Each patient to whom he or she provides medical services by telemedicine is:  (1) informed of the relationship between the physician and patient and the respective role of any other health care provider with respect to management of the patient; and (2) notified that he or she may decline to receive medical services by telemedicine and may withdraw from such care at any time.    Interval History and Content of Current Session:  Current Psychiatric Medications/changes  Continue Adderall 30 mg po BID  Continue Xanax 0.5 mg po daily PRN panic attacks    Virtual Visit: Pt presents bright affect and euthymic mood. Reports improvement in anxiety and ADH symptoms with current medications. Reports that she is doing well. Denies SI/HI/AVH. Will continue meds    Psychotherapy:  Target symptoms: distractability, lack of focus  Why chosen therapy is appropriate versus another modality: relevant to diagnosis  Outcome monitoring methods: self-report  Therapeutic intervention type: insight oriented psychotherapy  Topics discussed/themes:  building skills sets for symptom management, symptom recognition  The patient's response to the intervention is accepting. The patient's progress toward treatment goals is good.   Duration of intervention: 14 minutes.    Review of Systems   PSYCHIATRIC: Pertinant items are noted in the narrative.  CONSTITUTIONAL: No weight gain or loss.   MUSCULOSKELETAL: No pain or stiffness of the joints.  NEUROLOGIC: No weakness, sensory changes, seizures, confusion, memory loss, tremor or other abnormal movements.  ENDOCRINE: No polydipsia or polyuria.  INTEGUMENTARY: No rashes or lacerations.  EYES: No exophthalmos, jaundice or blindness.  ENT: No dizziness, tinnitus or hearing loss.  RESPIRATORY: No shortness of breath.  CARDIOVASCULAR: No tachycardia or chest pain.  GASTROINTESTINAL: No nausea, vomiting, pain, constipation or diarrhea.  GENITOURINARY: No frequency, dysuria or sexual dysfunction.  HEMATOLOGIC/LYMPHATIC: No excessive bleeding, prolonged or excessive bleeding after dental extraction/injury.  ALLERGIC/IMMUNOLOGIC: No allergic response to materials, foods or animals at this time.    Past Medical, Family and Social History: The patient's past medical, family and social history have been reviewed and updated as appropriate within the electronic medical record - see encounter notes.    Compliance: no    Side effects: None    Risk Parameters:  Patient reports no suicidal ideation  Patient reports no homicidal ideation  Patient reports no self-injurious behavior  Patient reports no violent behavior    Exam (detailed: at least 9 elements; comprehensive: all 15 elements)   Constitutional  Vitals:  Most recent vital signs, dated greater than 90 days prior to this appointment, were not reviewed.   There were no vitals filed for this visit.     General:  unremarkable, age appropriate     Musculoskeletal  Muscle Strength/Tone:  no tremor   Gait & Station:  non-ataxic     Psychiatric  Speech:  no latency; no press   Mood &  Affect:  steady  congruent and appropriate   Thought Process:  normal and logical   Associations:  intact   Thought Content:  normal, no suicidality, no homicidality, delusions, or paranoia   Insight:  intact   Judgement: behavior is adequate to circumstances   Orientation:  grossly intact   Memory: intact for content of interview   Language: grossly intact   Attention Span & Concentration:  able to focus   Fund of Knowledge:  intact and appropriate to age and level of education     Assessment and Diagnosis   Status/Progress: Based on the examination today, the patient's problem(s) is/are improved.  New problems have not been presented today.   Co-morbidities and Lack of compliance are not complicating management of the primary condition.  There are no active rule-out diagnoses for this patient at this time.     General Impression:       ICD-10-CM ICD-9-CM   1. ADHD (attention deficit hyperactivity disorder), combined type  F90.2 314.01   2. Generalized anxiety disorder with panic attacks  F41.1 300.02    F41.0 300.01     Intervention/Counseling/Treatment Plan   Medication Management: The risks and benefits of medication were discussed with the patient.  Continue Adderall 30 mg po BID  Continue Xanax 0.5 mg po daily PRN panic attacks    Return to Clinic: 3 months    Risks, benefits, side effects and alternative treatments discussed with patient. Patient agrees with the current plan as documented.  Encouraged Patient to keep future appointments.  Take medications as prescribed and abstain from substance abuse.  Pt to present to ED for thoughts to harm herself or others

## 2024-07-31 ENCOUNTER — PATIENT MESSAGE (OUTPATIENT)
Dept: PSYCHIATRY | Facility: CLINIC | Age: 39
End: 2024-07-31
Payer: MEDICAID

## 2024-07-31 RX ORDER — DEXTROAMPHETAMINE SACCHARATE, AMPHETAMINE ASPARTATE, DEXTROAMPHETAMINE SULFATE AND AMPHETAMINE SULFATE 5; 5; 5; 5 MG/1; MG/1; MG/1; MG/1
1 TABLET ORAL 3 TIMES DAILY
Qty: 90 TABLET | Refills: 0 | Status: SHIPPED | OUTPATIENT
Start: 2024-08-31

## 2024-07-31 RX ORDER — DEXTROAMPHETAMINE SACCHARATE, AMPHETAMINE ASPARTATE, DEXTROAMPHETAMINE SULFATE AND AMPHETAMINE SULFATE 5; 5; 5; 5 MG/1; MG/1; MG/1; MG/1
1 TABLET ORAL 3 TIMES DAILY
Qty: 90 TABLET | Refills: 0 | Status: SHIPPED | OUTPATIENT
Start: 2024-07-31

## 2024-11-04 ENCOUNTER — OFFICE VISIT (OUTPATIENT)
Dept: PSYCHIATRY | Facility: CLINIC | Age: 39
End: 2024-11-04
Payer: MEDICAID

## 2024-11-04 DIAGNOSIS — F41.1 GENERALIZED ANXIETY DISORDER WITH PANIC ATTACKS: ICD-10-CM

## 2024-11-04 DIAGNOSIS — F41.0 GENERALIZED ANXIETY DISORDER WITH PANIC ATTACKS: ICD-10-CM

## 2024-11-04 DIAGNOSIS — F90.2 ADHD (ATTENTION DEFICIT HYPERACTIVITY DISORDER), COMBINED TYPE: ICD-10-CM

## 2024-11-04 RX ORDER — ALPRAZOLAM 0.5 MG/1
0.5 TABLET ORAL DAILY PRN
Qty: 30 TABLET | Refills: 5 | Status: SHIPPED | OUTPATIENT
Start: 2024-11-04

## 2024-11-04 RX ORDER — DEXTROAMPHETAMINE SACCHARATE, AMPHETAMINE ASPARTATE, DEXTROAMPHETAMINE SULFATE AND AMPHETAMINE SULFATE 5; 5; 5; 5 MG/1; MG/1; MG/1; MG/1
1 TABLET ORAL 3 TIMES DAILY
Qty: 90 TABLET | Refills: 0 | Status: SHIPPED | OUTPATIENT
Start: 2025-01-01

## 2024-11-04 RX ORDER — DEXTROAMPHETAMINE SACCHARATE, AMPHETAMINE ASPARTATE, DEXTROAMPHETAMINE SULFATE AND AMPHETAMINE SULFATE 5; 5; 5; 5 MG/1; MG/1; MG/1; MG/1
1 TABLET ORAL 3 TIMES DAILY
Qty: 90 TABLET | Refills: 0 | Status: SHIPPED | OUTPATIENT
Start: 2024-11-04

## 2024-11-04 RX ORDER — DEXTROAMPHETAMINE SACCHARATE, AMPHETAMINE ASPARTATE, DEXTROAMPHETAMINE SULFATE AND AMPHETAMINE SULFATE 5; 5; 5; 5 MG/1; MG/1; MG/1; MG/1
1 TABLET ORAL 3 TIMES DAILY
Qty: 90 TABLET | Refills: 0 | Status: SHIPPED | OUTPATIENT
Start: 2024-12-03

## 2024-11-04 NOTE — PROGRESS NOTES
Outpatient Psychiatry Follow-Up Visit (MD/NP)    11/4/2024    Clinical Status of Patient:  Outpatient (Ambulatory)    Chief Complaint:  Terri Summers is a 39 y.o. female who presents today for follow-up of attention problems.  Met with patient.      Last visit was:2/20/23  The patient location is: home  The chief complaint leading to consultation is: attention problems    Visit type: audiovisual    Face to Face time with patient: 30 minutes  35 minutes of total time spent on the encounter, which includes face to face time and non-face to face time preparing to see the patient (eg, review of tests), Obtaining and/or reviewing separately obtained history, Documenting clinical information in the electronic or other health record, Independently interpreting results (not separately reported) and communicating results to the patient/family/caregiver, or Care coordination (not separately reported).     Each patient to whom he or she provides medical services by telemedicine is:  (1) informed of the relationship between the physician and patient and the respective role of any other health care provider with respect to management of the patient; and (2) notified that he or she may decline to receive medical services by telemedicine and may withdraw from such care at any time.    Interval History and Content of Current Session:  Current Psychiatric Medications/changes  Continue Adderall 20 mg po TID  Continue Xanax 0.5 mg po daily PRN panic attacks    Virtual Visit: Pt presents bright affect and euthymic mood. Reports improvement in anxiety and ADH symptoms with current medications. Reports that she is doing well. Denies SI/HI/AVH. No panic attacks. Will continue meds    Psychotherapy:  Target symptoms: distractability, lack of focus  Why chosen therapy is appropriate versus another modality: relevant to diagnosis  Outcome monitoring methods: self-report  Therapeutic intervention type: insight oriented psychotherapy  Topics  discussed/themes: building skills sets for symptom management, symptom recognition  The patient's response to the intervention is accepting. The patient's progress toward treatment goals is good.   Duration of intervention: 14 minutes.    Review of Systems   PSYCHIATRIC: Pertinant items are noted in the narrative.  CONSTITUTIONAL: No weight gain or loss.   MUSCULOSKELETAL: No pain or stiffness of the joints.  NEUROLOGIC: No weakness, sensory changes, seizures, confusion, memory loss, tremor or other abnormal movements.  ENDOCRINE: No polydipsia or polyuria.  INTEGUMENTARY: No rashes or lacerations.  EYES: No exophthalmos, jaundice or blindness.  ENT: No dizziness, tinnitus or hearing loss.  RESPIRATORY: No shortness of breath.  CARDIOVASCULAR: No tachycardia or chest pain.  GASTROINTESTINAL: No nausea, vomiting, pain, constipation or diarrhea.  GENITOURINARY: No frequency, dysuria or sexual dysfunction.  HEMATOLOGIC/LYMPHATIC: No excessive bleeding, prolonged or excessive bleeding after dental extraction/injury.  ALLERGIC/IMMUNOLOGIC: No allergic response to materials, foods or animals at this time.    Past Medical, Family and Social History: The patient's past medical, family and social history have been reviewed and updated as appropriate within the electronic medical record - see encounter notes.    Compliance: no    Side effects: None    Risk Parameters:  Patient reports no suicidal ideation  Patient reports no homicidal ideation  Patient reports no self-injurious behavior  Patient reports no violent behavior    Exam (detailed: at least 9 elements; comprehensive: all 15 elements)   Constitutional  Vitals:  Most recent vital signs, dated greater than 90 days prior to this appointment, were not reviewed.   There were no vitals filed for this visit.     General:  unremarkable, age appropriate     Musculoskeletal  Muscle Strength/Tone:  no tremor   Gait & Station:  non-ataxic     Psychiatric  Speech:  no latency; no  press   Mood & Affect:  steady  congruent and appropriate   Thought Process:  normal and logical   Associations:  intact   Thought Content:  normal, no suicidality, no homicidality, delusions, or paranoia   Insight:  intact   Judgement: behavior is adequate to circumstances   Orientation:  grossly intact   Memory: intact for content of interview   Language: grossly intact   Attention Span & Concentration:  able to focus   Fund of Knowledge:  intact and appropriate to age and level of education     Assessment and Diagnosis   Status/Progress: Based on the examination today, the patient's problem(s) is/are improved.  New problems have not been presented today.   Co-morbidities and Lack of compliance are not complicating management of the primary condition.  There are no active rule-out diagnoses for this patient at this time.     General Impression:       ICD-10-CM ICD-9-CM   1. ADHD (attention deficit hyperactivity disorder), combined type  F90.2 314.01   2. Generalized anxiety disorder with panic attacks  F41.1 300.02    F41.0 300.01       Intervention/Counseling/Treatment Plan   Medication Management: The risks and benefits of medication were discussed with the patient.  Continue Adderall 20 mg po TID  Continue Xanax 0.5 mg po daily PRN panic attacks    Return to Clinic: 3 months    Risks, benefits, side effects and alternative treatments discussed with patient. Patient agrees with the current plan as documented.  Encouraged Patient to keep future appointments.  Take medications as prescribed and abstain from substance abuse.  Pt to present to ED for thoughts to harm herself or others

## 2025-01-22 ENCOUNTER — PATIENT MESSAGE (OUTPATIENT)
Dept: OBSTETRICS AND GYNECOLOGY | Facility: CLINIC | Age: 40
End: 2025-01-22
Payer: MEDICAID

## 2025-01-29 DIAGNOSIS — F90.2 ADHD (ATTENTION DEFICIT HYPERACTIVITY DISORDER), COMBINED TYPE: ICD-10-CM

## 2025-01-29 RX ORDER — DEXTROAMPHETAMINE SACCHARATE, AMPHETAMINE ASPARTATE, DEXTROAMPHETAMINE SULFATE AND AMPHETAMINE SULFATE 5; 5; 5; 5 MG/1; MG/1; MG/1; MG/1
1 TABLET ORAL 3 TIMES DAILY
Qty: 90 TABLET | Refills: 0 | Status: SHIPPED | OUTPATIENT
Start: 2025-01-29

## 2025-02-20 ENCOUNTER — OFFICE VISIT (OUTPATIENT)
Dept: PSYCHIATRY | Facility: CLINIC | Age: 40
End: 2025-02-20
Payer: MEDICAID

## 2025-02-20 DIAGNOSIS — F90.2 ADHD (ATTENTION DEFICIT HYPERACTIVITY DISORDER), COMBINED TYPE: Primary | ICD-10-CM

## 2025-02-20 DIAGNOSIS — F41.0 GENERALIZED ANXIETY DISORDER WITH PANIC ATTACKS: ICD-10-CM

## 2025-02-20 DIAGNOSIS — F41.1 GENERALIZED ANXIETY DISORDER WITH PANIC ATTACKS: ICD-10-CM

## 2025-02-20 RX ORDER — DEXTROAMPHETAMINE SACCHARATE, AMPHETAMINE ASPARTATE, DEXTROAMPHETAMINE SULFATE AND AMPHETAMINE SULFATE 7.5; 7.5; 7.5; 7.5 MG/1; MG/1; MG/1; MG/1
30 TABLET ORAL 2 TIMES DAILY
Qty: 60 TABLET | Refills: 0 | Status: SHIPPED | OUTPATIENT
Start: 2025-03-19

## 2025-02-20 RX ORDER — DEXTROAMPHETAMINE SACCHARATE, AMPHETAMINE ASPARTATE, DEXTROAMPHETAMINE SULFATE AND AMPHETAMINE SULFATE 7.5; 7.5; 7.5; 7.5 MG/1; MG/1; MG/1; MG/1
30 TABLET ORAL 2 TIMES DAILY
Qty: 60 TABLET | Refills: 0 | Status: SHIPPED | OUTPATIENT
Start: 2025-04-17

## 2025-02-20 RX ORDER — DEXTROAMPHETAMINE SACCHARATE, AMPHETAMINE ASPARTATE, DEXTROAMPHETAMINE SULFATE AND AMPHETAMINE SULFATE 7.5; 7.5; 7.5; 7.5 MG/1; MG/1; MG/1; MG/1
1 TABLET ORAL 2 TIMES DAILY
Qty: 60 TABLET | Refills: 0 | Status: SHIPPED | OUTPATIENT
Start: 2025-02-20

## 2025-02-20 NOTE — PROGRESS NOTES
Outpatient Psychiatry Follow-Up Visit (MD/NP)    2/20/2025    Clinical Status of Patient:  Outpatient (Ambulatory)    Chief Complaint:  Terri Summers is a 39 y.o. female who presents today for follow-up of attention problems.  Met with patient.      Last visit was:2/20/23  The patient location is: home  The chief complaint leading to consultation is: attention problems    Visit type: audiovisual    Face to Face time with patient: 30 minutes  35 minutes of total time spent on the encounter, which includes face to face time and non-face to face time preparing to see the patient (eg, review of tests), Obtaining and/or reviewing separately obtained history, Documenting clinical information in the electronic or other health record, Independently interpreting results (not separately reported) and communicating results to the patient/family/caregiver, or Care coordination (not separately reported).     Each patient to whom he or she provides medical services by telemedicine is:  (1) informed of the relationship between the physician and patient and the respective role of any other health care provider with respect to management of the patient; and (2) notified that he or she may decline to receive medical services by telemedicine and may withdraw from such care at any time.    Interval History and Content of Current Session:  Current Psychiatric Medications/changes  Continue Adderall 20 mg po TID  Continue Xanax 0.5 mg po daily PRN panic attacks    Virtual Visit: Pt presents bright affect and euthymic mood. Reports improvement in anxiety and ADH symptoms with current medications. Reports that she is doing well. Denies SI/HI/AVH. No panic attacks. Will continue meds    Psychotherapy:  Target symptoms: distractability, lack of focus  Why chosen therapy is appropriate versus another modality: relevant to diagnosis  Outcome monitoring methods: self-report  Therapeutic intervention type: insight oriented psychotherapy  Topics  discussed/themes: building skills sets for symptom management, symptom recognition  The patient's response to the intervention is accepting. The patient's progress toward treatment goals is good.   Duration of intervention: 14 minutes.    Review of Systems   PSYCHIATRIC: Pertinant items are noted in the narrative.  CONSTITUTIONAL: No weight gain or loss.   MUSCULOSKELETAL: No pain or stiffness of the joints.  NEUROLOGIC: No weakness, sensory changes, seizures, confusion, memory loss, tremor or other abnormal movements.  ENDOCRINE: No polydipsia or polyuria.  INTEGUMENTARY: No rashes or lacerations.  EYES: No exophthalmos, jaundice or blindness.  ENT: No dizziness, tinnitus or hearing loss.  RESPIRATORY: No shortness of breath.  CARDIOVASCULAR: No tachycardia or chest pain.  GASTROINTESTINAL: No nausea, vomiting, pain, constipation or diarrhea.  GENITOURINARY: No frequency, dysuria or sexual dysfunction.  HEMATOLOGIC/LYMPHATIC: No excessive bleeding, prolonged or excessive bleeding after dental extraction/injury.  ALLERGIC/IMMUNOLOGIC: No allergic response to materials, foods or animals at this time.    Past Medical, Family and Social History: The patient's past medical, family and social history have been reviewed and updated as appropriate within the electronic medical record - see encounter notes.    Compliance: no    Side effects: None    Risk Parameters:  Patient reports no suicidal ideation  Patient reports no homicidal ideation  Patient reports no self-injurious behavior  Patient reports no violent behavior    Exam (detailed: at least 9 elements; comprehensive: all 15 elements)   Constitutional  Vitals:  Most recent vital signs, dated greater than 90 days prior to this appointment, were not reviewed.   There were no vitals filed for this visit.     General:  unremarkable, age appropriate     Musculoskeletal  Muscle Strength/Tone:  no tremor   Gait & Station:  non-ataxic     Psychiatric  Speech:  no latency; no  press   Mood & Affect:  steady  congruent and appropriate   Thought Process:  normal and logical   Associations:  intact   Thought Content:  normal, no suicidality, no homicidality, delusions, or paranoia   Insight:  intact   Judgement: behavior is adequate to circumstances   Orientation:  grossly intact   Memory: intact for content of interview   Language: grossly intact   Attention Span & Concentration:  able to focus   Fund of Knowledge:  intact and appropriate to age and level of education     Assessment and Diagnosis   Status/Progress: Based on the examination today, the patient's problem(s) is/are improved.  New problems have not been presented today.   Co-morbidities and Lack of compliance are not complicating management of the primary condition.  There are no active rule-out diagnoses for this patient at this time.     General Impression:       ICD-10-CM ICD-9-CM   1. ADHD (attention deficit hyperactivity disorder), combined type  F90.2 314.01   2. Generalized anxiety disorder with panic attacks  F41.1 300.02    F41.0 300.01       Intervention/Counseling/Treatment Plan   Medication Management: The risks and benefits of medication were discussed with the patient.  Continue Adderall 20 mg po TID  Continue Xanax 0.5 mg po daily PRN panic attacks    Return to Clinic: 3 months    Risks, benefits, side effects and alternative treatments discussed with patient. Patient agrees with the current plan as documented.  Encouraged Patient to keep future appointments.  Take medications as prescribed and abstain from substance abuse.  Pt to present to ED for thoughts to harm herself or others

## 2025-04-10 ENCOUNTER — OFFICE VISIT (OUTPATIENT)
Dept: OBSTETRICS AND GYNECOLOGY | Facility: CLINIC | Age: 40
End: 2025-04-10
Payer: MEDICAID

## 2025-04-10 VITALS
SYSTOLIC BLOOD PRESSURE: 136 MMHG | DIASTOLIC BLOOD PRESSURE: 93 MMHG | BODY MASS INDEX: 31.78 KG/M2 | WEIGHT: 209.69 LBS | HEIGHT: 68 IN

## 2025-04-10 DIAGNOSIS — Z01.419 WELL WOMAN EXAM WITH ROUTINE GYNECOLOGICAL EXAM: Primary | ICD-10-CM

## 2025-04-10 DIAGNOSIS — Z30.433 ENCOUNTER FOR IUD REMOVAL AND REINSERTION: ICD-10-CM

## 2025-04-10 PROCEDURE — 99999 PR PBB SHADOW E&M-EST. PATIENT-LVL III: CPT | Mod: PBBFAC,,, | Performed by: STUDENT IN AN ORGANIZED HEALTH CARE EDUCATION/TRAINING PROGRAM

## 2025-04-10 PROCEDURE — 99213 OFFICE O/P EST LOW 20 MIN: CPT | Mod: PBBFAC,PO | Performed by: STUDENT IN AN ORGANIZED HEALTH CARE EDUCATION/TRAINING PROGRAM

## 2025-04-10 NOTE — PROGRESS NOTES
CC: Well woman exam    HPI:  Terri Summers is a 39 y.o. female  presents for a well woman exam.  She has no issues, problems, or complaints. Mirena IUD placed 3/2017, works well for patient has no bleeding would like to replace      Patient history:   Past Medical History:   Diagnosis Date    ADHD (attention deficit hyperactivity disorder)     Anxiety     History of psychiatric care     Hypertension     Psychiatric exam requested by authority     Psychiatric problem     Therapy      Past Surgical History:   Procedure Laterality Date     SECTION      DILATION AND CURETTAGE OF UTERUS      missed ab    KNEE ARTHROSCOPY W/ ACL RECONSTRUCTION Right 12/15/2022    Procedure: 1. RECONSTRUCTION, KNEE, ACL, ARTHROSCOPIC WITH QUADRICEPS AUTOGRAFT 2. MQTFL RECONSTRUCTION WITH SEMITENDINOSIS ALLOGRAFT;  Surgeon: Shan Crystal MD;  Location: [facility];  Service: Orthopedics;  Laterality: Right;  Linvate ACL set/meniscal implants, tendon strippers, double blade knife, interference screws, PopLock anchors  Linvatech martha notified   Elizabeth notified 10mm double k    REPAIR OF LIGAMENT Right     thumb     OB History    Para Term  AB Living   1 1 1   2   SAB IAB Ectopic Multiple Live Births      1 2      # Outcome Date GA Lbr [place]/2nd Weight Sex Type Anes PTL Lv   1A Term 13    F CS-LTranv   BRENNAN   1B Term 13    F CS-LTranv   BRENNAN       GYN  Menopausal: No  History of abnormal paps: DENIES  Abnormal or postmenopausal bleeding: DENIES  History of abnormal mammograms: ovarian    Family history of breast or ovarian cancer: DENIES  Any breast masses, pain, skin changes, or nipple discharge: DENIES  Possible recent STD exposure: denies  Contraception: Mirena    Pap: Done today  Mammogram:  ordered      Family History   Problem Relation Name Age of Onset    ADD / ADHD Father      Ovarian cancer Mother      Ovarian cancer Maternal Grandmother      Breast cancer Neg Hx      Colon cancer Neg Hx    "    Social History[1]  Allergies: Hydrochlorothiazide  Current Medications[2]       ROS:  GENERAL: Denies weight gain or weight loss. Feeling well overall.   SKIN: Denies rash or lesions.   HEAD: Denies head injury or headache.   NODES: Denies enlarged lymph nodes.   CHEST: Denies chest pain or shortness of breath.   CARDIOVASCULAR: Denies palpitations or left sided chest pain.   ABDOMEN: No abdominal pain, constipation, diarrhea, nausea, vomiting or rectal bleeding.   URINARY: No frequency, dysuria, hematuria, or burning on urination.  REPRODUCTIVE: See HPI.   BREASTS: The patient performs breast self-examination and denies pain, lumps, or nipple discharge.   HEMATOLOGIC: No easy bruisability or excessive bleeding.  MUSCULOSKELETAL: Denies joint pain or swelling.   NEUROLOGIC: Denies syncope or weakness.   PSYCHIATRIC: Denies depression, anxiety or mood swings.    Objective:   BP (!) 136/93 (BP Location: Left arm, Patient Position: Sitting)   Ht 5' 8" (1.727 m)   Wt 95.1 kg (209 lb 10.5 oz)   LMP  (LMP Unknown)   BMI 31.88 kg/m²       Physical Exam:  APPEARANCE: Well nourished, well developed, in no acute distress.  AFFECT: WNL, alert and oriented x 3  SKIN: No acne or hirsutism  NECK: Neck symmetric without masses or thyromegaly  CHEST: Good respiratory effect  ABDOMEN: Soft.  No tenderness or masses.  No hepatosplenomegaly.  No hernias.  BREASTS: deferred  PELVIC: Normal external genitalia without lesions.  Normal hair distribution.  Adequate perineal body, normal urethral meatus.  Vagina moist and well rugated without lesions or discharge.  Cervix pink, without lesions, discharge or tenderness.  IUD strings not visualized. No significant cystocele or rectocele.  Bimanual exam shows uterus to be normal size, regular, mobile and nontender.  Adnexa without masses or tenderness.   EXTREMITIES: No edema.    ASSESSMENT AND PLAN  1. Well woman exam with routine gynecological exam  Liquid-Based Pap Smear, Screening "    Mammo Digital Screening Bilat w/ Tony (XPD)      2. Encounter for IUD removal and reinsertion  Device Authorization Order          Annual exam  pelvic exam: WNL  Patient counseled on ASCCP guidelines for cervical cytology screening  Cervical screening: done today  Patient counseled on current recommendations for breast cancer screening  Mammogram screening: ordered/scheduled  STD testing: not requested today  Contraception: Mirena IUD, will remove and replace at same visit. Device authorization ordered and scheduled   Osteoporosis screening at 65      She was counseled to follow up with her PCP for other routine health maintenance      Follow up for Mirena removal/re-insertion .      Vee Hu MD  OBGYN [facility]            [1]   Social History  Tobacco Use    Smoking status: Every Day     Types: Vaping with nicotine    Smokeless tobacco: Never   Substance Use Topics    Alcohol use: Yes     Comment: rare drink occasionally   [2]   Current Outpatient Medications:     ALPRAZolam (XANAX) 0.5 MG tablet, Take 1 tablet (0.5 mg total) by mouth daily as needed for Anxiety., Disp: 30 tablet, Rfl: 5    dextroamphetamine-amphetamine 30 mg Tab, Take 1 tablet (30 mg total) by mouth 2 (two) times daily., Disp: 60 tablet, Rfl: 0    [START ON 4/17/2025] dextroamphetamine-amphetamine 30 mg Tab, Take 1 tablet (30 mg total) by mouth 2 (two) times daily., Disp: 60 tablet, Rfl: 0    ergocalciferol (ERGOCALCIFEROL) 50,000 unit Cap, Take 50,000 Units by mouth every 7 days., Disp: , Rfl:     acetaminophen (TYLENOL) 325 MG tablet, Take 325 mg by mouth every 6 (six) hours as needed for Pain., Disp: , Rfl:     dextroamphetamine-amphetamine (ADDERALL) 20 mg tablet, Take 1 tablet by mouth 3 (three) times daily., Disp: 90 tablet, Rfl: 0    dextroamphetamine-amphetamine (ADDERALL) 20 mg tablet, Take 1 tablet by mouth 3 (three) times daily., Disp: 90 tablet, Rfl: 0    dextroamphetamine-amphetamine (ADDERALL) 20 mg tablet, Take 1  tablet by mouth 3 (three) times daily., Disp: 90 tablet, Rfl: 0    dextroamphetamine-amphetamine 30 mg Tab, Take 1 tablet (30 mg total) by mouth 2 (two) times daily., Disp: 60 tablet, Rfl: 0    losartan (COZAAR) 50 MG tablet, Take 1 tablet by mouth once daily., Disp: , Rfl:     meloxicam (MOBIC) 7.5 MG tablet, Take 1 tablet (7.5 mg total) by mouth once daily. (Patient not taking: Reported on 4/10/2025), Disp: 28 tablet, Rfl: 0    traMADoL (ULTRAM) 50 mg tablet, Take 1 tablet (50 mg total) by mouth every 6 (six) hours as needed for Pain., Disp: 20 tablet, Rfl: 0

## 2025-04-21 ENCOUNTER — OFFICE VISIT (OUTPATIENT)
Dept: PSYCHIATRY | Facility: CLINIC | Age: 40
End: 2025-04-21
Payer: MEDICAID

## 2025-04-21 DIAGNOSIS — F90.2 ADHD (ATTENTION DEFICIT HYPERACTIVITY DISORDER), COMBINED TYPE: ICD-10-CM

## 2025-04-21 DIAGNOSIS — F41.1 GENERALIZED ANXIETY DISORDER WITH PANIC ATTACKS: ICD-10-CM

## 2025-04-21 DIAGNOSIS — F41.0 GENERALIZED ANXIETY DISORDER WITH PANIC ATTACKS: ICD-10-CM

## 2025-04-21 PROCEDURE — 98006 SYNCH AUDIO-VIDEO EST MOD 30: CPT | Mod: SA,HB,95, | Performed by: NURSE PRACTITIONER

## 2025-04-21 RX ORDER — DEXTROAMPHETAMINE SACCHARATE, AMPHETAMINE ASPARTATE, DEXTROAMPHETAMINE SULFATE AND AMPHETAMINE SULFATE 7.5; 7.5; 7.5; 7.5 MG/1; MG/1; MG/1; MG/1
1 TABLET ORAL 2 TIMES DAILY
Qty: 60 TABLET | Refills: 0 | Status: SHIPPED | OUTPATIENT
Start: 2025-06-18

## 2025-04-21 RX ORDER — DEXTROAMPHETAMINE SACCHARATE, AMPHETAMINE ASPARTATE, DEXTROAMPHETAMINE SULFATE AND AMPHETAMINE SULFATE 7.5; 7.5; 7.5; 7.5 MG/1; MG/1; MG/1; MG/1
30 TABLET ORAL 2 TIMES DAILY
Qty: 60 TABLET | Refills: 0 | Status: SHIPPED | OUTPATIENT
Start: 2025-04-21

## 2025-04-21 RX ORDER — ALPRAZOLAM 0.5 MG/1
0.5 TABLET ORAL DAILY PRN
Qty: 30 TABLET | Refills: 5 | Status: SHIPPED | OUTPATIENT
Start: 2025-04-21

## 2025-04-21 RX ORDER — DEXTROAMPHETAMINE SACCHARATE, AMPHETAMINE ASPARTATE, DEXTROAMPHETAMINE SULFATE AND AMPHETAMINE SULFATE 7.5; 7.5; 7.5; 7.5 MG/1; MG/1; MG/1; MG/1
30 TABLET ORAL 2 TIMES DAILY
Qty: 60 TABLET | Refills: 0 | Status: SHIPPED | OUTPATIENT
Start: 2025-05-20

## 2025-04-21 NOTE — PROGRESS NOTES
Outpatient Psychiatry Follow-Up Visit (MD/NP)    4/21/2025    Clinical Status of Patient:  Outpatient (Ambulatory)    Chief Complaint:  Terri Summers is a 39 y.o. female who presents today for follow-up of attention problems.  Met with patient.      Last visit was:2/20/23  The patient location is: home  The chief complaint leading to consultation is: attention problems    Visit type: audiovisual    Face to Face time with patient: 30 minutes  35 minutes of total time spent on the encounter, which includes face to face time and non-face to face time preparing to see the patient (eg, review of tests), Obtaining and/or reviewing separately obtained history, Documenting clinical information in the electronic or other health record, Independently interpreting results (not separately reported) and communicating results to the patient/family/caregiver, or Care coordination (not separately reported).     Each patient to whom he or she provides medical services by telemedicine is:  (1) informed of the relationship between the physician and patient and the respective role of any other health care provider with respect to management of the patient; and (2) notified that he or she may decline to receive medical services by telemedicine and may withdraw from such care at any time.    Interval History and Content of Current Session:  Current Psychiatric Medications/changes  Continue Adderall 20 mg po TID  Continue Xanax 0.5 mg po daily PRN panic attacks    Virtual Visit: Pt presents bright affect and euthymic mood. Reports improvement in anxiety and ADH symptoms with current medications. Reports that she is doing well. Denies SI/HI/AVH. No panic attacks. Will continue meds    Psychotherapy:  Target symptoms: distractability, lack of focus  Why chosen therapy is appropriate versus another modality: relevant to diagnosis  Outcome monitoring methods: self-report  Therapeutic intervention type: insight oriented psychotherapy  Topics  discussed/themes: building skills sets for symptom management, symptom recognition  The patient's response to the intervention is accepting. The patient's progress toward treatment goals is good.   Duration of intervention: 14 minutes.    Review of Systems   PSYCHIATRIC: Pertinant items are noted in the narrative.  CONSTITUTIONAL: No weight gain or loss.   MUSCULOSKELETAL: No pain or stiffness of the joints.  NEUROLOGIC: No weakness, sensory changes, seizures, confusion, memory loss, tremor or other abnormal movements.  ENDOCRINE: No polydipsia or polyuria.  INTEGUMENTARY: No rashes or lacerations.  EYES: No exophthalmos, jaundice or blindness.  ENT: No dizziness, tinnitus or hearing loss.  RESPIRATORY: No shortness of breath.  CARDIOVASCULAR: No tachycardia or chest pain.  GASTROINTESTINAL: No nausea, vomiting, pain, constipation or diarrhea.  GENITOURINARY: No frequency, dysuria or sexual dysfunction.  HEMATOLOGIC/LYMPHATIC: No excessive bleeding, prolonged or excessive bleeding after dental extraction/injury.  ALLERGIC/IMMUNOLOGIC: No allergic response to materials, foods or animals at this time.    Past Medical, Family and Social History: The patient's past medical, family and social history have been reviewed and updated as appropriate within the electronic medical record - see encounter notes.    Compliance: no    Side effects: None    Risk Parameters:  Patient reports no suicidal ideation  Patient reports no homicidal ideation  Patient reports no self-injurious behavior  Patient reports no violent behavior    Exam (detailed: at least 9 elements; comprehensive: all 15 elements)   Constitutional  Vitals:  Most recent vital signs, dated greater than 90 days prior to this appointment, were not reviewed.   There were no vitals filed for this visit.     General:  unremarkable, age appropriate     Musculoskeletal  Muscle Strength/Tone:  no tremor   Gait & Station:  non-ataxic     Psychiatric  Speech:  no latency; no  press   Mood & Affect:  steady  congruent and appropriate   Thought Process:  normal and logical   Associations:  intact   Thought Content:  normal, no suicidality, no homicidality, delusions, or paranoia   Insight:  intact   Judgement: behavior is adequate to circumstances   Orientation:  grossly intact   Memory: intact for content of interview   Language: grossly intact   Attention Span & Concentration:  able to focus   Fund of Knowledge:  intact and appropriate to age and level of education     Assessment and Diagnosis   Status/Progress: Based on the examination today, the patient's problem(s) is/are improved.  New problems have not been presented today.   Co-morbidities and Lack of compliance are not complicating management of the primary condition.  There are no active rule-out diagnoses for this patient at this time.     General Impression:       ICD-10-CM ICD-9-CM   1. ADHD (attention deficit hyperactivity disorder), combined type  F90.2 314.01   2. Generalized anxiety disorder with panic attacks  F41.1 300.02    F41.0 300.01       Intervention/Counseling/Treatment Plan   Medication Management: The risks and benefits of medication were discussed with the patient.  Continue Adderall 20 mg po TID  Continue Xanax 0.5 mg po daily PRN panic attacks    Return to Clinic: 3 months    Risks, benefits, side effects and alternative treatments discussed with patient. Patient agrees with the current plan as documented.  Encouraged Patient to keep future appointments.  Take medications as prescribed and abstain from substance abuse.  Pt to present to ED for thoughts to harm herself or others

## 2025-04-24 ENCOUNTER — RESULTS FOLLOW-UP (OUTPATIENT)
Dept: OBSTETRICS AND GYNECOLOGY | Facility: CLINIC | Age: 40
End: 2025-04-24
Payer: MEDICAID

## 2025-04-24 RX ORDER — FLUTICASONE PROPIONATE 50 MCG
1 SPRAY, SUSPENSION (ML) NASAL 2 TIMES DAILY
Qty: 18.2 ML | Refills: 2 | Status: SHIPPED | OUTPATIENT
Start: 2025-04-24

## 2025-04-24 RX ORDER — CETIRIZINE HYDROCHLORIDE 10 MG/1
10 TABLET ORAL DAILY
Qty: 90 TABLET | Refills: 3 | Status: SHIPPED | OUTPATIENT
Start: 2025-04-24 | End: 2026-04-24

## 2025-05-12 ENCOUNTER — HOSPITAL ENCOUNTER (OUTPATIENT)
Dept: RADIOLOGY | Facility: HOSPITAL | Age: 40
Discharge: HOME | End: 2025-05-12
Attending: STUDENT IN AN ORGANIZED HEALTH CARE EDUCATION/TRAINING PROGRAM
Payer: MEDICAID

## 2025-05-12 DIAGNOSIS — Z01.419 WELL WOMAN EXAM WITH ROUTINE GYNECOLOGICAL EXAM: ICD-10-CM

## 2025-05-12 PROCEDURE — 77067 SCR MAMMO BI INCL CAD: CPT | Mod: 26,,, | Performed by: RADIOLOGY

## 2025-05-12 PROCEDURE — 77063 BREAST TOMOSYNTHESIS BI: CPT | Mod: 26,,, | Performed by: RADIOLOGY

## 2025-05-12 PROCEDURE — 77067 SCR MAMMO BI INCL CAD: CPT | Mod: TC

## 2025-05-13 ENCOUNTER — TELEPHONE (OUTPATIENT)
Dept: OBSTETRICS AND GYNECOLOGY | Facility: CLINIC | Age: 40
End: 2025-05-13
Payer: MEDICAID

## 2025-05-13 NOTE — TELEPHONE ENCOUNTER
Attempted to contact pt regarding her phone call about scheduling  her iud removal/insertion. I left a message for the pt to return the phone call.

## 2025-07-23 DIAGNOSIS — F90.2 ADHD (ATTENTION DEFICIT HYPERACTIVITY DISORDER), COMBINED TYPE: ICD-10-CM

## 2025-07-23 RX ORDER — DEXTROAMPHETAMINE SACCHARATE, AMPHETAMINE ASPARTATE, DEXTROAMPHETAMINE SULFATE AND AMPHETAMINE SULFATE 7.5; 7.5; 7.5; 7.5 MG/1; MG/1; MG/1; MG/1
1 TABLET ORAL 2 TIMES DAILY
Qty: 60 TABLET | Refills: 0 | Status: SHIPPED | OUTPATIENT
Start: 2025-07-23 | End: 2025-07-25 | Stop reason: SDUPTHER

## 2025-07-25 ENCOUNTER — OFFICE VISIT (OUTPATIENT)
Dept: PSYCHIATRY | Facility: CLINIC | Age: 40
End: 2025-07-25
Payer: MEDICAID

## 2025-07-25 DIAGNOSIS — F41.1 GENERALIZED ANXIETY DISORDER WITH PANIC ATTACKS: ICD-10-CM

## 2025-07-25 DIAGNOSIS — F41.0 GENERALIZED ANXIETY DISORDER WITH PANIC ATTACKS: ICD-10-CM

## 2025-07-25 DIAGNOSIS — F90.2 ADHD (ATTENTION DEFICIT HYPERACTIVITY DISORDER), COMBINED TYPE: ICD-10-CM

## 2025-07-25 PROCEDURE — 1159F MED LIST DOCD IN RCRD: CPT | Mod: CPTII,95,, | Performed by: NURSE PRACTITIONER

## 2025-07-25 PROCEDURE — 98006 SYNCH AUDIO-VIDEO EST MOD 30: CPT | Mod: SA,HB,95, | Performed by: NURSE PRACTITIONER

## 2025-07-25 PROCEDURE — 1160F RVW MEDS BY RX/DR IN RCRD: CPT | Mod: CPTII,95,, | Performed by: NURSE PRACTITIONER

## 2025-07-25 RX ORDER — ALPRAZOLAM 0.5 MG/1
0.5 TABLET ORAL DAILY PRN
Qty: 30 TABLET | Refills: 5 | Status: SHIPPED | OUTPATIENT
Start: 2025-07-25

## 2025-07-25 RX ORDER — DEXTROAMPHETAMINE SACCHARATE, AMPHETAMINE ASPARTATE, DEXTROAMPHETAMINE SULFATE AND AMPHETAMINE SULFATE 7.5; 7.5; 7.5; 7.5 MG/1; MG/1; MG/1; MG/1
30 TABLET ORAL 2 TIMES DAILY
Qty: 60 TABLET | Refills: 0 | Status: SHIPPED | OUTPATIENT
Start: 2025-10-18

## 2025-07-25 RX ORDER — DEXTROAMPHETAMINE SACCHARATE, AMPHETAMINE ASPARTATE, DEXTROAMPHETAMINE SULFATE AND AMPHETAMINE SULFATE 7.5; 7.5; 7.5; 7.5 MG/1; MG/1; MG/1; MG/1
30 TABLET ORAL 2 TIMES DAILY
Qty: 60 TABLET | Refills: 0 | Status: SHIPPED | OUTPATIENT
Start: 2025-08-21

## 2025-07-25 RX ORDER — DEXTROAMPHETAMINE SACCHARATE, AMPHETAMINE ASPARTATE, DEXTROAMPHETAMINE SULFATE AND AMPHETAMINE SULFATE 7.5; 7.5; 7.5; 7.5 MG/1; MG/1; MG/1; MG/1
1 TABLET ORAL 2 TIMES DAILY
Qty: 60 TABLET | Refills: 0 | Status: SHIPPED | OUTPATIENT
Start: 2025-09-19

## 2025-07-25 NOTE — PROGRESS NOTES
[id number]}    Outpatient Psychiatry Follow-Up Visit (MD/NP)    7/25/2025    Clinical Status of Patient:  Outpatient (Ambulatory)    Chief Complaint:  Terri Summers is a 39 y.o. female who presents today for follow-up of attention problems.  Met with patient.      Last visit was: 4/21/25. Chart and  reviewed.  The patient location is: home  The chief complaint leading to consultation is: attention problems    Visit type: audiovisual    Face to Face time with patient: 30 minutes  35 minutes of total time spent on the encounter, which includes face to face time and non-face to face time preparing to see the patient (eg, review of tests), Obtaining and/or reviewing separately obtained history, Documenting clinical information in the electronic or other health record, Independently interpreting results (not separately reported) and communicating results to the patient/family/caregiver, or Care coordination (not separately reported).     Each patient to whom he or she provides medical services by telemedicine is:  (1) informed of the relationship between the physician and patient and the respective role of any other health care provider with respect to management of the patient; and (2) notified that he or she may decline to receive medical services by telemedicine and may withdraw from such care at any time.    Interval History and Content of Current Session:  Current Psychiatric Medications/changes  Continue Adderall 20 mg po TID  Continue Xanax 0.5 mg po daily PRN panic attacks    Virtual Visit: Pt presents bright affect and euthymic mood. Reports improvement in anxiety and ADHD symptoms with current medications. Reports that she is doing well. Denies SI/HI/AVH. No panic attacks. Will continue meds    Psychotherapy:  Target symptoms: distractability, lack of focus  Why chosen therapy is appropriate versus another modality: relevant to diagnosis  Outcome monitoring methods: self-report  Therapeutic intervention type:  insight oriented psychotherapy  Topics discussed/themes: building skills sets for symptom management, symptom recognition  The patient's response to the intervention is accepting. The patient's progress toward treatment goals is good.   Duration of intervention: 14 minutes.    Review of Systems   PSYCHIATRIC: Pertinant items are noted in the narrative.  CONSTITUTIONAL: No weight gain or loss.   MUSCULOSKELETAL: No pain or stiffness of the joints.  NEUROLOGIC: No weakness, sensory changes, seizures, confusion, memory loss, tremor or other abnormal movements.  ENDOCRINE: No polydipsia or polyuria.  INTEGUMENTARY: No rashes or lacerations.  EYES: No exophthalmos, jaundice or blindness.  ENT: No dizziness, tinnitus or hearing loss.  RESPIRATORY: No shortness of breath.  CARDIOVASCULAR: No tachycardia or chest pain.  GASTROINTESTINAL: No nausea, vomiting, pain, constipation or diarrhea.  GENITOURINARY: No frequency, dysuria or sexual dysfunction.  HEMATOLOGIC/LYMPHATIC: No excessive bleeding, prolonged or excessive bleeding after dental extraction/injury.  ALLERGIC/IMMUNOLOGIC: No allergic response to materials, foods or animals at this time.    Past Medical, Family and Social History: The patient's past medical, family and social history have been reviewed and updated as appropriate within the electronic medical record - see encounter notes.    Compliance: no    Side effects: None    Risk Parameters:  Patient reports no suicidal ideation  Patient reports no homicidal ideation  Patient reports no self-injurious behavior  Patient reports no violent behavior    Exam (detailed: at least 9 elements; comprehensive: all 15 elements)   Constitutional  Vitals:  Most recent vital signs, dated greater than 90 days prior to this appointment, were not reviewed.   There were no vitals filed for this visit.     General:  unremarkable, age appropriate     Musculoskeletal  Muscle Strength/Tone:  no tremor   Gait & Station:  non-ataxic      Psychiatric  Speech:  no latency; no press   Mood & Affect:  steady  congruent and appropriate   Thought Process:  normal and logical   Associations:  intact   Thought Content:  normal, no suicidality, no homicidality, delusions, or paranoia   Insight:  intact   Judgement: behavior is adequate to circumstances   Orientation:  grossly intact   Memory: intact for content of interview   Language: grossly intact   Attention Span & Concentration:  able to focus   Fund of Knowledge:  intact and appropriate to age and level of education     Assessment and Diagnosis   Status/Progress: Based on the examination today, the patient's problem(s) is/are improved.  New problems have not been presented today.   Co-morbidities and Lack of compliance are not complicating management of the primary condition.  There are no active rule-out diagnoses for this patient at this time.     General Impression:       ICD-10-CM ICD-9-CM   1. ADHD (attention deficit hyperactivity disorder), combined type  F90.2 314.01   2. Generalized anxiety disorder with panic attacks  F41.1 300.02    F41.0 300.01     Intervention/Counseling/Treatment Plan   Medication Management: The risks and benefits of medication were discussed with the patient.  Continue Adderall 30 mg po BID  Continue Xanax 0.5 mg po daily PRN panic attacks    Return to Clinic: 3 months    Risks, benefits, side effects and alternative treatments discussed with patient. Patient agrees with the current plan as documented.  Encouraged Patient to keep future appointments.  Take medications as prescribed and abstain from substance abuse.  Pt to present to ED for thoughts to harm herself or others

## 2025-08-07 RX ORDER — FLUTICASONE PROPIONATE 50 MCG
SPRAY, SUSPENSION (ML) NASAL
Qty: 48 G | Refills: 2 | Status: SHIPPED | OUTPATIENT
Start: 2025-08-07

## 2025-08-07 NOTE — TELEPHONE ENCOUNTER
Refill Decision Note   Terri Summers  is requesting a refill authorization.    Brief Assessment and Rationale for Refill:   Approve       Medication Therapy Plan:          Comments:     Note composed:4:03 PM 08/07/2025

## (undated) DEVICE — PROBE ARTHO ENERGY 90 DEG

## (undated) DEVICE — TOURNIQUET SB QC DP 34X4IN

## (undated) DEVICE — MANIFOLD 4 PORT

## (undated) DEVICE — TUBING SUC UNIV W/CONN 12FT

## (undated) DEVICE — TUBE SET INFLOW/OUTFLOW

## (undated) DEVICE — DRESSING XEROFORM 1X8IN

## (undated) DEVICE — DRESSING XEROFORM FOIL PK 1X8

## (undated) DEVICE — NDL SPINAL 18GX3.5 SPINOCAN

## (undated) DEVICE — TOURNIQUET SB QC DP 44X4IN

## (undated) DEVICE — REAMER 8.0MM GRAFTMAX FLEX

## (undated) DEVICE — DRESSING AQUACEL SACRAL 9 X 9

## (undated) DEVICE — GOWN POLY REINF BRTH SLV XL

## (undated) DEVICE — GAUZE SPONGE 4X4 12PLY

## (undated) DEVICE — CLOSURE SKIN STERI STRIP 1/2X4

## (undated) DEVICE — SUT TAPE FIBERLOOP 1.3MM

## (undated) DEVICE — GOWN POLY REINF BRTH SLV LG

## (undated) DEVICE — REAMER 4.5MM GRAFTMAX FLEX CH

## (undated) DEVICE — GLOVE BIOGEL ORTHOPEDIC 8

## (undated) DEVICE — DRAPE C-ARM/MOBILE XRAY 44X80

## (undated) DEVICE — SUT VICRYL PLUS 0 CT1 18IN

## (undated) DEVICE — DRAPE STERI INSTRUMENT 1018

## (undated) DEVICE — BNDG COFLEX FOAM LF2 ST 6X5YD

## (undated) DEVICE — SEE L#120831

## (undated) DEVICE — ADHESIVE DERMABOND ADVANCED

## (undated) DEVICE — ELECTRODE REM PLYHSV RETURN 9

## (undated) DEVICE — PADDING WYTEX UNDRCST 6INX4YD

## (undated) DEVICE — SUT ETHILON 3-0 BLK MONO FS

## (undated) DEVICE — BURR OVAL 4.0

## (undated) DEVICE — PUSHER NOVOCUT SUTURE MANAGER

## (undated) DEVICE — BRACE KNEE T SCOPE PREMIER

## (undated) DEVICE — Device

## (undated) DEVICE — SUT SUTURETAPE X 1.3MM 40IN

## (undated) DEVICE — COVER BACK TBL HD 2-TIER 72IN

## (undated) DEVICE — DUAL SPIKE ADAPTER

## (undated) DEVICE — LOOP PASSING HI-FI

## (undated) DEVICE — REAMER CONSTANT DIAM 8X178MM

## (undated) DEVICE — BLADE SURG #15 CARBON STEEL

## (undated) DEVICE — BANDAGE ESMARK ELASTIC ST 6X9

## (undated) DEVICE — TOWEL OR DISP STRL BLUE 4/PK

## (undated) DEVICE — SEE MEDLINE ITEM 157216

## (undated) DEVICE — SUT 1 36IN PDS II

## (undated) DEVICE — MAT SUCTION PUDDLEVAC ORANGE

## (undated) DEVICE — SPONGE GAUZE 16PLY 4X4

## (undated) DEVICE — GOWN POLY REINF X-LONG 2XL

## (undated) DEVICE — KIT ANATOMIC ACL DISPOSABLE

## (undated) DEVICE — BANDAGE ACE DOUBLE STER 6IN

## (undated) DEVICE — SUT CTD VICRYL 2.0

## (undated) DEVICE — GLOVE BIOGEL PI MICRO INDIC 8

## (undated) DEVICE — DRAPE U SPLIT SHEET 54X76IN

## (undated) DEVICE — COVER OVERHEAD SURG LT BLUE

## (undated) DEVICE — CONSTANT DIAMETER REAMER

## (undated) DEVICE — DRAPE ARTHSCP T ORTHOMAX POUCH

## (undated) DEVICE — SUT HSE FIBER 36IN MO6 NDL

## (undated) DEVICE — BLADE SURG CARBON STEEL SZ11

## (undated) DEVICE — SPONGE LAP 18X18 PREWASHED

## (undated) DEVICE — APPLICATOR CHLORAPREP ORN 26ML

## (undated) DEVICE — BANDAGE MATRIX HK LOOP 6IN 5YD

## (undated) DEVICE — DRAPE STERI U-SHAPED 47X51IN

## (undated) DEVICE — DRAPE THREE-QTR REINF 53X77IN

## (undated) DEVICE — PAD CAST SPECIALIST STRL 6

## (undated) DEVICE — SOL IRR NACL .9% 3000ML

## (undated) DEVICE — PACK SURGERY START

## (undated) DEVICE — COVER PROXIMA MAYO STAND

## (undated) DEVICE — PACK BASIC